# Patient Record
Sex: FEMALE | Race: WHITE | Employment: OTHER | URBAN - METROPOLITAN AREA
[De-identification: names, ages, dates, MRNs, and addresses within clinical notes are randomized per-mention and may not be internally consistent; named-entity substitution may affect disease eponyms.]

---

## 2017-01-03 RX ORDER — PREDNISONE 5 MG/1
TABLET ORAL
Qty: 70 TAB | Refills: 1 | Status: SHIPPED | OUTPATIENT
Start: 2017-01-03 | End: 2017-03-13 | Stop reason: SDUPTHER

## 2017-02-12 RX ORDER — GUANFACINE HYDROCHLORIDE 1 MG/1
TABLET ORAL
Qty: 45 TAB | Refills: 5 | Status: SHIPPED | OUTPATIENT
Start: 2017-02-12 | End: 2017-03-13 | Stop reason: SDUPTHER

## 2017-02-13 ENCOUNTER — HOSPITAL ENCOUNTER (OUTPATIENT)
Dept: LAB | Age: 82
Discharge: HOME | End: 2017-02-13
Payer: MEDICARE

## 2017-02-13 ENCOUNTER — OFFICE VISIT (OUTPATIENT)
Dept: INTERNAL MEDICINE CLINIC | Age: 82
End: 2017-02-13

## 2017-02-13 DIAGNOSIS — M15.9 PRIMARY OSTEOARTHRITIS INVOLVING MULTIPLE JOINTS: ICD-10-CM

## 2017-02-13 DIAGNOSIS — I10 ESSENTIAL HYPERTENSION: Primary | ICD-10-CM

## 2017-02-13 DIAGNOSIS — E78.00 PURE HYPERCHOLESTEROLEMIA: ICD-10-CM

## 2017-02-13 DIAGNOSIS — I10 ESSENTIAL HYPERTENSION: ICD-10-CM

## 2017-02-13 LAB
ANION GAP BLD CALC-SCNC: 10 MMOL/L (ref 3–18)
BUN SERPL-MCNC: 56 MG/DL (ref 7–18)
BUN/CREAT SERPL: 36 (ref 12–20)
CALCIUM SERPL-MCNC: 9 MG/DL (ref 8.5–10.1)
CHLORIDE SERPL-SCNC: 101 MMOL/L (ref 100–108)
CO2 SERPL-SCNC: 26 MMOL/L (ref 21–32)
CREAT SERPL-MCNC: 1.57 MG/DL (ref 0.6–1.3)
ERYTHROCYTE [SEDIMENTATION RATE] IN BLOOD: 33 MM/HR (ref 0–30)
GLUCOSE SERPL-MCNC: 108 MG/DL (ref 74–99)
POTASSIUM SERPL-SCNC: 4.5 MMOL/L (ref 3.5–5.5)
SODIUM SERPL-SCNC: 137 MMOL/L (ref 136–145)

## 2017-02-13 PROCEDURE — 86430 RHEUMATOID FACTOR TEST QUAL: CPT | Performed by: INTERNAL MEDICINE

## 2017-02-13 PROCEDURE — 80048 BASIC METABOLIC PNL TOTAL CA: CPT | Performed by: INTERNAL MEDICINE

## 2017-02-13 PROCEDURE — 36415 COLL VENOUS BLD VENIPUNCTURE: CPT | Performed by: INTERNAL MEDICINE

## 2017-02-13 PROCEDURE — 85652 RBC SED RATE AUTOMATED: CPT | Performed by: INTERNAL MEDICINE

## 2017-02-13 NOTE — PROGRESS NOTES
1. Have you been to the ER, urgent care clinic since your last visit? Hospitalized since your last visit? No    2. Have you seen or consulted any other health care providers outside of the [address] [place] [address] since your last visit? Include any pap smears or colon screening.  No

## 2017-02-13 NOTE — PATIENT INSTRUCTIONS
Health Maintenance Due   Topic Date Due    Glaucoma Screening   08/23/1997    Annual Well Visit  08/23/1997

## 2017-02-14 VITALS
OXYGEN SATURATION: 98 % | BODY MASS INDEX: 27.19 KG/M2 | DIASTOLIC BLOOD PRESSURE: 80 MMHG | HEART RATE: 56 BPM | HEIGHT: 61 IN | SYSTOLIC BLOOD PRESSURE: 138 MMHG | WEIGHT: 144 LBS

## 2017-02-14 LAB — RHEUMATOID FACT SER QL LA: NEGATIVE

## 2017-02-15 NOTE — PROGRESS NOTES
The patient presents to the office today with the chief complaint of arthralgias    HPI    The patient remains on Prednisone at 10 mg daily. She has pain and stiffness in multiple joints - this is much better on Prednisone. She feels that she is doing OK. The patient remains on medications for hypertension and hyperlipidemia. She is tolerating those medications well      Review of Systems   Respiratory: Negative for shortness of breath. Cardiovascular: Negative for chest pain and leg swelling. Genitourinary: Urgency: the pain is doing much better. Musculoskeletal: Positive for joint pain. Allergies   Allergen Reactions    Codeine Hives    Demerol [Meperidine] Nausea Only    Levaquin [Levofloxacin] Other (comments)     Swelling in feet and legs       Current Outpatient Prescriptions   Medication Sig Dispense Refill    peg 400-propylene glycol (SYSTANE, PROPYLENE GLYCOL,) 0.4-0.3 % drop Administer  to left eye as needed.  guanFACINE (TENEX) 1 mg tablet TAKE 1/2 TABLET BY MOUTH EVERY DAY. 45 Tab 5    predniSONE (DELTASONE) 5 mg tablet Take 3 tablets by mouth daily for 1 week. Then 2.5 tablets by mouth daily for 1 week. Then 2 tablets by mouth until further notice. 70 Tab 1    allopurinol (ZYLOPRIM) 100 mg tablet TAKE 1/2 TABLET BY MOUTH DAILY 30 Tab 0    GLUCOSAMINE HCL/CHONDR LEGER A NA (OSTEO BI-FLEX PO) Take  by mouth.  doxepin (SINEQUAN) 25 mg capsule Take 1 Cap by mouth nightly. 90 Cap 3    simvastatin (ZOCOR) 20 mg tablet Take 1 Tab by mouth nightly. 90 Tab 3    ranitidine (ZANTAC) 300 mg tablet Take 1 Tab by mouth daily. 90 Tab 3    levothyroxine (SYNTHROID) 100 mcg tablet Take 1 Tab by mouth nightly. 90 Tab 3    furosemide (LASIX) 20 mg tablet 1 tablet each AM 90 Tab 3    losartan (COZAAR) 100 mg tablet Take 1 Tab by mouth daily. 90 Tab 3    metoprolol tartrate (LOPRESSOR) 50 mg tablet Take 1 Tab by mouth two (2) times a day.  180 Tab 3    cyanocobalamin 1,000 mcg tablet Take 1,000 mcg by mouth daily.  colchicine 0.6 mg tablet Take 1 Tab by mouth daily. Indications: GOUT PREVENTION 30 Tab 2    aspirin delayed-release 81 mg tablet Take  by mouth daily.  folic acid 568 mcg tablet Take 800 mcg by mouth daily.  CALCIUM PHOSPHATE TRIB/VIT D3 (CITRACAL + D PO) Take  by mouth daily.  multivitamins-minerals-lutein (CENTRUM SILVER) tab tablet Take 1 Tab by mouth daily.  GLUC/LAVERNE-MSM#1/VIT C/COLLIN/BOR (GLUCOSAMINE-CHOND-MSM COMPLEX PO) Take 2 Tabs by mouth daily.  loratadine (CLARITIN) 10 mg tablet Take 10 mg by mouth daily.  tamoxifen (NOLVADEX) 20 mg tablet Take 20 mg by mouth daily.  clobetasol (OLUX) 0.05 % topical foam Apply  to affected area two (2) times daily as needed for Skin Irritation.  use thin film on affected area         Past Medical History   Diagnosis Date    Autoimmune disease ([address]     Breast cancer ([address] 2001, 1/ 2014     right, left    Cancer ([address]     Colon polyps     Elevated cholesterol     Enlarged lymph nodes      pericardium    GERD (gastroesophageal reflux disease)     Hypertension     Ill-defined condition 2007 to 2010     lung-ground glass followed by Dr. Felipe Rodriguez of breast, right 1976     benign    Mass of breast, right 1994     benign    Pure hyperglyceridemia     Thyroid disease        Past Surgical History   Procedure Laterality Date    Hx mastectomy Right 2001     with reconstruction    Hx mastectomy Left 1/14    Hx hemorrhoidectomy      Hx polypectomy       [id number] muliple removals    Hx cholecystectomy      Hx hysterectomy  1991     cystocele    Hx lymphadenectomy Right 7/2001     right axilla    Hx rectocele repair  2003    Hx hernia repair  2004     small abdominal repair and fistula    Hx cataract removal Bilateral 2010    Hx appendectomy         Social History     Social History    Marital status:      Spouse name: N/A    Number of children: N/A    Years of education: N/A     Occupational History    Not on file. Social History Main Topics    Smoking status: Former Smoker     Packs/day: 2.00     Years: 9.00     Quit date: 1/1/1963    Smokeless tobacco: Never Used    Alcohol use 0.5 oz/week     1 Standard drinks or equivalent per week      Comment: occasionally    Drug use: No    Sexual activity: Not Currently     Other Topics Concern    Not on file     Social History Narrative       Patient does not have an advanced directive on file    Visit Vitals    /80 (BP 1 Location: Left leg, BP Patient Position: Sitting)    Pulse (!) 56    Ht 5' 1\" (1.549 m)    Wt 144 lb (65.3 kg)    SpO2 98%    BMI 27.21 kg/m2       Physical Exam   Neck: Carotid bruit is not present. Cardiovascular: Normal rate and regular rhythm. Exam reveals no gallop. No murmur heard. Pulmonary/Chest: She has no wheezes. She has no rales. Abdominal: Soft. She exhibits no distension. There is no tenderness. Musculoskeletal: She exhibits no edema.    Osteoarthritis in both hands and knees       BMI:  [facility] [place] [facility] [place] on 02/13/2017   Component Date Value Ref Range Status    Sed rate, automated 02/13/2017 33* 0 - 30 mm/hr Final    Sodium 02/13/2017 137  136 - 145 mmol/L Final    Potassium 02/13/2017 4.5  3.5 - 5.5 mmol/L Final    Chloride 02/13/2017 101  100 - 108 mmol/L Final    CO2 02/13/2017 26  21 - 32 mmol/L Final    Anion gap 02/13/2017 10  3.0 - 18 mmol/L Final    Glucose 02/13/2017 108* 74 - 99 mg/dL Final    BUN 02/13/2017 56* 7.0 - 18 MG/DL Final    Creatinine 02/13/2017 1.57* 0.6 - 1.3 MG/DL Final    BUN/Creatinine ratio 02/13/2017 36* 12 - 20   Final    GFR est AA 02/13/2017 38* >60 ml/min/1.73m2 Final    GFR est non-AA 02/13/2017 31* >60 ml/min/1.73m2 Final    Comment: (NOTE)  Estimated GFR is calculated using the Modification of Diet in Renal   Disease (MDRD) Study equation, reported for both  Americans [facility]) and non- Americans (GFRNA), and normalized to 1.73m2   body surface area. The physician must decide which value applies to   the patient. The MDRD study equation should only be used in   individuals age 25 or older. It has not been validated for the   following: pregnant women, patients with serious comorbid conditions,   or on certain medications, or persons with extremes of body size,   muscle mass, or nutritional status.  Calcium 02/13/2017 9.0  8.5 - 10.1 MG/DL Final   Hospital Outpatient Visit on 12/16/2016   Component Date Value Ref Range Status    WBC 12/16/2016 7.7  4.6 - 13.2 K/uL Final    RBC 12/16/2016 3.51* 4.20 - 5.30 M/uL Final    HGB 12/16/2016 11.0* 12.0 - 16.0 g/dL Final    HCT 12/16/2016 33.7* 35.0 - 45.0 % Final    MCV 12/16/2016 96.0  74.0 - 97.0 FL Final    MCH 12/16/2016 31.3  24.0 - 34.0 PG Final    MCHC 12/16/2016 32.6  31.0 - 37.0 g/dL Final    RDW 12/16/2016 13.8  11.6 - 14.5 % Final    PLATELET 93/78/5569 245  135 - 420 K/uL Final    MPV 12/16/2016 9.2  9.2 - 11.8 FL Final    NEUTROPHILS 12/16/2016 63  40 - 73 % Final    LYMPHOCYTES 12/16/2016 21  21 - 52 % Final    MONOCYTES 12/16/2016 9  3 - 10 % Final    EOSINOPHILS 12/16/2016 6* 0 - 5 % Final    BASOPHILS 12/16/2016 1  0 - 2 % Final    ABS. NEUTROPHILS 12/16/2016 4.9  1.8 - 8.0 K/UL Final    ABS. LYMPHOCYTES 12/16/2016 1.6  0.9 - 3.6 K/UL Final    ABS. MONOCYTES 12/16/2016 0.7  0.05 - 1.2 K/UL Final    ABS. EOSINOPHILS 12/16/2016 0.4  0.0 - 0.4 K/UL Final    ABS.  BASOPHILS 12/16/2016 0.1* 0.0 - 0.06 K/UL Final    DF 12/16/2016 AUTOMATED    Final    Sodium 12/16/2016 135* 136 - 145 mmol/L Final    Potassium 12/16/2016 4.1  3.5 - 5.5 mmol/L Final    Chloride 12/16/2016 100  100 - 108 mmol/L Final    CO2 12/16/2016 27  21 - 32 mmol/L Final    Anion gap 12/16/2016 8  3.0 - 18 mmol/L Final    Glucose 12/16/2016 104* 74 - 99 mg/dL Final    BUN 12/16/2016 34* 7.0 - 18 MG/DL Final    Creatinine 12/16/2016 1.34* 0.6 - 1.3 MG/DL Final    BUN/Creatinine ratio 12/16/2016 25* 12 - 20   Final    GFR est AA 12/16/2016 46* >60 ml/min/1.73m2 Final    GFR est non-AA 12/16/2016 38* >60 ml/min/1.73m2 Final    Comment: (NOTE)  Estimated GFR is calculated using the Modification of Diet in Renal   Disease (MDRD) Study equation, reported for both  Americans   (GFRAA) and non- Americans (GFRNA), and normalized to 1.73m2   body surface area. The physician must decide which value applies to   the patient. The MDRD study equation should only be used in   individuals age 25 or older. It has not been validated for the   following: pregnant women, patients with serious comorbid conditions,   or on certain medications, or persons with extremes of body size,   muscle mass, or nutritional status.  Calcium 12/16/2016 9.2  8.5 - 10.1 MG/DL Final    Bilirubin, total 12/16/2016 0.4  0.2 - 1.0 MG/DL Final    ALT (SGPT) 12/16/2016 29  13 - 56 U/L Final    AST (SGOT) 12/16/2016 28  15 - 37 U/L Final    Alk.  phosphatase 12/16/2016 52  45 - 117 U/L Final    Protein, total 12/16/2016 7.2  6.4 - 8.2 g/dL Final    Albumin 12/16/2016 3.7  3.4 - 5.0 g/dL Final    Globulin 12/16/2016 3.5  2.0 - 4.0 g/dL Final    A-G Ratio 12/16/2016 1.1  0.8 - 1.7   Final    C-Reactive protein 12/16/2016 0.3  0 - 0.3 mg/dL Final    Sed rate, automated 12/16/2016 66* 0 - 30 mm/hr Final       .  Results for orders placed or performed during the hospital encounter of 02/13/17   SED RATE (ESR)   Result Value Ref Range    Sed rate, automated 33 (H) 0 - 30 mm/hr   METABOLIC PANEL, BASIC   Result Value Ref Range    Sodium 137 136 - 145 mmol/L    Potassium 4.5 3.5 - 5.5 mmol/L    Chloride 101 100 - 108 mmol/L    CO2 26 21 - 32 mmol/L    Anion gap 10 3.0 - 18 mmol/L    Glucose 108 (H) 74 - 99 mg/dL    BUN 56 (H) 7.0 - 18 MG/DL    Creatinine 1.57 (H) 0.6 - 1.3 MG/DL    BUN/Creatinine ratio 36 (H) 12 - 20      GFR est AA 38 (L) >60 ml/min/1.73m2    GFR est non-AA 31 (L) >60 ml/min/1.73m2    Calcium 9.0 8.5 - 10.1 MG/DL       Assessment / Plan      ICD-10-CM ICD-9-CM    1. Essential hypertension I10 401.9 peg 400-propylene glycol (SYSTANE, PROPYLENE GLYCOL,) 0.4-0.3 % drop      SED RATE (ESR)      METABOLIC PANEL, BASIC   2. Primary osteoarthritis involving multiple joints M15.0 715.09 peg 400-propylene glycol (SYSTANE, PROPYLENE GLYCOL,) 0.4-0.3 % drop      SED RATE (ESR)      METABOLIC PANEL, BASIC      RHEUMATOID FACTOR, QL   3. Pure hypercholesterolemia E78.00 272.0      Change Prednisone to 10 mg alternate with 5 mg  she was advised to continue her maintenance medications  Labs ordered    Follow-up Disposition:  Return in about 4 months (around 6/13/2017). I asked Rachael Diggs if she has any questions and I answered the questions. Chadwick Diggs states that she understands the treatment plan and agrees with the treatment plan

## 2017-02-17 ENCOUNTER — TELEPHONE (OUTPATIENT)
Dept: INTERNAL MEDICINE CLINIC | Age: 82
End: 2017-02-17

## 2017-03-13 ENCOUNTER — TELEPHONE (OUTPATIENT)
Dept: INTERNAL MEDICINE CLINIC | Age: 82
End: 2017-03-13

## 2017-03-13 RX ORDER — GUANFACINE HYDROCHLORIDE 1 MG/1
TABLET ORAL
Qty: 45 TAB | Refills: 5 | Status: SHIPPED | OUTPATIENT
Start: 2017-03-13 | End: 2017-04-11 | Stop reason: SDUPTHER

## 2017-03-13 RX ORDER — PREDNISONE 5 MG/1
TABLET ORAL
Qty: 70 TAB | Refills: 1 | Status: SHIPPED | OUTPATIENT
Start: 2017-03-13 | End: 2017-05-30 | Stop reason: SDUPTHER

## 2017-03-13 NOTE — TELEPHONE ENCOUNTER
Requested Prescriptions     Pending Prescriptions Disp Refills    predniSONE (DELTASONE) 5 mg tablet 70 Tab 1     Sig: Take 3 tablets by mouth daily for 1 week. Then 2.5 tablets by mouth daily for 1 week. Then 2 tablets by mouth until further notice.     guanFACINE (TENEX) 1 mg tablet 45 Tab 5

## 2017-03-13 NOTE — TELEPHONE ENCOUNTER
Patient has called for lab results in February and never received a call back.   Please call her with her lab results at [phone]

## 2017-03-15 ENCOUNTER — OFFICE VISIT (OUTPATIENT)
Dept: INTERNAL MEDICINE CLINIC | Age: 82
End: 2017-03-15

## 2017-03-15 VITALS
BODY MASS INDEX: 27.19 KG/M2 | HEART RATE: 57 BPM | RESPIRATION RATE: 16 BRPM | HEIGHT: 61 IN | TEMPERATURE: 96.5 F | OXYGEN SATURATION: 97 % | SYSTOLIC BLOOD PRESSURE: 140 MMHG | WEIGHT: 144 LBS | DIASTOLIC BLOOD PRESSURE: 72 MMHG

## 2017-03-15 DIAGNOSIS — E78.00 PURE HYPERCHOLESTEROLEMIA: ICD-10-CM

## 2017-03-15 DIAGNOSIS — N18.2 CHRONIC RENAL INSUFFICIENCY, STAGE 2 (MILD): ICD-10-CM

## 2017-03-15 DIAGNOSIS — R30.0 DYSURIA: Primary | ICD-10-CM

## 2017-03-15 DIAGNOSIS — I10 ESSENTIAL HYPERTENSION: ICD-10-CM

## 2017-03-15 LAB
BILIRUB UR QL STRIP: NEGATIVE
GLUCOSE UR-MCNC: NEGATIVE MG/DL
KETONES P FAST UR STRIP-MCNC: NEGATIVE MG/DL
PH UR STRIP: 5 [PH] (ref 4.6–8)
PROT UR QL STRIP: NEGATIVE MG/DL
SP GR UR STRIP: 1.01 (ref 1–1.03)
UA UROBILINOGEN AMB POC: NORMAL (ref 0.2–1)
URINALYSIS CLARITY POC: CLEAR
URINALYSIS COLOR POC: YELLOW
URINE BLOOD POC: NEGATIVE
URINE LEUKOCYTES POC: NEGATIVE
URINE NITRITES POC: NEGATIVE

## 2017-03-15 RX ORDER — VERAPAMIL HYDROCHLORIDE 180 MG/1
TABLET, EXTENDED RELEASE ORAL
Qty: 30 TAB | Refills: 2 | Status: SHIPPED | OUTPATIENT
Start: 2017-03-15 | End: 2017-03-15 | Stop reason: SDUPTHER

## 2017-03-15 NOTE — PROGRESS NOTES
1. Have you been to the ER, urgent care clinic since your last visit? Hospitalized since your last visit? No    2. Have you seen or consulted any other health care providers outside of the [address] [place] [address] since your last visit? Include any pap smears or colon screening.  No

## 2017-03-16 RX ORDER — VERAPAMIL HYDROCHLORIDE 180 MG/1
TABLET, EXTENDED RELEASE ORAL
Qty: 90 TAB | Refills: 2 | Status: SHIPPED | OUTPATIENT
Start: 2017-03-16 | End: 2017-06-12

## 2017-03-17 NOTE — PROGRESS NOTES
The patient presents to the office today with the chief complaint of hypertension    HPI    The patient remains on medications for hypertension. she is tolerating the medications well. The patient has renal insufficiency which is stage 2 but slowly worsening   The patient remains on medications for hyperlipidemia. The patient is tolerating the medications well. The patient has complaints of mild dysuria. Review of Systems   Respiratory: Negative for shortness of breath. Cardiovascular: Negative for chest pain and leg swelling. Genitourinary: Positive for dysuria (As per HPI). Allergies   Allergen Reactions    Codeine Hives    Demerol [Meperidine] Nausea Only    Levaquin [Levofloxacin] Other (comments)     Swelling in feet and legs       Current Outpatient Prescriptions   Medication Sig Dispense Refill    verapamil ER (CALAN-SR) 180 mg CR tablet TAKE 1 TABLET BY MOUTH DAILY. STOP LOSARTAN 90 Tab 2    predniSONE (DELTASONE) 5 mg tablet Take 3 tablets by mouth daily for 1 week. Then 2.5 tablets by mouth daily for 1 week. Then 2 tablets by mouth until further notice. 70 Tab 1    guanFACINE (TENEX) 1 mg tablet TAKE 1/2 TABLET BY MOUTH EVERY DAY. 45 Tab 5    peg 400-propylene glycol (SYSTANE, PROPYLENE GLYCOL,) 0.4-0.3 % drop Administer  to left eye as needed.  allopurinol (ZYLOPRIM) 100 mg tablet TAKE 1/2 TABLET BY MOUTH DAILY 30 Tab 0    GLUCOSAMINE HCL/CHONDR LEGER A NA (OSTEO BI-FLEX PO) Take  by mouth.  doxepin (SINEQUAN) 25 mg capsule Take 1 Cap by mouth nightly. 90 Cap 3    simvastatin (ZOCOR) 20 mg tablet Take 1 Tab by mouth nightly. 90 Tab 3    ranitidine (ZANTAC) 300 mg tablet Take 1 Tab by mouth daily. 90 Tab 3    levothyroxine (SYNTHROID) 100 mcg tablet Take 1 Tab by mouth nightly. 90 Tab 3    furosemide (LASIX) 20 mg tablet 1 tablet each AM 90 Tab 3    losartan (COZAAR) 100 mg tablet Take 1 Tab by mouth daily.  90 Tab 3    metoprolol tartrate (LOPRESSOR) 50 mg tablet Take 1 Tab by mouth two (2) times a day. 180 Tab 3    cyanocobalamin 1,000 mcg tablet Take 1,000 mcg by mouth daily.  colchicine 0.6 mg tablet Take 1 Tab by mouth daily. Indications: GOUT PREVENTION 30 Tab 2    aspirin delayed-release 81 mg tablet Take  by mouth daily.  folic acid 564 mcg tablet Take 800 mcg by mouth daily.  CALCIUM PHOSPHATE TRIB/VIT D3 (CITRACAL + D PO) Take  by mouth daily.  multivitamins-minerals-lutein (CENTRUM SILVER) tab tablet Take 1 Tab by mouth daily.  GLUC/LAVERNE-MSM#1/VIT C/COLLIN/BOR (GLUCOSAMINE-CHOND-MSM COMPLEX PO) Take 2 Tabs by mouth daily.  loratadine (CLARITIN) 10 mg tablet Take 10 mg by mouth daily.  tamoxifen (NOLVADEX) 20 mg tablet Take 20 mg by mouth daily.  clobetasol (OLUX) 0.05 % topical foam Apply  to affected area two (2) times daily as needed for Skin Irritation.  use thin film on affected area         Past Medical History:   Diagnosis Date    Autoimmune disease ([address]     Breast cancer ([address] 2001, 1/ 2014    right, left    Cancer ([address]     Colon polyps     Elevated cholesterol     Enlarged lymph nodes     pericardium    GERD (gastroesophageal reflux disease)     Hypertension     Ill-defined condition 2007 to 2010    lung-ground glass followed by Dr. Jonathan Franco of breast, right 1976    benign    Mass of breast, right 1994    benign    Pure hyperglyceridemia     Thyroid disease        Past Surgical History:   Procedure Laterality Date    HX APPENDECTOMY      HX CATARACT REMOVAL Bilateral 2010    HX CHOLECYSTECTOMY      HX HEMORRHOIDECTOMY      HX HERNIA REPAIR  2004    small abdominal repair and fistula    HX HYSTERECTOMY  1991    cystocele    HX LYMPHADENECTOMY Right 7/2001    right axilla    HX MASTECTOMY Right 2001    with reconstruction    HX MASTECTOMY Left 1/14    HX POLYPECTOMY      [id number] muliple removals    HX RECTOCELE REPAIR  2003       Social History Social History    Marital status:      Spouse name: N/A    Number of children: N/A    Years of education: N/A     Occupational History    Not on file.      Social History Main Topics    Smoking status: Former Smoker     Packs/day: 2.00     Years: 9.00     Quit date: 1/1/1963    Smokeless tobacco: Never Used    Alcohol use 0.5 oz/week     1 Standard drinks or equivalent per week      Comment: occasionally    Drug use: No    Sexual activity: Not Currently     Other Topics Concern    Not on file     Social History Narrative       Patient does have an advanced directive on file    Visit Vitals    /72 (BP 1 Location: Left arm, BP Patient Position: Sitting)    Pulse (!) 57    Temp 96.5 °F (35.8 °C) (Tympanic)    Resp 16    Ht 5' 1\" (1.549 m)    Wt 144 lb (65.3 kg)    SpO2 97%    BMI 27.21 kg/m2       Physical Exam    BMI:  ok    Office Visit on 03/15/2017   Component Date Value Ref Range Status    Color (UA POC) 03/15/2017 Yellow   Final    Clarity (UA POC) 03/15/2017 Clear   Final    Glucose (UA POC) 03/15/2017 Negative  Negative Final    Bilirubin (UA POC) 03/15/2017 Negative  Negative Final    Ketones (UA POC) 03/15/2017 Negative  Negative Final    Specific gravity (UA POC) 03/15/2017 1.010  1.001 - 1.035 Final    Blood (UA POC) 03/15/2017 Negative  Negative Final    pH (UA POC) 03/15/2017 5.0  4.6 - 8.0 Final    Protein (UA POC) 03/15/2017 Negative  Negative mg/dL Final    Urobilinogen (UA POC) 03/15/2017 0.2 mg/dL  0.2 - 1 Final    Nitrites (UA POC) 03/15/2017 Negative  Negative Final    Leukocyte esterase (UA POC) 03/15/2017 Negative  Negative Final   Hospital Outpatient Visit on 02/13/2017   Component Date Value Ref Range Status    Sed rate, automated 02/13/2017 33* 0 - 30 mm/hr Final    Sodium 02/13/2017 137  136 - 145 mmol/L Final    Potassium 02/13/2017 4.5  3.5 - 5.5 mmol/L Final    Chloride 02/13/2017 101  100 - 108 mmol/L Final    CO2 02/13/2017 26  21 - 32 mmol/L Final    Anion gap 02/13/2017 10  3.0 - 18 mmol/L Final    Glucose 02/13/2017 108* 74 - 99 mg/dL Final    BUN 02/13/2017 56* 7.0 - 18 MG/DL Final    Creatinine 02/13/2017 1.57* 0.6 - 1.3 MG/DL Final    BUN/Creatinine ratio 02/13/2017 36* 12 - 20   Final    GFR est AA 02/13/2017 38* >60 ml/min/1.73m2 Final    GFR est non-AA 02/13/2017 31* >60 ml/min/1.73m2 Final    Comment: (NOTE)  Estimated GFR is calculated using the Modification of Diet in Renal   Disease (MDRD) Study equation, reported for both  Americans   (GFRAA) and non- Americans (GFRNA), and normalized to 1.73m2   body surface area. The physician must decide which value applies to   the patient. The MDRD study equation should only be used in   individuals age 25 or older. It has not been validated for the   following: pregnant women, patients with serious comorbid conditions,   or on certain medications, or persons with extremes of body size,   muscle mass, or nutritional status.  Calcium 02/13/2017 9.0  8.5 - 10.1 MG/DL Final    RA Latex, Ql. 02/13/2017 NEGATIVE   NEG   Final   Hospital Outpatient Visit on 12/16/2016   Component Date Value Ref Range Status    WBC 12/16/2016 7.7  4.6 - 13.2 K/uL Final    RBC 12/16/2016 3.51* 4.20 - 5.30 M/uL Final    HGB 12/16/2016 11.0* 12.0 - 16.0 g/dL Final    HCT 12/16/2016 33.7* 35.0 - 45.0 % Final    MCV 12/16/2016 96.0  74.0 - 97.0 FL Final    MCH 12/16/2016 31.3  24.0 - 34.0 PG Final    MCHC 12/16/2016 32.6  31.0 - 37.0 g/dL Final    RDW 12/16/2016 13.8  11.6 - 14.5 % Final    PLATELET 62/84/4959 869  135 - 420 K/uL Final    MPV 12/16/2016 9.2  9.2 - 11.8 FL Final    NEUTROPHILS 12/16/2016 63  40 - 73 % Final    LYMPHOCYTES 12/16/2016 21  21 - 52 % Final    MONOCYTES 12/16/2016 9  3 - 10 % Final    EOSINOPHILS 12/16/2016 6* 0 - 5 % Final    BASOPHILS 12/16/2016 1  0 - 2 % Final    ABS. NEUTROPHILS 12/16/2016 4.9  1.8 - 8.0 K/UL Final    ABS.  LYMPHOCYTES 12/16/2016 1.6  0.9 - 3.6 K/UL Final    ABS. MONOCYTES 12/16/2016 0.7  0.05 - 1.2 K/UL Final    ABS. EOSINOPHILS 12/16/2016 0.4  0.0 - 0.4 K/UL Final    ABS. BASOPHILS 12/16/2016 0.1* 0.0 - 0.06 K/UL Final    DF 12/16/2016 AUTOMATED    Final    Sodium 12/16/2016 135* 136 - 145 mmol/L Final    Potassium 12/16/2016 4.1  3.5 - 5.5 mmol/L Final    Chloride 12/16/2016 100  100 - 108 mmol/L Final    CO2 12/16/2016 27  21 - 32 mmol/L Final    Anion gap 12/16/2016 8  3.0 - 18 mmol/L Final    Glucose 12/16/2016 104* 74 - 99 mg/dL Final    BUN 12/16/2016 34* 7.0 - 18 MG/DL Final    Creatinine 12/16/2016 1.34* 0.6 - 1.3 MG/DL Final    BUN/Creatinine ratio 12/16/2016 25* 12 - 20   Final    GFR est AA 12/16/2016 46* >60 ml/min/1.73m2 Final    GFR est non-AA 12/16/2016 38* >60 ml/min/1.73m2 Final    Comment: (NOTE)  Estimated GFR is calculated using the Modification of Diet in Renal   Disease (MDRD) Study equation, reported for both  Americans   (GFRAA) and non- Americans (GFRNA), and normalized to 1.73m2   body surface area. The physician must decide which value applies to   the patient. The MDRD study equation should only be used in   individuals age 25 or older. It has not been validated for the   following: pregnant women, patients with serious comorbid conditions,   or on certain medications, or persons with extremes of body size,   muscle mass, or nutritional status.  Calcium 12/16/2016 9.2  8.5 - 10.1 MG/DL Final    Bilirubin, total 12/16/2016 0.4  0.2 - 1.0 MG/DL Final    ALT (SGPT) 12/16/2016 29  13 - 56 U/L Final    AST (SGOT) 12/16/2016 28  15 - 37 U/L Final    Alk.  phosphatase 12/16/2016 52  45 - 117 U/L Final    Protein, total 12/16/2016 7.2  6.4 - 8.2 g/dL Final    Albumin 12/16/2016 3.7  3.4 - 5.0 g/dL Final    Globulin 12/16/2016 3.5  2.0 - 4.0 g/dL Final    A-G Ratio 12/16/2016 1.1  0.8 - 1.7   Final    C-Reactive protein 12/16/2016 0.3  0 - 0.3 mg/dL Final    Sed rate, automated 12/16/2016 66* 0 - 30 mm/hr Final       .  Results for orders placed or performed in visit on 03/15/17   AMB POC URINALYSIS DIP STICK AUTO W/O MICRO   Result Value Ref Range    Color (UA POC) Yellow     Clarity (UA POC) Clear     Glucose (UA POC) Negative Negative    Bilirubin (UA POC) Negative Negative    Ketones (UA POC) Negative Negative    Specific gravity (UA POC) 1.010 1.001 - 1.035    Blood (UA POC) Negative Negative    pH (UA POC) 5.0 4.6 - 8.0    Protein (UA POC) Negative Negative mg/dL    Urobilinogen (UA POC) 0.2 mg/dL 0.2 - 1    Nitrites (UA POC) Negative Negative    Leukocyte esterase (UA POC) Negative Negative       Assessment / Plan      ICD-10-CM ICD-9-CM    1. Dysuria R30.0 788.1 AMB POC URINALYSIS DIP STICK AUTO W/O MICRO   2. Essential hypertension I10 401.9    3. Pure hypercholesterolemia E78.00 272.0    4. Chronic renal insufficiency, stage 2 (mild) N18.2 585.2      POC urine  Change Losartan to Verapamil  she was advised to continue her other maintenance medications  Recheck labs in 6 weeks    Follow-up Disposition:  Return in about 4 months (around 7/15/2017). I asked Adore Espinoza. Kate Sunshine if she has any questions and I answered the questions. Rhonda Sunshine states that she understands the treatment plan and agrees with the treatment plan

## 2017-04-12 ENCOUNTER — LAB ONLY (OUTPATIENT)
Dept: INTERNAL MEDICINE CLINIC | Age: 82
End: 2017-04-12

## 2017-04-12 ENCOUNTER — HOSPITAL ENCOUNTER (OUTPATIENT)
Dept: LAB | Age: 82
Discharge: HOME | End: 2017-04-12
Payer: MEDICARE

## 2017-04-12 DIAGNOSIS — I10 ESSENTIAL HYPERTENSION: ICD-10-CM

## 2017-04-12 DIAGNOSIS — E78.00 PURE HYPERCHOLESTEROLEMIA: ICD-10-CM

## 2017-04-12 DIAGNOSIS — I10 ESSENTIAL HYPERTENSION: Primary | ICD-10-CM

## 2017-04-12 LAB
ALBUMIN SERPL BCP-MCNC: 3.6 G/DL (ref 3.4–5)
ALBUMIN/GLOB SERPL: 1.1 {RATIO} (ref 0.8–1.7)
ALP SERPL-CCNC: 46 U/L (ref 45–117)
ALT SERPL-CCNC: 34 U/L (ref 13–56)
ANION GAP BLD CALC-SCNC: 10 MMOL/L (ref 3–18)
AST SERPL W P-5'-P-CCNC: 21 U/L (ref 15–37)
BASOPHILS # BLD AUTO: 0.1 K/UL (ref 0–0.06)
BASOPHILS # BLD: 1 % (ref 0–2)
BILIRUB SERPL-MCNC: 0.4 MG/DL (ref 0.2–1)
BUN SERPL-MCNC: 39 MG/DL (ref 7–18)
BUN/CREAT SERPL: 25 (ref 12–20)
CALCIUM SERPL-MCNC: 9.4 MG/DL (ref 8.5–10.1)
CHLORIDE SERPL-SCNC: 101 MMOL/L (ref 100–108)
CHOLEST SERPL-MCNC: 159 MG/DL
CO2 SERPL-SCNC: 29 MMOL/L (ref 21–32)
CREAT SERPL-MCNC: 1.54 MG/DL (ref 0.6–1.3)
DIFFERENTIAL METHOD BLD: ABNORMAL
EOSINOPHIL # BLD: 0.2 K/UL (ref 0–0.4)
EOSINOPHIL NFR BLD: 3 % (ref 0–5)
ERYTHROCYTE [DISTWIDTH] IN BLOOD BY AUTOMATED COUNT: 14.7 % (ref 11.6–14.5)
ERYTHROCYTE [SEDIMENTATION RATE] IN BLOOD: 33 MM/HR (ref 0–30)
GLOBULIN SER CALC-MCNC: 3.2 G/DL (ref 2–4)
GLUCOSE SERPL-MCNC: 83 MG/DL (ref 74–99)
HCT VFR BLD AUTO: 36.9 % (ref 35–45)
HDLC SERPL-MCNC: 83 MG/DL (ref 40–60)
HDLC SERPL: 1.9 {RATIO} (ref 0–5)
HGB BLD-MCNC: 11.3 G/DL (ref 12–16)
LDLC SERPL CALC-MCNC: 52.2 MG/DL (ref 0–100)
LIPID PROFILE,FLP: ABNORMAL
LYMPHOCYTES # BLD AUTO: 27 % (ref 21–52)
LYMPHOCYTES # BLD: 2.1 K/UL (ref 0.9–3.6)
MCH RBC QN AUTO: 31.3 PG (ref 24–34)
MCHC RBC AUTO-ENTMCNC: 30.6 G/DL (ref 31–37)
MCV RBC AUTO: 102.2 FL (ref 74–97)
MONOCYTES # BLD: 0.8 K/UL (ref 0.05–1.2)
MONOCYTES NFR BLD AUTO: 10 % (ref 3–10)
NEUTS SEG # BLD: 4.5 K/UL (ref 1.8–8)
NEUTS SEG NFR BLD AUTO: 59 % (ref 40–73)
PLATELET # BLD AUTO: 283 K/UL (ref 135–420)
PMV BLD AUTO: 9.2 FL (ref 9.2–11.8)
POTASSIUM SERPL-SCNC: 4.2 MMOL/L (ref 3.5–5.5)
PROT SERPL-MCNC: 6.8 G/DL (ref 6.4–8.2)
RBC # BLD AUTO: 3.61 M/UL (ref 4.2–5.3)
SODIUM SERPL-SCNC: 140 MMOL/L (ref 136–145)
TRIGL SERPL-MCNC: 119 MG/DL (ref ?–150)
VLDLC SERPL CALC-MCNC: 23.8 MG/DL
WBC # BLD AUTO: 7.7 K/UL (ref 4.6–13.2)

## 2017-04-12 PROCEDURE — 80053 COMPREHEN METABOLIC PANEL: CPT | Performed by: INTERNAL MEDICINE

## 2017-04-12 PROCEDURE — 85025 COMPLETE CBC W/AUTO DIFF WBC: CPT | Performed by: INTERNAL MEDICINE

## 2017-04-12 PROCEDURE — 85652 RBC SED RATE AUTOMATED: CPT | Performed by: INTERNAL MEDICINE

## 2017-04-12 PROCEDURE — 80061 LIPID PANEL: CPT | Performed by: INTERNAL MEDICINE

## 2017-04-12 RX ORDER — GUANFACINE HYDROCHLORIDE 1 MG/1
TABLET ORAL
Qty: 30 TAB | Refills: 0 | Status: SHIPPED | OUTPATIENT
Start: 2017-04-12 | End: 2017-06-16 | Stop reason: SDUPTHER

## 2017-04-20 ENCOUNTER — OFFICE VISIT (OUTPATIENT)
Dept: INTERNAL MEDICINE CLINIC | Age: 82
End: 2017-04-20

## 2017-04-20 VITALS
TEMPERATURE: 98.1 F | DIASTOLIC BLOOD PRESSURE: 80 MMHG | RESPIRATION RATE: 16 BRPM | SYSTOLIC BLOOD PRESSURE: 158 MMHG | OXYGEN SATURATION: 98 % | WEIGHT: 144 LBS | HEIGHT: 61 IN | HEART RATE: 62 BPM | BODY MASS INDEX: 27.19 KG/M2

## 2017-04-20 DIAGNOSIS — I10 ESSENTIAL HYPERTENSION: Primary | ICD-10-CM

## 2017-04-20 DIAGNOSIS — E78.00 PURE HYPERCHOLESTEROLEMIA: ICD-10-CM

## 2017-04-20 RX ORDER — HYDRALAZINE HYDROCHLORIDE 25 MG/1
TABLET, FILM COATED ORAL
Qty: 180 TAB | Refills: 2 | Status: SHIPPED | OUTPATIENT
Start: 2017-04-20 | End: 2017-07-06 | Stop reason: SDUPTHER

## 2017-04-20 RX ORDER — HYDRALAZINE HYDROCHLORIDE 25 MG/1
TABLET, FILM COATED ORAL
Qty: 60 TAB | Refills: 2 | Status: SHIPPED | OUTPATIENT
Start: 2017-04-20 | End: 2017-04-20 | Stop reason: SDUPTHER

## 2017-04-20 NOTE — PROGRESS NOTES
1. Have you been to the ER, urgent care clinic since your last visit? Hospitalized since your last visit? No    2. Have you seen or consulted any other health care providers outside of the [address] [place] [address] since your last visit? Include any pap smears or colon screening.  No

## 2017-04-20 NOTE — MR AVS SNAPSHOT
Visit Information Date & Time Provider Department Dept. Phone Encounter #  
 4/20/2017 10:30 AM Finesse Holden MD [facility] [place] [phone] [id number] Your Appointments 9/6/2017 12:30 PM  
Follow Up with Finesse Holden MD  
[address]) Appt Note: 5 mo f/u  
 [address]  
  
   
 [address], [address] [place] [address] Upcoming Health Maintenance Date Due  
 MEDICARE YEARLY EXAM 8/23/1997 GLAUCOMA SCREENING Q2Y 3/1/2018 DTaP/Tdap/Td series (2 - Td) 10/20/2026 Allergies as of 4/20/2017  Review Complete On: 4/20/2017 By: Prasanna Loya LPN Severity Noted Reaction Type Reactions Codeine  08/04/2014    Hives Demerol [Meperidine]  08/04/2014    Nausea Only Levaquin [Levofloxacin]  08/04/2014    Other (comments) Swelling in feet and legs Current Immunizations  Never Reviewed Name Date Influenza High Dose Vaccine PF 10/13/2016  8:53 AM  
 Influenza Vaccine 10/6/2015 Pneumococcal Conjugate (PCV-13) 10/6/2015 Pneumococcal Polysaccharide (PPSV-23) 4/11/2003, 9/9/1993 Td 7/30/2008, 9/9/1993 Tdap 10/20/2016  8:40 AM  
 Zoster Vaccine, Live 1/14/2009 Not reviewed this visit Vitals BP Pulse Temp Resp Height(growth percentile) 158/80 (BP 1 Location: Left arm, BP Patient Position: Sitting) 62 98.1 °F (36.7 °C) (Tympanic) 16 5' 1\" (1.549 m) Weight(growth percentile) SpO2 BMI OB Status Smoking Status 144 lb (65.3 kg) 98% 27.21 kg/m2 Hysterectomy Former Smoker BMI and BSA Data Body Mass Index Body Surface Area  
 27.21 kg/m 2 1.68 m 2 Preferred Pharmacy Pharmacy Name Phone [facility] [address] [phone] Your Updated Medication List  
  
   
 This list is accurate as of: 4/20/17  1:11 PM.  Always use your most recent med list.  
  
  
  
  
 allopurinol 100 mg tablet Commonly known as:  ZYLOPRIM  
TAKE 1/2 TABLET BY MOUTH DAILY  
  
 aspirin delayed-release 81 mg tablet Take  by mouth daily. CENTRUM SILVER Tab tablet Generic drug:  multivitamins-minerals-lutein Take 1 Tab by mouth daily. CITRACAL + D PO Take  by mouth daily. clobetasol 0.05 % topical foam  
Commonly known as:  OLUX Apply  to affected area two (2) times daily as needed for Skin Irritation. use thin film on affected area  
  
 colchicine 0.6 mg tablet Take 1 Tab by mouth daily. Indications: GOUT PREVENTION  
  
 cyanocobalamin 1,000 mcg tablet Take 1,000 mcg by mouth daily. doxepin 25 mg capsule Commonly known as:  SINEquan Take 1 Cap by mouth nightly. folic acid 702 mcg tablet Take 800 mcg by mouth daily. furosemide 20 mg tablet Commonly known as:  LASIX  
1 tablet each AM  
  
 guanFACINE IR 1 mg IR tablet Commonly known as:  TENEX  
TAKE 1/2 TABLET BY MOUTH EVERY DAY. hydrALAZINE 25 mg tablet Commonly known as:  APRESOLINE  
1 tablet twice per day (stop Verapamil) levothyroxine 100 mcg tablet Commonly known as:  SYNTHROID Take 1 Tab by mouth nightly. loratadine 10 mg tablet Commonly known as:  Mac Nelly Take 10 mg by mouth daily. losartan 100 mg tablet Commonly known as:  COZAAR Take 1 Tab by mouth daily. metoprolol tartrate 50 mg tablet Commonly known as:  LOPRESSOR Take 1 Tab by mouth two (2) times a day. OSTEO BI-FLEX PO Take  by mouth.  
  
 predniSONE 5 mg tablet Commonly known as:  Job Bryon Take 3 tablets by mouth daily for 1 week. Then 2.5 tablets by mouth daily for 1 week. Then 2 tablets by mouth until further notice. raNITIdine 300 mg tablet Commonly known as:  ZANTAC Take 1 Tab by mouth daily. simvastatin 20 mg tablet Commonly known as:  ZOCOR Take 1 Tab by mouth nightly. SYSTANE (PROPYLENE GLYCOL) 0.4-0.3 % Drop Generic drug:  peg 400-propylene glycol Administer  to left eye as needed. tamoxifen 20 mg tablet Commonly known as:  NOLVADEX Take 20 mg by mouth daily. verapamil  mg CR tablet Commonly known as:  CALAN-SR  
TAKE 1 TABLET BY MOUTH DAILY. STOP LOSARTAN Prescriptions Sent to Pharmacy Refills  
 hydrALAZINE (APRESOLINE) 25 mg tablet 2 Si tablet twice per day (stop Verapamil) Class: Normal  
 Pharmacy: [facility] [address] [place], [place] [address] [address] [place] [address] #: [phone] Patient Instructions Health Maintenance Due Topic  MEDICARE YEARLY EXAM   
 
 
 
  
Introducing [employer]! [facility] [employer] [facility] introduces BrightFunnel patient portal. Now you can access parts of your medical record, email your doctor's office, and request medication refills online. 1. In your internet browser, go to [email or web address] 2. Click on the First Time User? Click Here link in the Sign In box. You will see the New Member Sign Up page. 3. Enter your BrightFunnel Access Code exactly as it appears below. You will not need to use this code after youve completed the sign-up process. If you do not sign up before the expiration date, you must request a new code. · BrightFunnel Access Code: [email or web address] Expires: 2017 10:42 AM 
 
4. Enter the last four digits of your Social Security Number (xxxx) and Date of Birth (mm/dd/yyyy) as indicated and click Submit. You will be taken to the next sign-up page. 5. Create a [employer] ID. This will be your BrightFunnel login ID and cannot be changed, so think of one that is secure and easy to remember. 6. Create a BrightFunnel password. You can change your password at any time. 7. Enter your Password Reset Question and Answer.  This can be used at a later time if you forget your password. 8. Enter your e-mail address. You will receive e-mail notification when new information is available in [address] 9. Click Sign Up. You can now view and download portions of your medical record. 10. Click the Download Summary menu link to download a portable copy of your medical information. If you have questions, please visit the Frequently Asked Questions section of the [employer] website. Remember, [employer] is NOT to be used for urgent needs. For medical emergencies, dial 911. Now available from your iPhone and Android! Please provide this summary of care documentation to your next provider. Your primary care clinician is listed as Devora Pearce. If you have any questions after today's visit, please call [phone].

## 2017-04-21 NOTE — PROGRESS NOTES
The patient presents to the office today with the chief complaint of hypertension    HPI    The patient remains on medications for hypertension. she is tolerating the medications well. The patient also remains on medications for hyperlipidemia. She is tolerating the medications well. The patient has no complaints. Review of Systems   Respiratory: Negative for shortness of breath. Cardiovascular: Negative for chest pain and leg swelling. Allergies   Allergen Reactions    Codeine Hives    Demerol [Meperidine] Nausea Only    Levaquin [Levofloxacin] Other (comments)     Swelling in feet and legs       Current Outpatient Prescriptions   Medication Sig Dispense Refill    hydrALAZINE (APRESOLINE) 25 mg tablet TAKE 1 TABLET BY MOUTH TWICE DAILY. STOP VERAPAMIL 180 Tab 2    guanFACINE (TENEX) 1 mg tablet TAKE 1/2 TABLET BY MOUTH EVERY DAY. 30 Tab 0    verapamil ER (CALAN-SR) 180 mg CR tablet TAKE 1 TABLET BY MOUTH DAILY. STOP LOSARTAN 90 Tab 2    predniSONE (DELTASONE) 5 mg tablet Take 3 tablets by mouth daily for 1 week. Then 2.5 tablets by mouth daily for 1 week. Then 2 tablets by mouth until further notice. 70 Tab 1    peg 400-propylene glycol (SYSTANE, PROPYLENE GLYCOL,) 0.4-0.3 % drop Administer  to left eye as needed.  allopurinol (ZYLOPRIM) 100 mg tablet TAKE 1/2 TABLET BY MOUTH DAILY 30 Tab 0    GLUCOSAMINE HCL/CHONDR LEGER A NA (OSTEO BI-FLEX PO) Take  by mouth.  doxepin (SINEQUAN) 25 mg capsule Take 1 Cap by mouth nightly. 90 Cap 3    simvastatin (ZOCOR) 20 mg tablet Take 1 Tab by mouth nightly. 90 Tab 3    ranitidine (ZANTAC) 300 mg tablet Take 1 Tab by mouth daily. 90 Tab 3    levothyroxine (SYNTHROID) 100 mcg tablet Take 1 Tab by mouth nightly. 90 Tab 3    furosemide (LASIX) 20 mg tablet 1 tablet each AM 90 Tab 3    metoprolol tartrate (LOPRESSOR) 50 mg tablet Take 1 Tab by mouth two (2) times a day.  180 Tab 3    cyanocobalamin 1,000 mcg tablet Take 1,000 mcg by mouth daily.      aspirin delayed-release 81 mg tablet Take  by mouth daily.  folic acid 074 mcg tablet Take 800 mcg by mouth daily.  CALCIUM PHOSPHATE TRIB/VIT D3 (CITRACAL + D PO) Take  by mouth daily.  multivitamins-minerals-lutein (CENTRUM SILVER) tab tablet Take 1 Tab by mouth daily.  loratadine (CLARITIN) 10 mg tablet Take 10 mg by mouth daily.  tamoxifen (NOLVADEX) 20 mg tablet Take 20 mg by mouth daily.  clobetasol (OLUX) 0.05 % topical foam Apply  to affected area two (2) times daily as needed for Skin Irritation. use thin film on affected area      losartan (COZAAR) 100 mg tablet Take 1 Tab by mouth daily. 80 Tab 3       Past Medical History:   Diagnosis Date    Autoimmune disease ([address]     Breast cancer ([address] 2001, 1/ 2014    right, left    Cancer ([address]     Colon polyps     Elevated cholesterol     Enlarged lymph nodes     pericardium    GERD (gastroesophageal reflux disease)     Hypertension     Ill-defined condition 2007 to 2010    lung-ground glass followed by Dr. Lindwood [place] of breast, right 1976    benign    Mass of breast, right 1994    benign    Pure hyperglyceridemia     Thyroid disease        Past Surgical History:   Procedure Laterality Date    HX APPENDECTOMY      HX CATARACT REMOVAL Bilateral 2010    HX CHOLECYSTECTOMY      HX HEMORRHOIDECTOMY      HX HERNIA REPAIR  2004    small abdominal repair and fistula    HX HYSTERECTOMY  1991    cystocele    HX LYMPHADENECTOMY Right 7/2001    right axilla    HX MASTECTOMY Right 2001    with reconstruction    HX MASTECTOMY Left 1/14    HX POLYPECTOMY      [id number] muliple removals    HX RECTOCELE REPAIR  2003       Social History     Social History    Marital status:      Spouse name: N/A    Number of children: N/A    Years of education: N/A     Occupational History    Not on file.      Social History Main Topics    Smoking status: Former Smoker Packs/day: 2.00     Years: 9.00     Quit date: 1/1/1963    Smokeless tobacco: Never Used    Alcohol use 0.5 oz/week     1 Standard drinks or equivalent per week      Comment: occasionally    Drug use: No    Sexual activity: Not Currently     Other Topics Concern    Not on file     Social History Narrative       Patient does not have an advanced directive on file    Visit Vitals    /80 (BP 1 Location: Left arm, BP Patient Position: Sitting)    Pulse 62    Temp 98.1 °F (36.7 °C) (Tympanic)    Resp 16    Ht 5' 1\" (1.549 m)    Wt 144 lb (65.3 kg)    SpO2 98%    BMI 27.21 kg/m2       Physical Exam   No Cervical Lymphadenopathy  No Supraclavicular Lymphadenopathy  Thyroid is Normal  Lungs are clear to ausculation and percussion  Heart:  S1 S2 are normal, No gallops, No mummers  No Carotid Bruits  Abdomen:  Normal Bowel Sounds. No tenderness. No masses. No Hepatomegaly or Splenomegly  LE:  Strong Pedal Pulses. No Edema    BMI:  [facility] Outpatient Visit on 04/12/2017   Component Date Value Ref Range Status    Sodium 04/12/2017 140  136 - 145 mmol/L Final    Potassium 04/12/2017 4.2  3.5 - 5.5 mmol/L Final    Chloride 04/12/2017 101  100 - 108 mmol/L Final    CO2 04/12/2017 29  21 - 32 mmol/L Final    Anion gap 04/12/2017 10  3.0 - 18 mmol/L Final    Glucose 04/12/2017 83  74 - 99 mg/dL Final    BUN 04/12/2017 39* 7.0 - 18 MG/DL Final    Creatinine 04/12/2017 1.54* 0.6 - 1.3 MG/DL Final    BUN/Creatinine ratio 04/12/2017 25* 12 - 20   Final    GFR est AA 04/12/2017 39* >60 ml/min/1.73m2 Final    GFR est non-AA 04/12/2017 32* >60 ml/min/1.73m2 Final    Comment: (NOTE)  Estimated GFR is calculated using the Modification of Diet in Renal   Disease (MDRD) Study equation, reported for both  Americans   (GFRAA) and non- Americans (GFRNA), and normalized to 1.73m2   body surface area. The physician must decide which value applies to   the patient.  The MDRD study equation should only be used in   individuals age 25 or older. It has not been validated for the   following: pregnant women, patients with serious comorbid conditions,   or on certain medications, or persons with extremes of body size,   muscle mass, or nutritional status.  Calcium 04/12/2017 9.4  8.5 - 10.1 MG/DL Final    Bilirubin, total 04/12/2017 0.4  0.2 - 1.0 MG/DL Final    ALT (SGPT) 04/12/2017 34  13 - 56 U/L Final    AST (SGOT) 04/12/2017 21  15 - 37 U/L Final    Alk. phosphatase 04/12/2017 46  45 - 117 U/L Final    Protein, total 04/12/2017 6.8  6.4 - 8.2 g/dL Final    Albumin 04/12/2017 3.6  3.4 - 5.0 g/dL Final    Globulin 04/12/2017 3.2  2.0 - 4.0 g/dL Final    A-G Ratio 04/12/2017 1.1  0.8 - 1.7   Final    WBC 04/12/2017 7.7  4.6 - 13.2 K/uL Final    RBC 04/12/2017 3.61* 4.20 - 5.30 M/uL Final    HGB 04/12/2017 11.3* 12.0 - 16.0 g/dL Final    HCT 04/12/2017 36.9  35.0 - 45.0 % Final    MCV 04/12/2017 102.2* 74.0 - 97.0 FL Final    MCH 04/12/2017 31.3  24.0 - 34.0 PG Final    MCHC 04/12/2017 30.6* 31.0 - 37.0 g/dL Final    RDW 04/12/2017 14.7* 11.6 - 14.5 % Final    PLATELET 61/79/3787 813  135 - 420 K/uL Final    MPV 04/12/2017 9.2  9.2 - 11.8 FL Final    NEUTROPHILS 04/12/2017 59  40 - 73 % Final    LYMPHOCYTES 04/12/2017 27  21 - 52 % Final    MONOCYTES 04/12/2017 10  3 - 10 % Final    EOSINOPHILS 04/12/2017 3  0 - 5 % Final    BASOPHILS 04/12/2017 1  0 - 2 % Final    ABS. NEUTROPHILS 04/12/2017 4.5  1.8 - 8.0 K/UL Final    ABS. LYMPHOCYTES 04/12/2017 2.1  0.9 - 3.6 K/UL Final    ABS. MONOCYTES 04/12/2017 0.8  0.05 - 1.2 K/UL Final    ABS. EOSINOPHILS 04/12/2017 0.2  0.0 - 0.4 K/UL Final    ABS.  BASOPHILS 04/12/2017 0.1* 0.0 - 0.06 K/UL Final    DF 04/12/2017 AUTOMATED    Final    Sed rate, automated 04/12/2017 33* 0 - 30 mm/hr Final    LIPID PROFILE 04/12/2017        Final    Cholesterol, total 04/12/2017 159  <200 MG/DL Final    Triglyceride 04/12/2017 119  <150 MG/DL Final Comment: The drugs N-acetylcysteine (NAC) and  Metamiszole have been found to cause falsely  low results in this chemical assay. Please  be sure to submit blood samples obtained  BEFORE administration of either of these  drugs to assure correct results.       HDL Cholesterol 04/12/2017 83* 40 - 60 MG/DL Final    LDL, calculated 04/12/2017 52.2  0 - 100 MG/DL Final    VLDL, calculated 04/12/2017 23.8  MG/DL Final    CHOL/HDL Ratio 04/12/2017 1.9  0 - 5.0   Final   Office Visit on 03/15/2017   Component Date Value Ref Range Status    Color (UA POC) 03/15/2017 Yellow   Final    Clarity (UA POC) 03/15/2017 Clear   Final    Glucose (UA POC) 03/15/2017 Negative  Negative Final    Bilirubin (UA POC) 03/15/2017 Negative  Negative Final    Ketones (UA POC) 03/15/2017 Negative  Negative Final    Specific gravity (UA POC) 03/15/2017 1.010  1.001 - 1.035 Final    Blood (UA POC) 03/15/2017 Negative  Negative Final    pH (UA POC) 03/15/2017 5.0  4.6 - 8.0 Final    Protein (UA POC) 03/15/2017 Negative  Negative mg/dL Final    Urobilinogen (UA POC) 03/15/2017 0.2 mg/dL  0.2 - 1 Final    Nitrites (UA POC) 03/15/2017 Negative  Negative Final    Leukocyte esterase (UA POC) 03/15/2017 Negative  Negative Final   Hospital Outpatient Visit on 02/13/2017   Component Date Value Ref Range Status    Sed rate, automated 02/13/2017 33* 0 - 30 mm/hr Final    Sodium 02/13/2017 137  136 - 145 mmol/L Final    Potassium 02/13/2017 4.5  3.5 - 5.5 mmol/L Final    Chloride 02/13/2017 101  100 - 108 mmol/L Final    CO2 02/13/2017 26  21 - 32 mmol/L Final    Anion gap 02/13/2017 10  3.0 - 18 mmol/L Final    Glucose 02/13/2017 108* 74 - 99 mg/dL Final    BUN 02/13/2017 56* 7.0 - 18 MG/DL Final    Creatinine 02/13/2017 1.57* 0.6 - 1.3 MG/DL Final    BUN/Creatinine ratio 02/13/2017 36* 12 - 20   Final    GFR est AA 02/13/2017 38* >60 ml/min/1.73m2 Final    GFR est non-AA 02/13/2017 31* >60 ml/min/1.73m2 Final    Comment: (NOTE)  Estimated GFR is calculated using the Modification of Diet in Renal   Disease (MDRD) Study equation, reported for both  Americans   (GFRAA) and non- Americans (GFRNA), and normalized to 1.73m2   body surface area. The physician must decide which value applies to   the patient. The MDRD study equation should only be used in   individuals age 25 or older. It has not been validated for the   following: pregnant women, patients with serious comorbid conditions,   or on certain medications, or persons with extremes of body size,   muscle mass, or nutritional status.  Calcium 02/13/2017 9.0  8.5 - 10.1 MG/DL Final    RA Latex, Ql. 02/13/2017 NEGATIVE   NEG   Final       .No results found for any visits on 04/20/17. Assessment / Plan      ICD-10-CM ICD-9-CM    1. Essential hypertension I10 401.9    2. Pure hypercholesterolemia E78.00 272.0      she was advised to continue her maintenance medications      Follow-up Disposition:  Return in about 6 months (around 10/20/2017). I asked Reyes Howell. Jame Adames if she has any questions and I answered the questions. Alex Adames states that she understands the treatment plan and agrees with the treatment plan

## 2017-04-25 ENCOUNTER — HOSPITAL ENCOUNTER (OUTPATIENT)
Dept: LAB | Age: 82
Discharge: HOME | End: 2017-04-25
Payer: MEDICARE

## 2017-04-25 ENCOUNTER — OFFICE VISIT (OUTPATIENT)
Dept: INTERNAL MEDICINE CLINIC | Age: 82
End: 2017-04-25

## 2017-04-25 VITALS
OXYGEN SATURATION: 99 % | RESPIRATION RATE: 16 BRPM | DIASTOLIC BLOOD PRESSURE: 80 MMHG | HEART RATE: 65 BPM | SYSTOLIC BLOOD PRESSURE: 142 MMHG | BODY MASS INDEX: 27.19 KG/M2 | WEIGHT: 144 LBS | TEMPERATURE: 98.7 F | HEIGHT: 61 IN

## 2017-04-25 DIAGNOSIS — N39.0 URINARY TRACT INFECTION WITHOUT HEMATURIA, SITE UNSPECIFIED: Primary | ICD-10-CM

## 2017-04-25 DIAGNOSIS — N39.0 URINARY TRACT INFECTION WITHOUT HEMATURIA, SITE UNSPECIFIED: ICD-10-CM

## 2017-04-25 LAB
BILIRUB UR QL STRIP: NEGATIVE
GLUCOSE UR-MCNC: NEGATIVE MG/DL
KETONES P FAST UR STRIP-MCNC: NEGATIVE MG/DL
PH UR STRIP: 5.5 [PH] (ref 4.6–8)
PROT UR QL STRIP: NEGATIVE MG/DL
SP GR UR STRIP: 1.01 (ref 1–1.03)
UA UROBILINOGEN AMB POC: NORMAL (ref 0.2–1)
URINALYSIS CLARITY POC: CLEAR
URINALYSIS COLOR POC: YELLOW
URINE BLOOD POC: NEGATIVE
URINE LEUKOCYTES POC: NORMAL
URINE NITRITES POC: NEGATIVE

## 2017-04-25 PROCEDURE — 87086 URINE CULTURE/COLONY COUNT: CPT | Performed by: NURSE PRACTITIONER

## 2017-04-25 RX ORDER — PHENAZOPYRIDINE HYDROCHLORIDE 100 MG/1
100 TABLET, FILM COATED ORAL
Qty: 9 TAB | Refills: 0 | Status: SHIPPED | OUTPATIENT
Start: 2017-04-25 | End: 2017-04-28

## 2017-04-25 RX ORDER — NITROFURANTOIN 25; 75 MG/1; MG/1
100 CAPSULE ORAL 2 TIMES DAILY
Qty: 14 CAP | Refills: 0 | Status: SHIPPED | OUTPATIENT
Start: 2017-04-25 | End: 2017-06-06 | Stop reason: ALTCHOICE

## 2017-04-25 NOTE — PROGRESS NOTES
Patient presents for   Chief Complaint   Patient presents with    Urinary Pain     x3 days     Fall risk assessment was not indicated. Depression screening was not indicated Follow up questions were not indicated. 1. Have you been to the ER, urgent care clinic since your last visit? Hospitalized since your last visit? No    2. Have you seen or consulted any other health care providers outside of the [address] [place] [address] since your last visit? Include any pap smears or colon screening.  No    POC urin performed per provider order, provider made aware of results

## 2017-04-25 NOTE — PATIENT INSTRUCTIONS

## 2017-04-25 NOTE — PROGRESS NOTES
Kinsey Sharma is a 80 y.o. female presenting today for Urinary Pain (x3 days)  . HPI:  Kinsey Sharma presents to the office today for abdominal pain, dysuria, frequency and urgency x 3 days. She had a fever x 1 day. Review of Systems   Respiratory: Negative for cough and shortness of breath. Cardiovascular: Negative for chest pain and palpitations. Gastrointestinal: Positive for abdominal pain. Negative for nausea and vomiting. Genitourinary: Positive for dysuria, frequency and urgency. Allergies   Allergen Reactions    Codeine Hives    Demerol [Meperidine] Nausea Only    Levaquin [Levofloxacin] Other (comments)     Swelling in feet and legs       Current Outpatient Prescriptions   Medication Sig Dispense Refill    nitrofurantoin, macrocrystal-monohydrate, (MACROBID) 100 mg capsule Take 1 Cap by mouth two (2) times a day. 14 Cap 0    phenazopyridine (PYRIDIUM) 100 mg tablet Take 1 Tab by mouth three (3) times daily (after meals) for 3 days. 9 Tab 0    hydrALAZINE (APRESOLINE) 25 mg tablet TAKE 1 TABLET BY MOUTH TWICE DAILY. STOP VERAPAMIL 180 Tab 2    guanFACINE (TENEX) 1 mg tablet TAKE 1/2 TABLET BY MOUTH EVERY DAY. 30 Tab 0    predniSONE (DELTASONE) 5 mg tablet Take 3 tablets by mouth daily for 1 week. Then 2.5 tablets by mouth daily for 1 week. Then 2 tablets by mouth until further notice. 70 Tab 1    peg 400-propylene glycol (SYSTANE, PROPYLENE GLYCOL,) 0.4-0.3 % drop Administer  to left eye as needed.  allopurinol (ZYLOPRIM) 100 mg tablet TAKE 1/2 TABLET BY MOUTH DAILY 30 Tab 0    GLUCOSAMINE HCL/CHONDR LEGER A NA (OSTEO BI-FLEX PO) Take  by mouth.  doxepin (SINEQUAN) 25 mg capsule Take 1 Cap by mouth nightly. 90 Cap 3    simvastatin (ZOCOR) 20 mg tablet Take 1 Tab by mouth nightly. 90 Tab 3    ranitidine (ZANTAC) 300 mg tablet Take 1 Tab by mouth daily. 90 Tab 3    levothyroxine (SYNTHROID) 100 mcg tablet Take 1 Tab by mouth nightly.  90 Tab 3    furosemide (LASIX) 20 mg tablet 1 tablet each AM 90 Tab 3    losartan (COZAAR) 100 mg tablet Take 1 Tab by mouth daily. 90 Tab 3    metoprolol tartrate (LOPRESSOR) 50 mg tablet Take 1 Tab by mouth two (2) times a day. 180 Tab 3    cyanocobalamin 1,000 mcg tablet Take 1,000 mcg by mouth daily.  aspirin delayed-release 81 mg tablet Take  by mouth daily.  folic acid 324 mcg tablet Take 800 mcg by mouth daily.  CALCIUM PHOSPHATE TRIB/VIT D3 (CITRACAL + D PO) Take  by mouth daily.  multivitamins-minerals-lutein (CENTRUM SILVER) tab tablet Take 1 Tab by mouth daily.  loratadine (CLARITIN) 10 mg tablet Take 10 mg by mouth daily.  tamoxifen (NOLVADEX) 20 mg tablet Take 20 mg by mouth daily.  clobetasol (OLUX) 0.05 % topical foam Apply  to affected area two (2) times daily as needed for Skin Irritation. use thin film on affected area      verapamil ER (CALAN-SR) 180 mg CR tablet TAKE 1 TABLET BY MOUTH DAILY.  STOP LOSARTAN 90 Tab 2       Past Medical History:   Diagnosis Date    Autoimmune disease ([address]     Breast cancer ([address] 2001, 1/ 2014    right, left    Cancer ([address]     Colon polyps     Elevated cholesterol     Enlarged lymph nodes     pericardium    GERD (gastroesophageal reflux disease)     Hypertension     Ill-defined condition 2007 to 2010    lung-ground glass followed by Dr. Sánchez Joyner of breast, right 1976    benign    Mass of breast, right 1994    benign    Pure hyperglyceridemia     Thyroid disease        Past Surgical History:   Procedure Laterality Date    HX APPENDECTOMY      HX CATARACT REMOVAL Bilateral 2010    HX CHOLECYSTECTOMY      HX HEMORRHOIDECTOMY      HX HERNIA REPAIR  2004    small abdominal repair and fistula    HX HYSTERECTOMY  1991    cystocele    HX LYMPHADENECTOMY Right 7/2001    right axilla    HX MASTECTOMY Right 2001    with reconstruction    HX MASTECTOMY Left 1/14    HX POLYPECTOMY [id number] muliple removals    HX RECTOCELE REPAIR  2003       Social History     Social History    Marital status:      Spouse name: N/A    Number of children: N/A    Years of education: N/A     Occupational History    Not on file. Social History Main Topics    Smoking status: Former Smoker     Packs/day: 2.00     Years: 9.00     Quit date: 1/1/1963    Smokeless tobacco: Never Used    Alcohol use 0.5 oz/week     1 Standard drinks or equivalent per week      Comment: occasionally    Drug use: No    Sexual activity: Not Currently     Other Topics Concern    Not on file     Social History Narrative       Patient does not have an advanced directive on file    Vitals:    04/25/17 0938   BP: 142/80   Pulse: 65   Resp: 16   Temp: 98.7 °F (37.1 °C)   TempSrc: Tympanic   SpO2: 99%   Weight: 144 lb (65.3 kg)   Height: 5' 1\" (1.549 m)   PainSc:   0 - No pain       Physical Exam   Constitutional: No distress. Cardiovascular: Normal rate and normal heart sounds. Pulmonary/Chest: Effort normal and breath sounds normal.   Abdominal: Soft. Nursing note and vitals reviewed.       Hospital Outpatient Visit on 04/12/2017   Component Date Value Ref Range Status    Sodium 04/12/2017 140  136 - 145 mmol/L Final    Potassium 04/12/2017 4.2  3.5 - 5.5 mmol/L Final    Chloride 04/12/2017 101  100 - 108 mmol/L Final    CO2 04/12/2017 29  21 - 32 mmol/L Final    Anion gap 04/12/2017 10  3.0 - 18 mmol/L Final    Glucose 04/12/2017 83  74 - 99 mg/dL Final    BUN 04/12/2017 39* 7.0 - 18 MG/DL Final    Creatinine 04/12/2017 1.54* 0.6 - 1.3 MG/DL Final    BUN/Creatinine ratio 04/12/2017 25* 12 - 20   Final    GFR est AA 04/12/2017 39* >60 ml/min/1.73m2 Final    GFR est non-AA 04/12/2017 32* >60 ml/min/1.73m2 Final    Comment: (NOTE)  Estimated GFR is calculated using the Modification of Diet in Renal   Disease (MDRD) Study equation, reported for both  Americans   (GFRAA) and non- Americans [facility]), and normalized to 1.73m2   body surface area. The physician must decide which value applies to   the patient. The MDRD study equation should only be used in   individuals age 25 or older. It has not been validated for the   following: pregnant women, patients with serious comorbid conditions,   or on certain medications, or persons with extremes of body size,   muscle mass, or nutritional status.  Calcium 04/12/2017 9.4  8.5 - 10.1 MG/DL Final    Bilirubin, total 04/12/2017 0.4  0.2 - 1.0 MG/DL Final    ALT (SGPT) 04/12/2017 34  13 - 56 U/L Final    AST (SGOT) 04/12/2017 21  15 - 37 U/L Final    Alk. phosphatase 04/12/2017 46  45 - 117 U/L Final    Protein, total 04/12/2017 6.8  6.4 - 8.2 g/dL Final    Albumin 04/12/2017 3.6  3.4 - 5.0 g/dL Final    Globulin 04/12/2017 3.2  2.0 - 4.0 g/dL Final    A-G Ratio 04/12/2017 1.1  0.8 - 1.7   Final    WBC 04/12/2017 7.7  4.6 - 13.2 K/uL Final    RBC 04/12/2017 3.61* 4.20 - 5.30 M/uL Final    HGB 04/12/2017 11.3* 12.0 - 16.0 g/dL Final    HCT 04/12/2017 36.9  35.0 - 45.0 % Final    MCV 04/12/2017 102.2* 74.0 - 97.0 FL Final    MCH 04/12/2017 31.3  24.0 - 34.0 PG Final    MCHC 04/12/2017 30.6* 31.0 - 37.0 g/dL Final    RDW 04/12/2017 14.7* 11.6 - 14.5 % Final    PLATELET 70/96/7691 488  135 - 420 K/uL Final    MPV 04/12/2017 9.2  9.2 - 11.8 FL Final    NEUTROPHILS 04/12/2017 59  40 - 73 % Final    LYMPHOCYTES 04/12/2017 27  21 - 52 % Final    MONOCYTES 04/12/2017 10  3 - 10 % Final    EOSINOPHILS 04/12/2017 3  0 - 5 % Final    BASOPHILS 04/12/2017 1  0 - 2 % Final    ABS. NEUTROPHILS 04/12/2017 4.5  1.8 - 8.0 K/UL Final    ABS. LYMPHOCYTES 04/12/2017 2.1  0.9 - 3.6 K/UL Final    ABS. MONOCYTES 04/12/2017 0.8  0.05 - 1.2 K/UL Final    ABS. EOSINOPHILS 04/12/2017 0.2  0.0 - 0.4 K/UL Final    ABS.  BASOPHILS 04/12/2017 0.1* 0.0 - 0.06 K/UL Final    DF 04/12/2017 AUTOMATED    Final    Sed rate, automated 04/12/2017 33* 0 - 30 mm/hr Final  LIPID PROFILE 04/12/2017        Final    Cholesterol, total 04/12/2017 159  <200 MG/DL Final    Triglyceride 04/12/2017 119  <150 MG/DL Final    Comment: The drugs N-acetylcysteine (NAC) and  Metamiszole have been found to cause falsely  low results in this chemical assay. Please  be sure to submit blood samples obtained  BEFORE administration of either of these  drugs to assure correct results.       HDL Cholesterol 04/12/2017 83* 40 - 60 MG/DL Final    LDL, calculated 04/12/2017 52.2  0 - 100 MG/DL Final    VLDL, calculated 04/12/2017 23.8  MG/DL Final    CHOL/HDL Ratio 04/12/2017 1.9  0 - 5.0   Final   Office Visit on 03/15/2017   Component Date Value Ref Range Status    Color (UA POC) 03/15/2017 Yellow   Final    Clarity (UA POC) 03/15/2017 Clear   Final    Glucose (UA POC) 03/15/2017 Negative  Negative Final    Bilirubin (UA POC) 03/15/2017 Negative  Negative Final    Ketones (UA POC) 03/15/2017 Negative  Negative Final    Specific gravity (UA POC) 03/15/2017 1.010  1.001 - 1.035 Final    Blood (UA POC) 03/15/2017 Negative  Negative Final    pH (UA POC) 03/15/2017 5.0  4.6 - 8.0 Final    Protein (UA POC) 03/15/2017 Negative  Negative mg/dL Final    Urobilinogen (UA POC) 03/15/2017 0.2 mg/dL  0.2 - 1 Final    Nitrites (UA POC) 03/15/2017 Negative  Negative Final    Leukocyte esterase (UA POC) 03/15/2017 Negative  Negative Final   Hospital Outpatient Visit on 02/13/2017   Component Date Value Ref Range Status    Sed rate, automated 02/13/2017 33* 0 - 30 mm/hr Final    Sodium 02/13/2017 137  136 - 145 mmol/L Final    Potassium 02/13/2017 4.5  3.5 - 5.5 mmol/L Final    Chloride 02/13/2017 101  100 - 108 mmol/L Final    CO2 02/13/2017 26  21 - 32 mmol/L Final    Anion gap 02/13/2017 10  3.0 - 18 mmol/L Final    Glucose 02/13/2017 108* 74 - 99 mg/dL Final    BUN 02/13/2017 56* 7.0 - 18 MG/DL Final    Creatinine 02/13/2017 1.57* 0.6 - 1.3 MG/DL Final    BUN/Creatinine ratio 02/13/2017 39* 12 - 20   Final    GFR est AA 02/13/2017 38* >60 ml/min/1.73m2 Final    GFR est non-AA 02/13/2017 31* >60 ml/min/1.73m2 Final    Comment: (NOTE)  Estimated GFR is calculated using the Modification of Diet in Renal   Disease (MDRD) Study equation, reported for both  Americans   (GFRAA) and non- Americans (GFRNA), and normalized to 1.73m2   body surface area. The physician must decide which value applies to   the patient. The MDRD study equation should only be used in   individuals age 25 or older. It has not been validated for the   following: pregnant women, patients with serious comorbid conditions,   or on certain medications, or persons with extremes of body size,   muscle mass, or nutritional status.  Calcium 02/13/2017 9.0  8.5 - 10.1 MG/DL Final    RA Latex, Ql. 02/13/2017 NEGATIVE   NEG   Final       .No results found for any visits on 04/25/17. Assessment / Plan:      ICD-10-CM ICD-9-CM    1. Urinary tract infection without hematuria, site unspecified N39.0 599.0 nitrofurantoin, macrocrystal-monohydrate, (MACROBID) 100 mg capsule      phenazopyridine (PYRIDIUM) 100 mg tablet      AMB POC URINALYSIS DIP STICK AUTO W/O MICRO      CULTURE, URINE     Macrobid rx  Pyridium rx  POC- UA- + Leuk's  F/u prn or no improvement    Follow-up Disposition:  Return if symptoms worsen or fail to improve. I asked the patient if she  had any questions and answered her  questions.   The patient stated that she understands the treatment plan and agrees with the treatment plan

## 2017-04-25 NOTE — MR AVS SNAPSHOT
Visit Information Date & Time Provider Department Dept. Phone Encounter #  
 4/25/2017  9:30 AM Kali Keita NP [facility] INTER[facility]ICINE OF Janene Evans [phone] [id number] Your Appointments 9/6/2017 12:30 PM  
Follow Up with Alcides Goel MD  
[address] [facility] Appt Note: 5 mo f/u  
 [address], [address] [place] [address]  
  
   
 [address], [address] [place] [address] Upcoming Health Maintenance Date Due  
 MEDICARE YEARLY EXAM 8/23/1997 GLAUCOMA SCREENING Q2Y 3/1/2018 DTaP/Tdap/Td series (2 - Td) 10/20/2026 Allergies as of 4/25/2017  Review Complete On: 4/25/2017 By: Anshul Boudreaux LPN Severity Noted Reaction Type Reactions Codeine  08/04/2014    Hives Demerol [Meperidine]  08/04/2014    Nausea Only Levaquin [Levofloxacin]  08/04/2014    Other (comments) Swelling in feet and legs Current Immunizations  Never Reviewed Name Date Influenza High Dose Vaccine PF 10/13/2016  8:53 AM  
 Influenza Vaccine 10/6/2015 Pneumococcal Conjugate (PCV-13) 10/6/2015 Pneumococcal Polysaccharide (PPSV-23) 4/11/2003, 9/9/1993 Td 7/30/2008, 9/9/1993 Tdap 10/20/2016  8:40 AM  
 Zoster Vaccine, Live 1/14/2009 Not reviewed this visit You Were Diagnosed With   
  
 Codes Comments Urinary tract infection without hematuria, site unspecified    -  Primary ICD-10-CM: N39.0 ICD-9-CM: 599.0 Vitals BP Pulse Temp Resp Height(growth percentile) 142/80 (BP 1 Location: Left arm, BP Patient Position: Sitting) 65 98.7 °F (37.1 °C) (Tympanic) 16 5' 1\" (1.549 m) Weight(growth percentile) SpO2 BMI OB Status Smoking Status 144 lb (65.3 kg) 99% 27.21 kg/m2 Hysterectomy Former Smoker BMI and BSA Data Body Mass Index Body Surface Area  
 27.21 kg/m 2 1.68 m 2 Preferred Pharmacy Pharmacy Name Phone [facility] [address] [place] [address] [phone] Your Updated Medication List  
  
   
This list is accurate as of: 4/25/17  9:56 AM.  Always use your most recent med list.  
  
  
  
  
 allopurinol 100 mg tablet Commonly known as:  ZYLOPRIM  
TAKE 1/2 TABLET BY MOUTH DAILY  
  
 aspirin delayed-release 81 mg tablet Take  by mouth daily. CENTRUM SILVER Tab tablet Generic drug:  multivitamins-minerals-lutein Take 1 Tab by mouth daily. CITRACAL + D PO Take  by mouth daily. clobetasol 0.05 % topical foam  
Commonly known as:  OLUX Apply  to affected area two (2) times daily as needed for Skin Irritation. use thin film on affected area  
  
 cyanocobalamin 1,000 mcg tablet Take 1,000 mcg by mouth daily. doxepin 25 mg capsule Commonly known as:  SINEquan Take 1 Cap by mouth nightly. folic acid 719 mcg tablet Take 800 mcg by mouth daily. furosemide 20 mg tablet Commonly known as:  LASIX  
1 tablet each AM  
  
 guanFACINE IR 1 mg IR tablet Commonly known as:  TENEX  
TAKE 1/2 TABLET BY MOUTH EVERY DAY. hydrALAZINE 25 mg tablet Commonly known as:  APRESOLINE  
TAKE 1 TABLET BY MOUTH TWICE DAILY. STOP VERAPAMIL  
  
 levothyroxine 100 mcg tablet Commonly known as:  SYNTHROID Take 1 Tab by mouth nightly. loratadine 10 mg tablet Commonly known as:  Carmita Lashell Take 10 mg by mouth daily. losartan 100 mg tablet Commonly known as:  COZAAR Take 1 Tab by mouth daily. metoprolol tartrate 50 mg tablet Commonly known as:  LOPRESSOR Take 1 Tab by mouth two (2) times a day. nitrofurantoin (macrocrystal-monohydrate) 100 mg capsule Commonly known as:  MACROBID Take 1 Cap by mouth two (2) times a day. OSTEO BI-FLEX PO Take  by mouth.  
  
 phenazopyridine 100 mg tablet Commonly known as:  PYRIDIUM  
 Take 1 Tab by mouth three (3) times daily (after meals) for 3 days. predniSONE 5 mg tablet Commonly known as:  Edmond Cardenas Take 3 tablets by mouth daily for 1 week. Then 2.5 tablets by mouth daily for 1 week. Then 2 tablets by mouth until further notice. raNITIdine 300 mg tablet Commonly known as:  ZANTAC Take 1 Tab by mouth daily. simvastatin 20 mg tablet Commonly known as:  ZOCOR Take 1 Tab by mouth nightly. SYSTANE (PROPYLENE GLYCOL) 0.4-0.3 % Drop Generic drug:  peg 400-propylene glycol Administer  to left eye as needed. tamoxifen 20 mg tablet Commonly known as:  NOLVADEX Take 20 mg by mouth daily. verapamil  mg CR tablet Commonly known as:  CALAN-SR  
TAKE 1 TABLET BY MOUTH DAILY. STOP LOSARTAN Prescriptions Sent to Pharmacy Refills  
 nitrofurantoin, macrocrystal-monohydrate, (MACROBID) 100 mg capsule 0 Sig: Take 1 Cap by mouth two (2) times a day. Class: Normal  
 Pharmacy: [address] Ph #: [phone] Route: Oral  
 phenazopyridine (PYRIDIUM) 100 mg tablet 0 Sig: Take 1 Tab by mouth three (3) times daily (after meals) for 3 days. Class: Normal  
 Pharmacy: [address] Ph #: [phone] Route: Oral  
  
Patient Instructions Gout: Care Instructions Your Care Instructions Gout is a form of arthritis caused by a buildup of uric acid crystals in a joint. It causes sudden attacks of pain, swelling, redness, and stiffness, usually in one joint, especially the big toe. Gout usually comes on without a cause. But it can be brought on by drinking alcohol (especially beer) or eating seafood and red meat. Taking certain medicines, such as diuretics or aspirin, also can bring on an attack of gout. Taking your medicines as prescribed and following up with your doctor regularly can help you avoid gout attacks in the future. Follow-up care is a key part of your treatment and safety. Be sure to make and go to all appointments, and call your doctor if you are having problems. Its also a good idea to know your test results and keep a list of the medicines you take. How can you care for yourself at home? · If the joint is swollen, put ice or a cold pack on the area for 10 to 20 minutes at a time. Put a thin cloth between the ice and your skin. · Prop up the sore limb on a pillow when you ice it or anytime you sit or lie down during the next 3 days. Try to keep it above the level of your heart. This will help reduce swelling. · Rest sore joints. Avoid activities that put weight or strain on the joints for a few days. Take short rest breaks from your regular activities during the day. · Take your medicines exactly as prescribed. Call your doctor if you think you are having a problem with your medicine. · Take pain medicines exactly as directed. ¨ If the doctor gave you a prescription medicine for pain, take it as prescribed. ¨ If you are not taking a prescription pain medicine, ask your doctor if you can take an over-the-counter medicine. · Eat less seafood and red meat. · Check with your doctor before drinking alcohol. · Losing weight, if you are overweight, may help reduce attacks of gout. But do not go on a [employer]. \" Losing a lot of weight in a short amount of time can cause a gout attack. When should you call for help? Call your doctor now or seek immediate medical care if: 
· You have a fever. · The joint is so painful you cannot use it. · You have sudden, unexplained swelling, redness, warmth, or severe pain in one or more joints. Watch closely for changes in your health, and be sure to contact your doctor if: 
· You have joint pain. · Your symptoms get worse or are not improving after 2 or 3 days. Where can you learn more? Go to http://crystal-gemma.info/. Enter A622 in the search box to learn more about \"Gout: Care Instructions. \" Current as of: October 31, 2016 Content Version: 11.2 © 5213-7826 [employer]. Care instructions adapted under license by [employer] (which disclaims liability or warranty for this information). If you have questions about a medical condition or this instruction, always ask your healthcare professional. [address] any warranty or liability for your use of this information. Introducing [facility]! Bean Tolliver introduces [employer] patient portal. Now you can access parts of your medical record, email your doctor's office, and request medication refills online. 1. In your internet browser, go to [email or web address] [email or web address] 263 2. Click on the First Time User? Click Here link in the Sign In box. You will see the New Member Sign Up page. 3. Enter your [employer] Access Code exactly as it appears below. You will not need to use this code after youve completed the sign-up process. If you do not sign up before the expiration date, you must request a new code. · [employer] Access Code: [email or web address] Expires: 7/19/2017 10:42 AM 
 
4. Enter the last four digits of your Social Security Number (xxxx) and Date of Birth (mm/dd/yyyy) as indicated and click Submit. You will be taken to the next sign-up page. 5. Create a shopatplacest ID. This will be your [employer]Hub login ID and cannot be changed, so think of one that is secure and easy to remember. 6. Create a [employer] password. You can change your password at any time. 7. Enter your Password Reset Question and Answer. This can be used at a later time if you forget your password. 8. Enter your e-mail address.  You will receive e-mail notification when new information is available in [employer]. 9. Click Sign Up. You can now view and download portions of your medical record. 10. Click the Download Summary menu link to download a portable copy of your medical information. If you have questions, please visit the Frequently Asked Questions section of the [employer] website. Remember, [employer] is NOT to be used for urgent needs. For medical emergencies, dial 911. Now available from your iPhone and Android! Please provide this summary of care documentation to your next provider. Your primary care clinician is listed as Donte Herring. If you have any questions after today's visit, please call [phone].

## 2017-04-27 LAB
BACTERIA SPEC CULT: NORMAL
SERVICE CMNT-IMP: NORMAL

## 2017-05-04 ENCOUNTER — TELEPHONE (OUTPATIENT)
Dept: INTERNAL MEDICINE CLINIC | Age: 82
End: 2017-05-04

## 2017-05-04 NOTE — TELEPHONE ENCOUNTER
Dr Rhoda Tellez changed her BP meds couple weeks ago and wanted her to call to let him know how swelling was she stated it was better also she seen [facility] for UTI finished her antibiotic want to know if she needs to leave another Urine sample please call #[phone]

## 2017-05-04 NOTE — TELEPHONE ENCOUNTER
Per Dr. Rossy Guerra, I called patient to let her know to come in and leave a urine sample on Monday. She verbalized understanding. She stated her BP is 133/73, pulse is 71 and having a little swelling in her ankles; R is greater than left but she said it isn't bad.

## 2017-05-08 ENCOUNTER — APPOINTMENT (OUTPATIENT)
Dept: INTERNAL MEDICINE CLINIC | Age: 82
End: 2017-05-08

## 2017-05-30 RX ORDER — PREDNISONE 5 MG/1
TABLET ORAL
Qty: 60 TAB | Refills: 1 | Status: SHIPPED | OUTPATIENT
Start: 2017-05-30 | End: 2017-09-25 | Stop reason: DRUGHIGH

## 2017-05-30 NOTE — TELEPHONE ENCOUNTER
Requested Prescriptions     Pending Prescriptions Disp Refills    predniSONE (DELTASONE) 5 mg tablet 70 Tab 1     Sig: Take 3 tablets by mouth daily for 1 week. Then 2.5 tablets by mouth daily for 1 week. Then 2 tablets by mouth until further notice.

## 2017-06-06 ENCOUNTER — OFFICE VISIT (OUTPATIENT)
Dept: INTERNAL MEDICINE CLINIC | Age: 82
End: 2017-06-06

## 2017-06-06 ENCOUNTER — HOSPITAL ENCOUNTER (OUTPATIENT)
Dept: LAB | Age: 82
Discharge: HOME | End: 2017-06-06
Payer: MEDICARE

## 2017-06-06 VITALS
WEIGHT: 143 LBS | DIASTOLIC BLOOD PRESSURE: 72 MMHG | TEMPERATURE: 98.9 F | RESPIRATION RATE: 16 BRPM | OXYGEN SATURATION: 96 % | SYSTOLIC BLOOD PRESSURE: 150 MMHG | HEART RATE: 66 BPM | HEIGHT: 61 IN | BODY MASS INDEX: 27 KG/M2

## 2017-06-06 DIAGNOSIS — I10 ESSENTIAL HYPERTENSION: ICD-10-CM

## 2017-06-06 DIAGNOSIS — I10 ESSENTIAL HYPERTENSION: Primary | ICD-10-CM

## 2017-06-06 DIAGNOSIS — E78.00 PURE HYPERCHOLESTEROLEMIA: ICD-10-CM

## 2017-06-06 DIAGNOSIS — M35.3 PMR (POLYMYALGIA RHEUMATICA) (HCC): ICD-10-CM

## 2017-06-06 LAB
ALBUMIN SERPL BCP-MCNC: 3.4 G/DL (ref 3.4–5)
ALBUMIN/GLOB SERPL: 0.9 {RATIO} (ref 0.8–1.7)
ALP SERPL-CCNC: 49 U/L (ref 45–117)
ALT SERPL-CCNC: 93 U/L (ref 13–56)
ANION GAP BLD CALC-SCNC: 10 MMOL/L (ref 3–18)
AST SERPL W P-5'-P-CCNC: 30 U/L (ref 15–37)
BASOPHILS # BLD AUTO: 0.1 K/UL (ref 0–0.06)
BASOPHILS # BLD: 1 % (ref 0–2)
BILIRUB SERPL-MCNC: 0.4 MG/DL (ref 0.2–1)
BUN SERPL-MCNC: 33 MG/DL (ref 7–18)
BUN/CREAT SERPL: 24 (ref 12–20)
CALCIUM SERPL-MCNC: 9.6 MG/DL (ref 8.5–10.1)
CHLORIDE SERPL-SCNC: 96 MMOL/L (ref 100–108)
CO2 SERPL-SCNC: 27 MMOL/L (ref 21–32)
CREAT SERPL-MCNC: 1.37 MG/DL (ref 0.6–1.3)
CRP SERPL-MCNC: 0.5 MG/DL (ref 0–0.3)
DIFFERENTIAL METHOD BLD: ABNORMAL
EOSINOPHIL # BLD: 0.1 K/UL (ref 0–0.4)
EOSINOPHIL NFR BLD: 1 % (ref 0–5)
ERYTHROCYTE [DISTWIDTH] IN BLOOD BY AUTOMATED COUNT: 13.8 % (ref 11.6–14.5)
ERYTHROCYTE [SEDIMENTATION RATE] IN BLOOD: 57 MM/HR (ref 0–30)
GLOBULIN SER CALC-MCNC: 3.9 G/DL (ref 2–4)
GLUCOSE SERPL-MCNC: 120 MG/DL (ref 74–99)
HCT VFR BLD AUTO: 34.4 % (ref 35–45)
HGB BLD-MCNC: 11.1 G/DL (ref 12–16)
LYMPHOCYTES # BLD AUTO: 8 % (ref 21–52)
LYMPHOCYTES # BLD: 0.7 K/UL (ref 0.9–3.6)
MCH RBC QN AUTO: 30.5 PG (ref 24–34)
MCHC RBC AUTO-ENTMCNC: 32.3 G/DL (ref 31–37)
MCV RBC AUTO: 94.5 FL (ref 74–97)
MONOCYTES # BLD: 0.3 K/UL (ref 0.05–1.2)
MONOCYTES NFR BLD AUTO: 3 % (ref 3–10)
NEUTS SEG # BLD: 8.1 K/UL (ref 1.8–8)
NEUTS SEG NFR BLD AUTO: 87 % (ref 40–73)
PLATELET # BLD AUTO: 405 K/UL (ref 135–420)
PMV BLD AUTO: 9.2 FL (ref 9.2–11.8)
POTASSIUM SERPL-SCNC: 4.3 MMOL/L (ref 3.5–5.5)
PROT SERPL-MCNC: 7.3 G/DL (ref 6.4–8.2)
RBC # BLD AUTO: 3.64 M/UL (ref 4.2–5.3)
SODIUM SERPL-SCNC: 133 MMOL/L (ref 136–145)
WBC # BLD AUTO: 9.3 K/UL (ref 4.6–13.2)

## 2017-06-06 PROCEDURE — 80053 COMPREHEN METABOLIC PANEL: CPT | Performed by: INTERNAL MEDICINE

## 2017-06-06 PROCEDURE — 86140 C-REACTIVE PROTEIN: CPT | Performed by: INTERNAL MEDICINE

## 2017-06-06 PROCEDURE — 85652 RBC SED RATE AUTOMATED: CPT | Performed by: INTERNAL MEDICINE

## 2017-06-06 PROCEDURE — 85025 COMPLETE CBC W/AUTO DIFF WBC: CPT | Performed by: INTERNAL MEDICINE

## 2017-06-06 NOTE — PROGRESS NOTES
1. Have you been to the ER, urgent care clinic since your last visit? Hospitalized since your last visit? No    2. Have you seen or consulted any other health care providers outside of the [address] [place] [address] since your last visit? Include any pap smears or colon screening.  Dr. Negar Alcantar for previous massectomy

## 2017-06-06 NOTE — MR AVS SNAPSHOT
Visit Information Date & Time Provider Department Dept. Phone Encounter #  
 6/6/2017 12:30 PM Jonah Amezcua MD [facility] [place] [phone] [id number] Follow-up Instructions Return in about 3 weeks (around 6/27/2017). Your Appointments 6/27/2017  9:45 AM  
Follow Up with Jonah Amezcua MD  
[address]) Appt Note: 3 wk f/u  
 [address], [address] [place] [address]  
  
   
 [address], [address] [place] [address]  
  
    
 9/6/2017 12:30 PM  
Follow Up with Jonah Amezcua MD  
[address]) Appt Note: 5 mo f/u  
 [address], [address] [place] [address]  
[phone] Upcoming Health Maintenance Date Due  
 MEDICARE YEARLY EXAM 8/23/1997 INFLUENZA AGE 9 TO ADULT 8/1/2017 GLAUCOMA SCREENING Q2Y 3/1/2018 DTaP/Tdap/Td series (2 - Td) 10/20/2026 Allergies as of 6/6/2017  Review Complete On: 6/6/2017 By: Marc Rao LPN Severity Noted Reaction Type Reactions Codeine  08/04/2014    Hives Demerol [Meperidine]  08/04/2014    Nausea Only Levaquin [Levofloxacin]  08/04/2014    Other (comments) Swelling in feet and legs Current Immunizations  Never Reviewed Name Date Influenza High Dose Vaccine PF 10/13/2016  8:53 AM  
 Influenza Vaccine 10/6/2015 Pneumococcal Conjugate (PCV-13) 10/6/2015 Pneumococcal Polysaccharide (PPSV-23) 4/11/2003, 9/9/1993 Td 7/30/2008, 9/9/1993 Tdap 10/20/2016  8:40 AM  
 Zoster Vaccine, Live 1/14/2009 Not reviewed this visit You Were Diagnosed With   
  
 Codes Comments Essential hypertension    -  Primary ICD-10-CM: I10 
ICD-9-CM: 401.9 PMR (polymyalgia rheumatica) (HCC)     ICD-10-CM: M35.3 ICD-9-CM: 022 Vitals BP Pulse Temp Resp Height(growth percentile) 150/72 (BP 1 Location: Left arm, BP Patient Position: Sitting) 66 98.9 °F (37.2 °C) (Tympanic) 16 5' 1\" (1.549 m) Weight(growth percentile) SpO2 BMI OB Status Smoking Status 143 lb (64.9 kg) 96% 27.02 kg/m2 Hysterectomy Former Smoker Vitals History BMI and BSA Data Body Mass Index Body Surface Area  
 27.02 kg/m 2 1.67 m 2 Preferred Pharmacy Pharm[facility] Name Phone [facility] [address] [place] [address] [phone] Your Updated Medication List  
  
   
This list is accurate as of: 6/6/17  2:45 PM.  Always use your most recent med list.  
  
  
  
  
 allopurinol 100 mg tablet Commonly known as:  ZYLOPRIM  
TAKE 1/2 TABLET BY MOUTH DAILY  
  
 aspirin delayed-release 81 mg tablet Take  by mouth daily. CENTRUM SILVER Tab tablet Generic drug:  multivitamins-minerals-lutein Take 1 Tab by mouth daily. CITRACAL + D PO Take  by mouth daily. clobetasol 0.05 % topical foam  
Commonly known as:  OLUX Apply  to affected area two (2) times daily as needed for Skin Irritation. use thin film on affected area  
  
 cyanocobalamin 1,000 mcg tablet Take 1,000 mcg by mouth daily. doxepin 25 mg capsule Commonly known as:  SINEquan Take 1 Cap by mouth nightly. folic acid 212 mcg tablet Take 800 mcg by mouth daily. furosemide 20 mg tablet Commonly known as:  LASIX  
1 tablet each AM  
  
 guanFACINE IR 1 mg IR tablet Commonly known as:  TENEX  
TAKE 1/2 TABLET BY MOUTH EVERY DAY. hydrALAZINE 25 mg tablet Commonly known as:  APRESOLINE  
TAKE 1 TABLET BY MOUTH TWICE DAILY. STOP VERAPAMIL  
  
 levothyroxine 100 mcg tablet Commonly known as:  SYNTHROID Take 1 Tab by mouth nightly. loratadine 10 mg tablet Commonly known as:  Jeanine Garth Take 10 mg by mouth daily. losartan 100 mg tablet Commonly known as:  COZAAR Take 1 Tab by mouth daily. metoprolol tartrate 50 mg tablet Commonly known as:  LOPRESSOR Take 1 Tab by mouth two (2) times a day. OSTEO BI-FLEX PO Take  by mouth.  
  
 predniSONE 5 mg tablet Commonly known as:  DELTASONE  
2 tablets by mouth daily  
  
 raNITIdine 300 mg tablet Commonly known as:  ZANTAC Take 1 Tab by mouth daily. simvastatin 20 mg tablet Commonly known as:  ZOCOR Take 1 Tab by mouth nightly. SYSTANE (PROPYLENE GLYCOL) 0.4-0.3 % Drop Generic drug:  peg 400-propylene glycol Administer  to left eye as needed. tamoxifen 20 mg tablet Commonly known as:  NOLVADEX Take 20 mg by mouth daily. verapamil  mg CR tablet Commonly known as:  CALAN-SR  
TAKE 1 TABLET BY MOUTH DAILY. STOP LOSARTAN Follow-up Instructions Return in about 3 weeks (around 6/27/2017). Patient Instructions Health Maintenance Due Topic  MEDICARE YEARLY EXAM   
 
 
  
Introducing [employer]! Bethanie Zuñiga introduces [employer] patient portal. Now you can access parts of your medical record, email your doctor's office, and request medication refills online. 1. In your internet browser, go to [email or web address] 2. Click on the First Time User? Click Here link in the Sign In box. You will see the New Member Sign Up page. 3. Enter your [employer] Access Code exactly as it appears below. You will not need to use this code after youve completed the sign-up process. If you do not sign up before the expiration date, you must request a new code. · [employer] Access Code: [email or web address] Expires: 7/19/2017 10:42 AM 
 
4. Enter the last four digits of your Social Security Number (xxxx) and Date of Birth (mm/dd/yyyy) as indicated and click Submit. You will be taken to the next sign-up page. 5. Create a [employer] ID. This will be your [employer] login ID and cannot be changed, so think of one that is secure and easy to remember. 6. Create a [employer]scapes password. You can change your password at any time. 7. Enter your Password Reset Question and Answer. This can be used at a later time if you forget your password. 8. Enter your e-mail address. You will receive e-mail notification when new information is available in [address] 9. Click Sign Up. You can now view and download portions of your medical record. 10. Click the Download Summary menu link to download a portable copy of your medical information. If you have questions, please visit the Frequently Asked Questions section of the [employer] website. Remember, [employer]apes is NOT to be used for urgent needs. For medical emergencies, dial 911. Now available from your iPhone and Android! Please provide this summary of care documentation to your next provider. Your primary care clinician is listed as Ana Cristina Pride. If you have any questions after today's visit, please call [phone].

## 2017-06-08 ENCOUNTER — TELEPHONE (OUTPATIENT)
Dept: INTERNAL MEDICINE CLINIC | Age: 82
End: 2017-06-08

## 2017-06-09 NOTE — TELEPHONE ENCOUNTER
Per Dr Freddi Galeazzi CRP and Sed rate are elevated along with Creatinine--nothing urgent but he would like for patient to come into the office early next week to see Delia Day for follow up

## 2017-06-09 NOTE — TELEPHONE ENCOUNTER
Called and informed patient of message--understanding was voiced and appointment has been scheduled for Monday 6/12 at 946

## 2017-06-12 ENCOUNTER — OFFICE VISIT (OUTPATIENT)
Dept: INTERNAL MEDICINE CLINIC | Age: 82
End: 2017-06-12

## 2017-06-12 VITALS
RESPIRATION RATE: 16 BRPM | BODY MASS INDEX: 26.62 KG/M2 | OXYGEN SATURATION: 97 % | SYSTOLIC BLOOD PRESSURE: 150 MMHG | TEMPERATURE: 98.3 F | HEIGHT: 61 IN | DIASTOLIC BLOOD PRESSURE: 76 MMHG | HEART RATE: 75 BPM | WEIGHT: 141 LBS

## 2017-06-12 DIAGNOSIS — R53.83 FATIGUE, UNSPECIFIED TYPE: Primary | ICD-10-CM

## 2017-06-12 DIAGNOSIS — R89.9 ABNORMAL LABORATORY TEST: ICD-10-CM

## 2017-06-12 DIAGNOSIS — R35.0 FREQUENCY OF URINATION: ICD-10-CM

## 2017-06-12 NOTE — PROGRESS NOTES
Chente Rush is a 80 y.o. female presenting today for Abnormal Lab Results  . HPI:  Chente Rush presents to the office today for abnormal labs results. Patient had recent labs and labs were reviewed with patient and she was given results. Patient noted she continues to feel generalized fatigue and has lack of energy. Patient reported she has not been able to clean her house for greater than 1 month. She noted her symptoms have improved since initially getting ills but the symptoms remain. Review of Systems   Constitutional: Positive for malaise/fatigue. Respiratory: Negative for cough. Cardiovascular: Negative for chest pain and palpitations. Musculoskeletal: Positive for joint pain (intermittent joint pain and swelling). Allergies   Allergen Reactions    Codeine Hives    Demerol [Meperidine] Nausea Only    Levaquin [Levofloxacin] Other (comments)     Swelling in feet and legs       Current Outpatient Prescriptions   Medication Sig Dispense Refill    predniSONE (DELTASONE) 5 mg tablet 2 tablets by mouth daily (Patient taking differently: Take 3 tablets daily for 10 days then 2 tablets by mouth daily) 60 Tab 1    hydrALAZINE (APRESOLINE) 25 mg tablet TAKE 1 TABLET BY MOUTH TWICE DAILY. STOP VERAPAMIL 180 Tab 2    guanFACINE (TENEX) 1 mg tablet TAKE 1/2 TABLET BY MOUTH EVERY DAY. 30 Tab 0    peg 400-propylene glycol (SYSTANE, PROPYLENE GLYCOL,) 0.4-0.3 % drop Administer  to left eye as needed.  allopurinol (ZYLOPRIM) 100 mg tablet TAKE 1/2 TABLET BY MOUTH DAILY 30 Tab 0    GLUCOSAMINE HCL/CHONDR LEGER A NA (OSTEO BI-FLEX PO) Take  by mouth.  doxepin (SINEQUAN) 25 mg capsule Take 1 Cap by mouth nightly. 90 Cap 3    simvastatin (ZOCOR) 20 mg tablet Take 1 Tab by mouth nightly. 90 Tab 3    ranitidine (ZANTAC) 300 mg tablet Take 1 Tab by mouth daily. 90 Tab 3    levothyroxine (SYNTHROID) 100 mcg tablet Take 1 Tab by mouth nightly.  90 Tab 3    furosemide (LASIX) 20 mg tablet 1 tablet each AM 90 Tab 3    metoprolol tartrate (LOPRESSOR) 50 mg tablet Take 1 Tab by mouth two (2) times a day. 180 Tab 3    cyanocobalamin 1,000 mcg tablet Take 1,000 mcg by mouth daily.  aspirin delayed-release 81 mg tablet Take  by mouth daily.  folic acid 397 mcg tablet Take 800 mcg by mouth daily.  CALCIUM PHOSPHATE TRIB/VIT D3 (CITRACAL + D PO) Take  by mouth daily.  multivitamins-minerals-lutein (CENTRUM SILVER) tab tablet Take 1 Tab by mouth daily.  loratadine (CLARITIN) 10 mg tablet Take 10 mg by mouth daily.  tamoxifen (NOLVADEX) 20 mg tablet Take 20 mg by mouth daily.  clobetasol (OLUX) 0.05 % topical foam Apply  to affected area two (2) times daily as needed for Skin Irritation.  use thin film on affected area         Past Medical History:   Diagnosis Date    Autoimmune disease ([address]     Breast cancer ([facility] [address] 2001, 1/ 2014    right, left    Cancer ([facility] [address]     Colon polyps     Elevated cholesterol     Enlarged lymph nodes     pericardium    GERD (gastroesophageal reflux disease)     Hypertension     Ill-defined condition 2007 to 2010    lung-ground glass followed by Dr. Jossy Rondon of breast, right 1976    benign    Mass of breast, right 1994    benign    Pure hyperglyceridemia     Thyroid disease        Past Surgical History:   Procedure Laterality Date    HX APPENDECTOMY      HX CATARACT REMOVAL Bilateral 2010    HX CHOLECYSTECTOMY      HX HEMORRHOIDECTOMY      HX HERNIA REPAIR  2004    small abdominal repair and fistula    HX HYSTERECTOMY  1991    cystocele    HX LYMPHADENECTOMY Right 7/2001    right axilla    HX MASTECTOMY Right 2001    with reconstruction    HX MASTECTOMY Left 1/14    HX POLYPECTOMY      [id number] muliple removals    HX RECTOCELE REPAIR  2003       Social History     Social History    Marital status:      Spouse name: N/A    Number of children: N/A    Years of education: N/A     Occupational History    Not on file. Social History Main Topics    Smoking status: Former Smoker     Packs/day: 2.00     Years: 9.00     Quit date: 1/1/1963    Smokeless tobacco: Never Used    Alcohol use 0.5 oz/week     1 Standard drinks or equivalent per week      Comment: occasionally    Drug use: No    Sexual activity: Not Currently     Other Topics Concern    Not on file     Social History Narrative       Patient does have an advanced directive on file    Vitals:    06/12/17 1027   BP: 150/76   Pulse: 75   Resp: 16   Temp: 98.3 °F (36.8 °C)   TempSrc: Tympanic   SpO2: 97%   Weight: 141 lb (64 kg)   Height: 5' 1\" (1.549 m)   PainSc:   0 - No pain       Physical Exam   Constitutional: No distress. Cardiovascular: Normal rate, regular rhythm and normal heart sounds. Pulmonary/Chest: Effort normal and breath sounds normal.   Lymphadenopathy:     She has no cervical adenopathy. Neurological: She is alert. Skin: Skin is warm. Hospital Outpatient Visit on 06/06/2017   Component Date Value Ref Range Status    WBC 06/06/2017 9.3  4.6 - 13.2 K/uL Final    RBC 06/06/2017 3.64* 4.20 - 5.30 M/uL Final    HGB 06/06/2017 11.1* 12.0 - 16.0 g/dL Final    HCT 06/06/2017 34.4* 35.0 - 45.0 % Final    MCV 06/06/2017 94.5  74.0 - 97.0 FL Final    MCH 06/06/2017 30.5  24.0 - 34.0 PG Final    MCHC 06/06/2017 32.3  31.0 - 37.0 g/dL Final    RDW 06/06/2017 13.8  11.6 - 14.5 % Final    PLATELET 66/09/4345 923  135 - 420 K/uL Final    MPV 06/06/2017 9.2  9.2 - 11.8 FL Final    NEUTROPHILS 06/06/2017 87* 40 - 73 % Final    LYMPHOCYTES 06/06/2017 8* 21 - 52 % Final    MONOCYTES 06/06/2017 3  3 - 10 % Final    EOSINOPHILS 06/06/2017 1  0 - 5 % Final    BASOPHILS 06/06/2017 1  0 - 2 % Final    ABS. NEUTROPHILS 06/06/2017 8.1* 1.8 - 8.0 K/UL Final    ABS. LYMPHOCYTES 06/06/2017 0.7* 0.9 - 3.6 K/UL Final    ABS. MONOCYTES 06/06/2017 0.3  0.05 - 1.2 K/UL Final    ABS.  EOSINOPHILS 06/06/2017 0.1  0.0 - 0.4 K/UL Final    ABS. BASOPHILS 06/06/2017 0.1* 0.0 - 0.06 K/UL Final    DF 06/06/2017 AUTOMATED    Final    Sodium 06/06/2017 133* 136 - 145 mmol/L Final    Potassium 06/06/2017 4.3  3.5 - 5.5 mmol/L Final    Chloride 06/06/2017 96* 100 - 108 mmol/L Final    CO2 06/06/2017 27  21 - 32 mmol/L Final    Anion gap 06/06/2017 10  3.0 - 18 mmol/L Final    Glucose 06/06/2017 120* 74 - 99 mg/dL Final    BUN 06/06/2017 33* 7.0 - 18 MG/DL Final    Creatinine 06/06/2017 1.37* 0.6 - 1.3 MG/DL Final    BUN/Creatinine ratio 06/06/2017 24* 12 - 20   Final    GFR est AA 06/06/2017 45* >60 ml/min/1.73m2 Final    GFR est non-AA 06/06/2017 37* >60 ml/min/1.73m2 Final    Comment: (NOTE)  Estimated GFR is calculated using the Modification of Diet in Renal   Disease (MDRD) Study equation, reported for both  Americans   (GFRAA) and non- Americans (GFRNA), and normalized to 1.73m2   body surface area. The physician must decide which value applies to   the patient. The MDRD study equation should only be used in   individuals age 25 or older. It has not been validated for the   following: pregnant women, patients with serious comorbid conditions,   or on certain medications, or persons with extremes of body size,   muscle mass, or nutritional status.  Calcium 06/06/2017 9.6  8.5 - 10.1 MG/DL Final    Bilirubin, total 06/06/2017 0.4  0.2 - 1.0 MG/DL Final    ALT (SGPT) 06/06/2017 93* 13 - 56 U/L Final    AST (SGOT) 06/06/2017 30  15 - 37 U/L Final    Alk.  phosphatase 06/06/2017 49  45 - 117 U/L Final    Protein, total 06/06/2017 7.3  6.4 - 8.2 g/dL Final    Albumin 06/06/2017 3.4  3.4 - 5.0 g/dL Final    Globulin 06/06/2017 3.9  2.0 - 4.0 g/dL Final    A-G Ratio 06/06/2017 0.9  0.8 - 1.7   Final    C-Reactive protein 06/06/2017 0.5* 0 - 0.3 mg/dL Final    Sed rate, automated 06/06/2017 57* 0 - 30 mm/hr Final   Hospital Outpatient Visit on 04/25/2017   Component Date Value Ref Range Status    Special Requests: 04/25/2017 NO SPECIAL REQUESTS    Final    Culture result: 04/25/2017 NO GROWTH 2 DAYS    Final   Office Visit on 04/25/2017   Component Date Value Ref Range Status    Color (UA POC) 04/25/2017 Yellow   Final    Clarity (UA POC) 04/25/2017 Clear   Final    Glucose (UA POC) 04/25/2017 Negative  Negative Final    Bilirubin (UA POC) 04/25/2017 Negative  Negative Final    Ketones (UA POC) 04/25/2017 Negative  Negative Final    Specific gravity (UA POC) 04/25/2017 1.010  1.001 - 1.035 Final    Blood (UA POC) 04/25/2017 Negative  Negative Final    pH (UA POC) 04/25/2017 5.5  4.6 - 8.0 Final    Protein (UA POC) 04/25/2017 Negative  Negative mg/dL Final    Urobilinogen (UA POC) 04/25/2017 0.2 mg/dL  0.2 - 1 Final    Nitrites (UA POC) 04/25/2017 Negative  Negative Final    Leukocyte esterase (UA POC) 04/25/2017 1+  Negative Final   Hospital Outpatient Visit on 04/12/2017   Component Date Value Ref Range Status    Sodium 04/12/2017 140  136 - 145 mmol/L Final    Potassium 04/12/2017 4.2  3.5 - 5.5 mmol/L Final    Chloride 04/12/2017 101  100 - 108 mmol/L Final    CO2 04/12/2017 29  21 - 32 mmol/L Final    Anion gap 04/12/2017 10  3.0 - 18 mmol/L Final    Glucose 04/12/2017 83  74 - 99 mg/dL Final    BUN 04/12/2017 39* 7.0 - 18 MG/DL Final    Creatinine 04/12/2017 1.54* 0.6 - 1.3 MG/DL Final    BUN/Creatinine ratio 04/12/2017 25* 12 - 20   Final    GFR est AA 04/12/2017 39* >60 ml/min/1.73m2 Final    GFR est non-AA 04/12/2017 32* >60 ml/min/1.73m2 Final    Comment: (NOTE)  Estimated GFR is calculated using the Modification of Diet in Renal   Disease (MDRD) Study equation, reported for both  Americans   (GFRAA) and non- Americans (GFRNA), and normalized to 1.73m2   body surface area. The physician must decide which value applies to   the patient. The MDRD study equation should only be used in   individuals age 25 or older.  It has not been validated for the following: pregnant women, patients with serious comorbid conditions,   or on certain medications, or persons with extremes of body size,   muscle mass, or nutritional status.  Calcium 04/12/2017 9.4  8.5 - 10.1 MG/DL Final    Bilirubin, total 04/12/2017 0.4  0.2 - 1.0 MG/DL Final    ALT (SGPT) 04/12/2017 34  13 - 56 U/L Final    AST (SGOT) 04/12/2017 21  15 - 37 U/L Final    Alk. phosphatase 04/12/2017 46  45 - 117 U/L Final    Protein, total 04/12/2017 6.8  6.4 - 8.2 g/dL Final    Albumin 04/12/2017 3.6  3.4 - 5.0 g/dL Final    Globulin 04/12/2017 3.2  2.0 - 4.0 g/dL Final    A-G Ratio 04/12/2017 1.1  0.8 - 1.7   Final    WBC 04/12/2017 7.7  4.6 - 13.2 K/uL Final    RBC 04/12/2017 3.61* 4.20 - 5.30 M/uL Final    HGB 04/12/2017 11.3* 12.0 - 16.0 g/dL Final    HCT 04/12/2017 36.9  35.0 - 45.0 % Final    MCV 04/12/2017 102.2* 74.0 - 97.0 FL Final    MCH 04/12/2017 31.3  24.0 - 34.0 PG Final    MCHC 04/12/2017 30.6* 31.0 - 37.0 g/dL Final    RDW 04/12/2017 14.7* 11.6 - 14.5 % Final    PLATELET 57/31/7021 737  135 - 420 K/uL Final    MPV 04/12/2017 9.2  9.2 - 11.8 FL Final    NEUTROPHILS 04/12/2017 59  40 - 73 % Final    LYMPHOCYTES 04/12/2017 27  21 - 52 % Final    MONOCYTES 04/12/2017 10  3 - 10 % Final    EOSINOPHILS 04/12/2017 3  0 - 5 % Final    BASOPHILS 04/12/2017 1  0 - 2 % Final    ABS. NEUTROPHILS 04/12/2017 4.5  1.8 - 8.0 K/UL Final    ABS. LYMPHOCYTES 04/12/2017 2.1  0.9 - 3.6 K/UL Final    ABS. MONOCYTES 04/12/2017 0.8  0.05 - 1.2 K/UL Final    ABS. EOSINOPHILS 04/12/2017 0.2  0.0 - 0.4 K/UL Final    ABS.  BASOPHILS 04/12/2017 0.1* 0.0 - 0.06 K/UL Final    DF 04/12/2017 AUTOMATED    Final    Sed rate, automated 04/12/2017 33* 0 - 30 mm/hr Final    LIPID PROFILE 04/12/2017        Final    Cholesterol, total 04/12/2017 159  <200 MG/DL Final    Triglyceride 04/12/2017 119  <150 MG/DL Final    Comment: The drugs N-acetylcysteine (NAC) and  Metamiszole have been found to cause falsely  low results in this chemical assay. Please  be sure to submit blood samples obtained  BEFORE administration of either of these  drugs to assure correct results.  HDL Cholesterol 04/12/2017 83* 40 - 60 MG/DL Final    LDL, calculated 04/12/2017 52.2  0 - 100 MG/DL Final    VLDL, calculated 04/12/2017 23.8  MG/DL Final    CHOL/HDL Ratio 04/12/2017 1.9  0 - 5.0   Final   Office Visit on 03/15/2017   Component Date Value Ref Range Status    Color (UA POC) 03/15/2017 Yellow   Final    Clarity (UA POC) 03/15/2017 Clear   Final    Glucose (UA POC) 03/15/2017 Negative  Negative Final    Bilirubin (UA POC) 03/15/2017 Negative  Negative Final    Ketones (UA POC) 03/15/2017 Negative  Negative Final    Specific gravity (UA POC) 03/15/2017 1.010  1.001 - 1.035 Final    Blood (UA POC) 03/15/2017 Negative  Negative Final    pH (UA POC) 03/15/2017 5.0  4.6 - 8.0 Final    Protein (UA POC) 03/15/2017 Negative  Negative mg/dL Final    Urobilinogen (UA POC) 03/15/2017 0.2 mg/dL  0.2 - 1 Final    Nitrites (UA POC) 03/15/2017 Negative  Negative Final    Leukocyte esterase (UA POC) 03/15/2017 Negative  Negative Final       .No results found for any visits on 06/12/17. Assessment / Plan:      ICD-10-CM ICD-9-CM    1. Fatigue, unspecified type R53.83 780.79    2. Frequency of urination R35.0 788.41    3. Abnormal laboratory test R89.9 796.4      Patient unable to provide urine specimen- will return if frequency continues over the next week  Reviewed labs with patient  Fatigue/joint swelling- continue Prednisone as previously ordered      Follow-up Disposition:  Return in about 1 month (around 7/12/2017), or if symptoms worsen or fail to improve. I asked the patient if she  had any questions and answered her  questions.   The patient stated that she understands the treatment plan and agrees with the treatment plan

## 2017-06-12 NOTE — PROGRESS NOTES
Patient presents for   Chief Complaint   Patient presents with    Abnormal Lab Results     Fall risk assessment was not indicated. Depression screening was not indicated Follow up questions were not indicated. 1. Have you been to the ER, urgent care clinic since your last visit? Hospitalized since your last visit? No    2. Have you seen or consulted any other health care providers outside of the [address] [place] [address] since your last visit? Include any pap smears or colon screening.  No    Poc urine per perfomed per provider order, provider made aware of results

## 2017-06-13 ENCOUNTER — HOSPITAL ENCOUNTER (OUTPATIENT)
Dept: LAB | Age: 82
Discharge: HOME | End: 2017-06-13
Payer: MEDICARE

## 2017-06-13 ENCOUNTER — LAB ONLY (OUTPATIENT)
Dept: INTERNAL MEDICINE CLINIC | Age: 82
End: 2017-06-13

## 2017-06-13 DIAGNOSIS — R35.0 FREQUENCY OF URINATION: ICD-10-CM

## 2017-06-13 DIAGNOSIS — R35.0 FREQUENCY OF URINATION: Primary | ICD-10-CM

## 2017-06-13 LAB
APPEARANCE UR: CLEAR
BACTERIA URNS QL MICRO: NEGATIVE /HPF
BILIRUB UR QL STRIP: NEGATIVE
BILIRUB UR QL: NEGATIVE
COLOR UR: YELLOW
EPITH CASTS URNS QL MICRO: ABNORMAL /LPF (ref 0–5)
GLUCOSE UR STRIP.AUTO-MCNC: NEGATIVE MG/DL
GLUCOSE UR-MCNC: NEGATIVE MG/DL
HGB UR QL STRIP: ABNORMAL
KETONES P FAST UR STRIP-MCNC: NEGATIVE MG/DL
KETONES UR QL STRIP.AUTO: NEGATIVE MG/DL
LEUKOCYTE ESTERASE UR QL STRIP.AUTO: ABNORMAL
NITRITE UR QL STRIP.AUTO: NEGATIVE
PH UR STRIP: 5.5 [PH] (ref 4.6–8)
PH UR STRIP: 5.5 [PH] (ref 5–8)
PROT UR QL STRIP: NEGATIVE MG/DL
PROT UR STRIP-MCNC: NEGATIVE MG/DL
RBC #/AREA URNS HPF: ABNORMAL /HPF (ref 0–5)
SP GR UR REFRACTOMETRY: 1.01 (ref 1–1.03)
SP GR UR STRIP: 1.01 (ref 1–1.03)
UA UROBILINOGEN AMB POC: NORMAL (ref 0.2–1)
URINALYSIS CLARITY POC: NORMAL
URINALYSIS COLOR POC: YELLOW
URINE BLOOD POC: NORMAL
URINE LEUKOCYTES POC: NEGATIVE
URINE NITRITES POC: NEGATIVE
UROBILINOGEN UR QL STRIP.AUTO: 0.2 EU/DL (ref 0.2–1)
WBC URNS QL MICRO: ABNORMAL /HPF (ref 0–4)

## 2017-06-13 PROCEDURE — 81001 URINALYSIS AUTO W/SCOPE: CPT | Performed by: NURSE PRACTITIONER

## 2017-06-13 PROCEDURE — 87077 CULTURE AEROBIC IDENTIFY: CPT | Performed by: NURSE PRACTITIONER

## 2017-06-13 PROCEDURE — 87086 URINE CULTURE/COLONY COUNT: CPT | Performed by: NURSE PRACTITIONER

## 2017-06-13 RX ORDER — VERAPAMIL HYDROCHLORIDE 180 MG/1
TABLET, EXTENDED RELEASE ORAL
Refills: 2 | COMMUNITY
Start: 2017-03-16 | End: 2017-06-27 | Stop reason: ALTCHOICE

## 2017-06-13 NOTE — PROGRESS NOTES
The patient presents to the office today with the chief complaint of polymyalgia rheumatica    HPI    The patient remains on medications for polymyalgia rheumatica. She has mild aching but overall she is doing ok. The patient also remains on medications for hypertension and hyperlipidemia. The patient is tolerating the medications well. Review of Systems   Respiratory: Negative for shortness of breath. Cardiovascular: Negative for chest pain and leg swelling. Musculoskeletal: Positive for myalgias. Allergies   Allergen Reactions    Codeine Hives    Demerol [Meperidine] Nausea Only    Levaquin [Levofloxacin] Other (comments)     Swelling in feet and legs       Current Outpatient Prescriptions   Medication Sig Dispense Refill    verapamil ER (CALAN-SR) 180 mg CR tablet TK 1 T PO DAILY. STOP LOSARTAN  2    predniSONE (DELTASONE) 5 mg tablet 2 tablets by mouth daily (Patient taking differently: Take 3 tablets daily for 10 days then 2 tablets by mouth daily) 60 Tab 1    hydrALAZINE (APRESOLINE) 25 mg tablet TAKE 1 TABLET BY MOUTH TWICE DAILY. STOP VERAPAMIL 180 Tab 2    guanFACINE (TENEX) 1 mg tablet TAKE 1/2 TABLET BY MOUTH EVERY DAY. 30 Tab 0    peg 400-propylene glycol (SYSTANE, PROPYLENE GLYCOL,) 0.4-0.3 % drop Administer  to left eye as needed.  allopurinol (ZYLOPRIM) 100 mg tablet TAKE 1/2 TABLET BY MOUTH DAILY 30 Tab 0    GLUCOSAMINE HCL/CHONDR LEGER A NA (OSTEO BI-FLEX PO) Take  by mouth.  doxepin (SINEQUAN) 25 mg capsule Take 1 Cap by mouth nightly. 90 Cap 3    simvastatin (ZOCOR) 20 mg tablet Take 1 Tab by mouth nightly. 90 Tab 3    ranitidine (ZANTAC) 300 mg tablet Take 1 Tab by mouth daily. 90 Tab 3    levothyroxine (SYNTHROID) 100 mcg tablet Take 1 Tab by mouth nightly. 90 Tab 3    furosemide (LASIX) 20 mg tablet 1 tablet each AM 90 Tab 3    metoprolol tartrate (LOPRESSOR) 50 mg tablet Take 1 Tab by mouth two (2) times a day.  180 Tab 3    cyanocobalamin 1,000 mcg tablet Take 1,000 mcg by mouth daily.  aspirin delayed-release 81 mg tablet Take  by mouth daily.  folic acid 383 mcg tablet Take 800 mcg by mouth daily.  CALCIUM PHOSPHATE TRIB/VIT D3 (CITRACAL + D PO) Take  by mouth daily.  multivitamins-minerals-lutein (CENTRUM SILVER) tab tablet Take 1 Tab by mouth daily.  loratadine (CLARITIN) 10 mg tablet Take 10 mg by mouth daily.  tamoxifen (NOLVADEX) 20 mg tablet Take 20 mg by mouth daily.  clobetasol (OLUX) 0.05 % topical foam Apply  to affected area two (2) times daily as needed for Skin Irritation. use thin film on affected area         Past Medical History:   Diagnosis Date    Autoimmune disease ([address]     Breast cancer ([address] 2001, 1/ 2014    right, left    Cancer [facility]     Colon polyps     Elevated cholesterol     Enlarged lymph nodes     pericardium    GERD (gastroesophageal reflux disease)     Hypertension     Ill-defined condition 2007 to 2010    lung-ground glass followed by Dr. Juan Pablo Villar of breast, right 1976    benign    Mass of breast, right 1994    benign    Pure hyperglyceridemia     Thyroid disease        Past Surgical History:   Procedure Laterality Date    HX APPENDECTOMY      HX CATARACT REMOVAL Bilateral 2010    HX CHOLECYSTECTOMY      HX HEMORRHOIDECTOMY      HX HERNIA REPAIR  2004    small abdominal repair and fistula    HX HYSTERECTOMY  1991    cystocele    HX LYMPHADENECTOMY Right 7/2001    right axilla    HX MASTECTOMY Right 2001    with reconstruction    HX MASTECTOMY Left 1/14    HX POLYPECTOMY      [id number] muliple removals    HX RECTOCELE REPAIR  2003       Social History     Social History    Marital status:      Spouse name: N/A    Number of children: N/A    Years of education: N/A     Occupational History    Not on file.      Social History Main Topics    Smoking status: Former Smoker     Packs/day: 2.00     Years: 9.00     Quit date: 1/1/1963    Smokeless tobacco: Never Used    Alcohol use 0.5 oz/week     1 Standard drinks or equivalent per week      Comment: occasionally    Drug use: No    Sexual activity: Not Currently     Other Topics Concern    Not on file     Social History Narrative       Patient does have an advanced directive on file    Visit Vitals    /72 (BP 1 Location: Left arm, BP Patient Position: Sitting)    Pulse 66    Temp 98.9 °F (37.2 °C) (Tympanic)    Resp 16    Ht 5' 1\" (1.549 m)    Wt 143 lb (64.9 kg)    SpO2 96%    BMI 27.02 kg/m2       Physical Exam   No Cervical Lymphadenopathy  No Supraclavicular Lymphadenopathy  Thyroid is Normal  Lungs are clear to ausculation and percussion  Heart:  S1 S2 are normal, No gallops, No mummers  No Carotid Bruits  Abdomen:  Normal Bowel Sounds. No tenderness. No masses. No Hepatomegaly or Splenomegly  LE:  Strong Pedal Pulses. No Edema      BMI:  [facility] Outpatient Visit on 06/06/2017   Component Date Value Ref Range Status    WBC 06/06/2017 9.3  4.6 - 13.2 K/uL Final    RBC 06/06/2017 3.64* 4.20 - 5.30 M/uL Final    HGB 06/06/2017 11.1* 12.0 - 16.0 g/dL Final    HCT 06/06/2017 34.4* 35.0 - 45.0 % Final    MCV 06/06/2017 94.5  74.0 - 97.0 FL Final    MCH 06/06/2017 30.5  24.0 - 34.0 PG Final    MCHC 06/06/2017 32.3  31.0 - 37.0 g/dL Final    RDW 06/06/2017 13.8  11.6 - 14.5 % Final    PLATELET 72/18/8065 400  135 - 420 K/uL Final    MPV 06/06/2017 9.2  9.2 - 11.8 FL Final    NEUTROPHILS 06/06/2017 87* 40 - 73 % Final    LYMPHOCYTES 06/06/2017 8* 21 - 52 % Final    MONOCYTES 06/06/2017 3  3 - 10 % Final    EOSINOPHILS 06/06/2017 1  0 - 5 % Final    BASOPHILS 06/06/2017 1  0 - 2 % Final    ABS. NEUTROPHILS 06/06/2017 8.1* 1.8 - 8.0 K/UL Final    ABS. LYMPHOCYTES 06/06/2017 0.7* 0.9 - 3.6 K/UL Final    ABS. MONOCYTES 06/06/2017 0.3  0.05 - 1.2 K/UL Final    ABS. EOSINOPHILS 06/06/2017 0.1  0.0 - 0.4 K/UL Final    ABS.  BASOPHILS 06/06/2017 0.1* 0.0 - 0.06 K/UL Final    DF 06/06/2017 AUTOMATED    Final    Sodium 06/06/2017 133* 136 - 145 mmol/L Final    Potassium 06/06/2017 4.3  3.5 - 5.5 mmol/L Final    Chloride 06/06/2017 96* 100 - 108 mmol/L Final    CO2 06/06/2017 27  21 - 32 mmol/L Final    Anion gap 06/06/2017 10  3.0 - 18 mmol/L Final    Glucose 06/06/2017 120* 74 - 99 mg/dL Final    BUN 06/06/2017 33* 7.0 - 18 MG/DL Final    Creatinine 06/06/2017 1.37* 0.6 - 1.3 MG/DL Final    BUN/Creatinine ratio 06/06/2017 24* 12 - 20   Final    GFR est AA 06/06/2017 45* >60 ml/min/1.73m2 Final    GFR est non-AA 06/06/2017 37* >60 ml/min/1.73m2 Final    Comment: (NOTE)  Estimated GFR is calculated using the Modification of Diet in Renal   Disease (MDRD) Study equation, reported for both  Americans   (GFRAA) and non- Americans (GFRNA), and normalized to 1.73m2   body surface area. The physician must decide which value applies to   the patient. The MDRD study equation should only be used in   individuals age 25 or older. It has not been validated for the   following: pregnant women, patients with serious comorbid conditions,   or on certain medications, or persons with extremes of body size,   muscle mass, or nutritional status.  Calcium 06/06/2017 9.6  8.5 - 10.1 MG/DL Final    Bilirubin, total 06/06/2017 0.4  0.2 - 1.0 MG/DL Final    ALT (SGPT) 06/06/2017 93* 13 - 56 U/L Final    AST (SGOT) 06/06/2017 30  15 - 37 U/L Final    Alk.  phosphatase 06/06/2017 49  45 - 117 U/L Final    Protein, total 06/06/2017 7.3  6.4 - 8.2 g/dL Final    Albumin 06/06/2017 3.4  3.4 - 5.0 g/dL Final    Globulin 06/06/2017 3.9  2.0 - 4.0 g/dL Final    A-G Ratio 06/06/2017 0.9  0.8 - 1.7   Final    C-Reactive protein 06/06/2017 0.5* 0 - 0.3 mg/dL Final    Sed rate, automated 06/06/2017 57* 0 - 30 mm/hr Final   Hospital Outpatient Visit on 04/25/2017   Component Date Value Ref Range Status    Special Requests: 04/25/2017 NO SPECIAL REQUESTS Final    Culture result: 04/25/2017 NO GROWTH 2 DAYS    Final   Office Visit on 04/25/2017   Component Date Value Ref Range Status    Color (UA POC) 04/25/2017 Yellow   Final    Clarity (UA POC) 04/25/2017 Clear   Final    Glucose (UA POC) 04/25/2017 Negative  Negative Final    Bilirubin (UA POC) 04/25/2017 Negative  Negative Final    Ketones (UA POC) 04/25/2017 Negative  Negative Final    Specific gravity (UA POC) 04/25/2017 1.010  1.001 - 1.035 Final    Blood (UA POC) 04/25/2017 Negative  Negative Final    pH (UA POC) 04/25/2017 5.5  4.6 - 8.0 Final    Protein (UA POC) 04/25/2017 Negative  Negative mg/dL Final    Urobilinogen (UA POC) 04/25/2017 0.2 mg/dL  0.2 - 1 Final    Nitrites (UA POC) 04/25/2017 Negative  Negative Final    Leukocyte esterase (UA POC) 04/25/2017 1+  Negative Final   Hospital Outpatient Visit on 04/12/2017   Component Date Value Ref Range Status    Sodium 04/12/2017 140  136 - 145 mmol/L Final    Potassium 04/12/2017 4.2  3.5 - 5.5 mmol/L Final    Chloride 04/12/2017 101  100 - 108 mmol/L Final    CO2 04/12/2017 29  21 - 32 mmol/L Final    Anion gap 04/12/2017 10  3.0 - 18 mmol/L Final    Glucose 04/12/2017 83  74 - 99 mg/dL Final    BUN 04/12/2017 39* 7.0 - 18 MG/DL Final    Creatinine 04/12/2017 1.54* 0.6 - 1.3 MG/DL Final    BUN/Creatinine ratio 04/12/2017 25* 12 - 20   Final    GFR est AA 04/12/2017 39* >60 ml/min/1.73m2 Final    GFR est non-AA 04/12/2017 32* >60 ml/min/1.73m2 Final    Comment: (NOTE)  Estimated GFR is calculated using the Modification of Diet in Renal   Disease (MDRD) Study equation, reported for both  Americans   (GFRAA) and non- Americans (GFRNA), and normalized to 1.73m2   body surface area. The physician must decide which value applies to   the patient. The MDRD study equation should only be used in   individuals age 25 or older.  It has not been validated for the   following: pregnant women, patients with serious comorbid conditions,   or on certain medications, or persons with extremes of body size,   muscle mass, or nutritional status.  Calcium 04/12/2017 9.4  8.5 - 10.1 MG/DL Final    Bilirubin, total 04/12/2017 0.4  0.2 - 1.0 MG/DL Final    ALT (SGPT) 04/12/2017 34  13 - 56 U/L Final    AST (SGOT) 04/12/2017 21  15 - 37 U/L Final    Alk. phosphatase 04/12/2017 46  45 - 117 U/L Final    Protein, total 04/12/2017 6.8  6.4 - 8.2 g/dL Final    Albumin 04/12/2017 3.6  3.4 - 5.0 g/dL Final    Globulin 04/12/2017 3.2  2.0 - 4.0 g/dL Final    A-G Ratio 04/12/2017 1.1  0.8 - 1.7   Final    WBC 04/12/2017 7.7  4.6 - 13.2 K/uL Final    RBC 04/12/2017 3.61* 4.20 - 5.30 M/uL Final    HGB 04/12/2017 11.3* 12.0 - 16.0 g/dL Final    HCT 04/12/2017 36.9  35.0 - 45.0 % Final    MCV 04/12/2017 102.2* 74.0 - 97.0 FL Final    MCH 04/12/2017 31.3  24.0 - 34.0 PG Final    MCHC 04/12/2017 30.6* 31.0 - 37.0 g/dL Final    RDW 04/12/2017 14.7* 11.6 - 14.5 % Final    PLATELET 69/04/3063 369  135 - 420 K/uL Final    MPV 04/12/2017 9.2  9.2 - 11.8 FL Final    NEUTROPHILS 04/12/2017 59  40 - 73 % Final    LYMPHOCYTES 04/12/2017 27  21 - 52 % Final    MONOCYTES 04/12/2017 10  3 - 10 % Final    EOSINOPHILS 04/12/2017 3  0 - 5 % Final    BASOPHILS 04/12/2017 1  0 - 2 % Final    ABS. NEUTROPHILS 04/12/2017 4.5  1.8 - 8.0 K/UL Final    ABS. LYMPHOCYTES 04/12/2017 2.1  0.9 - 3.6 K/UL Final    ABS. MONOCYTES 04/12/2017 0.8  0.05 - 1.2 K/UL Final    ABS. EOSINOPHILS 04/12/2017 0.2  0.0 - 0.4 K/UL Final    ABS. BASOPHILS 04/12/2017 0.1* 0.0 - 0.06 K/UL Final    DF 04/12/2017 AUTOMATED    Final    Sed rate, automated 04/12/2017 33* 0 - 30 mm/hr Final    LIPID PROFILE 04/12/2017        Final    Cholesterol, total 04/12/2017 159  <200 MG/DL Final    Triglyceride 04/12/2017 119  <150 MG/DL Final    Comment: The drugs N-acetylcysteine (NAC) and  Metamiszole have been found to cause falsely  low results in this chemical assay.  Please  be sure to submit blood samples obtained  BEFORE administration of either of these  drugs to assure correct results.  HDL Cholesterol 04/12/2017 83* 40 - 60 MG/DL Final    LDL, calculated 04/12/2017 52.2  0 - 100 MG/DL Final    VLDL, calculated 04/12/2017 23.8  MG/DL Final    CHOL/HDL Ratio 04/12/2017 1.9  0 - 5.0   Final   Office Visit on 03/15/2017   Component Date Value Ref Range Status    Color (UA POC) 03/15/2017 Yellow   Final    Clarity (UA POC) 03/15/2017 Clear   Final    Glucose (UA POC) 03/15/2017 Negative  Negative Final    Bilirubin (UA POC) 03/15/2017 Negative  Negative Final    Ketones (UA POC) 03/15/2017 Negative  Negative Final    Specific gravity (UA POC) 03/15/2017 1.010  1.001 - 1.035 Final    Blood (UA POC) 03/15/2017 Negative  Negative Final    pH (UA POC) 03/15/2017 5.0  4.6 - 8.0 Final    Protein (UA POC) 03/15/2017 Negative  Negative mg/dL Final    Urobilinogen (UA POC) 03/15/2017 0.2 mg/dL  0.2 - 1 Final    Nitrites (UA POC) 03/15/2017 Negative  Negative Final    Leukocyte esterase (UA POC) 03/15/2017 Negative  Negative Final       .  Results for orders placed or performed during the hospital encounter of 06/06/17   CBC WITH AUTOMATED DIFF   Result Value Ref Range    WBC 9.3 4.6 - 13.2 K/uL    RBC 3.64 (L) 4.20 - 5.30 M/uL    HGB 11.1 (L) 12.0 - 16.0 g/dL    HCT 34.4 (L) 35.0 - 45.0 %    MCV 94.5 74.0 - 97.0 FL    MCH 30.5 24.0 - 34.0 PG    MCHC 32.3 31.0 - 37.0 g/dL    RDW 13.8 11.6 - 14.5 %    PLATELET 474 074 - 755 K/uL    MPV 9.2 9.2 - 11.8 FL    NEUTROPHILS 87 (H) 40 - 73 %    LYMPHOCYTES 8 (L) 21 - 52 %    MONOCYTES 3 3 - 10 %    EOSINOPHILS 1 0 - 5 %    BASOPHILS 1 0 - 2 %    ABS. NEUTROPHILS 8.1 (H) 1.8 - 8.0 K/UL    ABS. LYMPHOCYTES 0.7 (L) 0.9 - 3.6 K/UL    ABS. MONOCYTES 0.3 0.05 - 1.2 K/UL    ABS. EOSINOPHILS 0.1 0.0 - 0.4 K/UL    ABS.  BASOPHILS 0.1 (H) 0.0 - 0.06 K/UL    DF AUTOMATED     METABOLIC PANEL, COMPREHENSIVE   Result Value Ref Range    Sodium 133 (L) 136 - 145 mmol/L    Potassium 4.3 3.5 - 5.5 mmol/L    Chloride 96 (L) 100 - 108 mmol/L    CO2 27 21 - 32 mmol/L    Anion gap 10 3.0 - 18 mmol/L    Glucose 120 (H) 74 - 99 mg/dL    BUN 33 (H) 7.0 - 18 MG/DL    Creatinine 1.37 (H) 0.6 - 1.3 MG/DL    BUN/Creatinine ratio 24 (H) 12 - 20      GFR est AA 45 (L) >60 ml/min/1.73m2    GFR est non-AA 37 (L) >60 ml/min/1.73m2    Calcium 9.6 8.5 - 10.1 MG/DL    Bilirubin, total 0.4 0.2 - 1.0 MG/DL    ALT (SGPT) 93 (H) 13 - 56 U/L    AST (SGOT) 30 15 - 37 U/L    Alk. phosphatase 49 45 - 117 U/L    Protein, total 7.3 6.4 - 8.2 g/dL    Albumin 3.4 3.4 - 5.0 g/dL    Globulin 3.9 2.0 - 4.0 g/dL    A-G Ratio 0.9 0.8 - 1.7     C REACTIVE PROTEIN, QT   Result Value Ref Range    C-Reactive protein 0.5 (H) 0 - 0.3 mg/dL   SED RATE (ESR)   Result Value Ref Range    Sed rate, automated 57 (H) 0 - 30 mm/hr       Assessment / Plan      ICD-10-CM ICD-9-CM    1. Essential hypertension I10 401.9 CBC WITH AUTOMATED DIFF      METABOLIC PANEL, COMPREHENSIVE      C REACTIVE PROTEIN, QT      SED RATE (ESR)   2. PMR (polymyalgia rheumatica) ([facility]) M35.3 725 CBC WITH AUTOMATED DIFF      METABOLIC PANEL, COMPREHENSIVE      C REACTIVE PROTEIN, QT      SED RATE (ESR)   3. Pure hypercholesterolemia E78.00 272.0      Labs  she was advised to continue her maintenance medications  Further plans will be based on the lab results. Follow-up Disposition:  Return in about 3 weeks (around 6/27/2017). I asked Allison Graff if she has any questions and I answered the questions. Ermias Graff states that she understands the treatment plan and agrees with the treatment plan

## 2017-06-14 LAB
BACTERIA SPEC CULT: ABNORMAL
SERVICE CMNT-IMP: ABNORMAL

## 2017-06-16 DIAGNOSIS — R70.0 ELEVATED SED RATE: ICD-10-CM

## 2017-06-16 DIAGNOSIS — R31.29 MICROSCOPIC HEMATURIA: Primary | ICD-10-CM

## 2017-06-16 RX ORDER — GUANFACINE HYDROCHLORIDE 1 MG/1
TABLET ORAL
Qty: 30 TAB | Refills: 0 | Status: SHIPPED | OUTPATIENT
Start: 2017-06-16 | End: 2017-06-21 | Stop reason: SDUPTHER

## 2017-06-21 RX ORDER — GUANFACINE HYDROCHLORIDE 1 MG/1
TABLET ORAL
Qty: 45 TAB | Refills: 3 | Status: SHIPPED | OUTPATIENT
Start: 2017-06-21 | End: 2018-01-23 | Stop reason: SDUPTHER

## 2017-06-21 NOTE — TELEPHONE ENCOUNTER
Requested Prescriptions     Pending Prescriptions Disp Refills    guanFACINE IR (TENEX) 1 mg IR tablet 45 Tab 3     Sig: TAKE 1/2 TABLET BY MOUTH EVERY DAY.

## 2017-06-27 ENCOUNTER — OFFICE VISIT (OUTPATIENT)
Dept: INTERNAL MEDICINE CLINIC | Age: 82
End: 2017-06-27

## 2017-06-27 VITALS
OXYGEN SATURATION: 96 % | HEIGHT: 61 IN | HEART RATE: 61 BPM | DIASTOLIC BLOOD PRESSURE: 76 MMHG | WEIGHT: 140 LBS | BODY MASS INDEX: 26.43 KG/M2 | SYSTOLIC BLOOD PRESSURE: 158 MMHG | RESPIRATION RATE: 16 BRPM | TEMPERATURE: 98.1 F

## 2017-06-27 DIAGNOSIS — M10.00 IDIOPATHIC GOUT, UNSPECIFIED CHRONICITY, UNSPECIFIED SITE: ICD-10-CM

## 2017-06-27 DIAGNOSIS — I10 ESSENTIAL HYPERTENSION: Primary | ICD-10-CM

## 2017-06-27 DIAGNOSIS — E78.00 PURE HYPERCHOLESTEROLEMIA: ICD-10-CM

## 2017-06-27 DIAGNOSIS — M35.3 PMR (POLYMYALGIA RHEUMATICA) (HCC): ICD-10-CM

## 2017-06-27 RX ORDER — COLCHICINE 0.6 MG/1
0.6 TABLET ORAL DAILY
COMMUNITY
End: 2017-08-08 | Stop reason: ALTCHOICE

## 2017-06-27 NOTE — PROGRESS NOTES
1. Have you been to the ER, urgent care clinic since your last visit? Hospitalized since your last visit? No    2. Have you seen or consulted any other health care providers outside of the [address] [place] [address] since your last visit? Include any pap smears or colon screening.  No

## 2017-06-27 NOTE — MR AVS SNAPSHOT
Visit Information Date & Time Provider Department Dept. Phone Encounter #  
 6/27/2017  9:45 AM Sean Craven MD [facility] INTERNAL MEDICINE OF Zach Figueroa [phone] [id number] Follow-up Instructions Return in about 6 weeks (around 8/8/2017). Your Appointments 8/7/2017 10:45 AM  
Follow Up with Sean Craven MD  
[address]) Appt Note: 6wk  
 [address] [facility], [address] [facility] [address]  
  
   
 [address] [facility], [address] [facility] [address]  
  
    
 9/6/2017 12:30 PM  
Follow Up with Sean Craven MD  
[address]) Appt Note: 5 mo f/u  
 [address] [facility], [address] [facility] [address]  
[phone] Upcoming Health Maintenance Date Due  
 MEDICARE YEARLY EXAM 8/23/1997 INFLUENZA AGE 9 TO ADULT 8/1/2017 GLAUCOMA SCREENING Q2Y 3/1/2018 DTaP/Tdap/Td series (2 - Td) 10/20/2026 Allergies as of 6/27/2017  Review Complete On: 6/27/2017 By: Sean Craven MD  
  
 Severity Noted Reaction Type Reactions Codeine  08/04/2014    Hives Demerol [Meperidine]  08/04/2014    Nausea Only Levaquin [Levofloxacin]  08/04/2014    Other (comments) Swelling in feet and legs Current Immunizations  Reviewed on 6/26/2017 Name Date Influenza High Dose Vaccine PF 10/13/2016  8:53 AM  
 Influenza Vaccine 10/6/2015 Pneumococcal Conjugate (PCV-13) 10/6/2015 Pneumococcal Polysaccharide (PPSV-23) 4/9/2010, 4/11/2003, 9/9/1993 Td 7/30/2008, 9/9/1993 Tdap 10/20/2016  8:40 AM  
 Zoster Vaccine, Live 1/14/2009 Not reviewed this visit You Were Diagnosed With   
  
 Codes Comments Essential hypertension    -  Primary ICD-10-CM: I10 
ICD-9-CM: 401.9 Idiopathic gout, unspecified chronicity, unspecified site     ICD-10-CM: M10.00 ICD-9-CM: 274.9 Vitals BP Pulse Temp Resp Height(growth percentile) 158/76 (BP 1 Location: Left arm, BP Patient Position: Sitting) 61 98.1 °F (36.7 °C) (Tympanic) 16 5' 1\" (1.549 m) Weight(growth percentile) SpO2 BMI OB Status Smoking Status 140 lb (63.5 kg) 96% 26.45 kg/m2 Hysterectomy Former Smoker BMI and BSA Data Body Mass Index Body Surface Area  
 26.45 kg/m 2 1.65 m 2 Preferred Pharmacy Pharmacy Name Phone [address] [facility], [address] [place] [address] [address] Your Updated Medication List  
  
   
This list is accurate as of: 6/27/17 11:47 AM.  Always use your most recent med list.  
  
  
  
  
 allopurinol 100 mg tablet Commonly known as:  ZYLOPRIM  
TAKE 1/2 TABLET BY MOUTH DAILY  
  
 aspirin delayed-release 81 mg tablet Take  by mouth daily. CENTRUM SILVER Tab tablet Generic drug:  multivitamins-minerals-lutein Take 1 Tab by mouth daily. CITRACAL + D PO Take  by mouth daily. clobetasol 0.05 % topical foam  
Commonly known as:  OLUX Apply  to affected area two (2) times daily as needed for Skin Irritation. use thin film on affected area  
  
 colchicine 0.6 mg tablet Take 0.6 mg by mouth daily. cyanocobalamin 1,000 mcg tablet Take 1,000 mcg by mouth daily. doxepin 25 mg capsule Commonly known as:  SINEquan Take 1 Cap by mouth nightly. folic acid 707 mcg tablet Take 800 mcg by mouth daily. furosemide 20 mg tablet Commonly known as:  LASIX  
1 tablet each AM  
  
 guanFACINE IR 1 mg IR tablet Commonly known as:  TENEX  
TAKE 1/2 TABLET BY MOUTH EVERY DAY. hydrALAZINE 25 mg tablet Commonly known as:  APRESOLINE  
TAKE 1 TABLET BY MOUTH TWICE DAILY. STOP VERAPAMIL  
  
 levothyroxine 100 mcg tablet Commonly known as:  SYNTHROID Take 1 Tab by mouth nightly. loratadine 10 mg tablet Commonly known as:  Carmita Lobo Take 10 mg by mouth daily. metoprolol tartrate 50 mg tablet Commonly known as:  LOPRESSOR  
 Take 1 Tab by mouth two (2) times a day. OSTEO BI-FLEX PO Take  by mouth.  
  
 predniSONE 5 mg tablet Commonly known as:  DELTASONE  
2 tablets by mouth daily  
  
 raNITIdine 300 mg tablet Commonly known as:  ZANTAC Take 1 Tab by mouth daily. simvastatin 20 mg tablet Commonly known as:  ZOCOR Take 1 Tab by mouth nightly. SYSTANE (PROPYLENE GLYCOL) 0.4-0.3 % Drop Generic drug:  peg 400-propylene glycol Administer  to left eye as needed. tamoxifen 20 mg tablet Commonly known as:  NOLVADEX Take 20 mg by mouth daily. Follow-up Instructions Return in about 6 weeks (around 8/8/2017). To-Do List   
 06/30/2017 10:45 AM  
  Appointment with [facility] 2 at [address] [place] [address] ([phone]) OUTSIDE FILMS  - Any outside films related to the study being scheduled should be brought with you on the day of the exam.  If this cannot be done there may be a delay in the reading of the study. MEDICATIONS  - Patient must bring a complete list of all medications currently taking to include prescriptions, over-the-counter meds, herbals, vitamins & any dietary supplements  GENERAL -Patient must drink 32 ounces of water 1 hour prior to the exam. -Patient must arrive with a full bladder. Patient Instructions Health Maintenance Due Topic  MEDICARE YEARLY EXAM   
 
 
 
  
Introducing [employer]! Lucina Villarreal introduces [employer] patient portal. Now you can access parts of your medical record, email your doctor's office, and request medication refills online. 1. In your internet browser, go to [email or web address] 2. Click on the First Time User? Click Here link in the Sign In box. You will see the New Member Sign Up page. 3. Enter your [employer] Access Code exactly as it appears below. You will not need to use this code after youve completed the sign-up process.  If you do not sign up before the expiration date, you must request a new code. · [employer] Access Code: [email or web address] Expires: 7/19/2017 10:42 AM 
 
4. Enter the last four digits of your Social Security Number (xxxx) and Date of Birth (mm/dd/yyyy) as indicated and click Submit. You will be taken to the next sign-up page. 5. Create a [employer] ID. This will be your [employer] login ID and cannot be changed, so think of one that is secure and easy to remember. 6. Create a [employer] password. You can change your password at any time. 7. Enter your Password Reset Question and Answer. This can be used at a later time if you forget your password. 8. Enter your e-mail address. You will receive e-mail notification when new information is available in [address] 9. Click Sign Up. You can now view and download portions of your medical record. 10. Click the Download Summary menu link to download a portable copy of your medical information. If you have questions, please visit the Frequently Asked Questions section of the [employer] website. Remember, [employer] is NOT to be used for urgent needs. For medical emergencies, dial 911. Now available from your iPhone and Android! Please provide this summary of care documentation to your next provider. Your primary care clinician is listed as Christophe Gambino. If you have any questions after today's visit, please call [phone].

## 2017-06-28 PROBLEM — M35.3 PMR (POLYMYALGIA RHEUMATICA) (HCC): Status: ACTIVE | Noted: 2017-06-28

## 2017-06-29 NOTE — PROGRESS NOTES
The patient presents to the office today with the chief complaint of aching    HPI    The patient remains on medications for PMR. she is doing some better but she persists with some myalgias. The patient remains on medications for hypertension. The patient is tolerating the medications well. The patient has has had attacks of gout. Currently the problem is quiet      Review of Systems   Respiratory: Negative for shortness of breath. Cardiovascular: Negative for chest pain and leg swelling. Musculoskeletal: Positive for myalgias. Allergies   Allergen Reactions    Codeine Hives    Demerol [Meperidine] Nausea Only    Levaquin [Levofloxacin] Other (comments)     Swelling in feet and legs       Current Outpatient Prescriptions   Medication Sig Dispense Refill    colchicine 0.6 mg tablet Take 0.6 mg by mouth daily.  guanFACINE IR (TENEX) 1 mg IR tablet TAKE 1/2 TABLET BY MOUTH EVERY DAY. 45 Tab 3    predniSONE (DELTASONE) 5 mg tablet 2 tablets by mouth daily (Patient taking differently: Take 3 tablets daily for 10 days then 2 tablets by mouth daily) 60 Tab 1    hydrALAZINE (APRESOLINE) 25 mg tablet TAKE 1 TABLET BY MOUTH TWICE DAILY. STOP VERAPAMIL 180 Tab 2    peg 400-propylene glycol (SYSTANE, PROPYLENE GLYCOL,) 0.4-0.3 % drop Administer  to left eye as needed.  allopurinol (ZYLOPRIM) 100 mg tablet TAKE 1/2 TABLET BY MOUTH DAILY 30 Tab 0    GLUCOSAMINE HCL/CHONDR LEGER A NA (OSTEO BI-FLEX PO) Take  by mouth.  doxepin (SINEQUAN) 25 mg capsule Take 1 Cap by mouth nightly. 90 Cap 3    simvastatin (ZOCOR) 20 mg tablet Take 1 Tab by mouth nightly. 90 Tab 3    ranitidine (ZANTAC) 300 mg tablet Take 1 Tab by mouth daily. 90 Tab 3    levothyroxine (SYNTHROID) 100 mcg tablet Take 1 Tab by mouth nightly. 90 Tab 3    furosemide (LASIX) 20 mg tablet 1 tablet each AM 90 Tab 3    metoprolol tartrate (LOPRESSOR) 50 mg tablet Take 1 Tab by mouth two (2) times a day.  180 Tab 3    cyanocobalamin 1,000 mcg tablet Take 1,000 mcg by mouth daily.  aspirin delayed-release 81 mg tablet Take  by mouth daily.  folic acid 544 mcg tablet Take 800 mcg by mouth daily.  CALCIUM PHOSPHATE TRIB/VIT D3 (CITRACAL + D PO) Take  by mouth daily.  multivitamins-minerals-lutein (CENTRUM SILVER) tab tablet Take 1 Tab by mouth daily.  loratadine (CLARITIN) 10 mg tablet Take 10 mg by mouth daily.  tamoxifen (NOLVADEX) 20 mg tablet Take 20 mg by mouth daily.  clobetasol (OLUX) 0.05 % topical foam Apply  to affected area two (2) times daily as needed for Skin Irritation. use thin film on affected area         Past Medical History:   Diagnosis Date    Autoimmune disease ([address]     Breast cancer ([address] 2001, 1/ 2014    right, left    [facility]     Colon polyps     Elevated cholesterol     Enlarged lymph nodes     pericardium    GERD (gastroesophageal reflux disease)     Hypertension     Ill-defined condition 2007 to 2010    lung-ground glass followed by Dr. Darius Brown of breast, right 1976    benign    Mass of breast, right 1994    benign    Pure hyperglyceridemia     Thyroid disease        Past Surgical History:   Procedure Laterality Date    HX APPENDECTOMY      HX CATARACT REMOVAL Bilateral 2010    HX CHOLECYSTECTOMY      HX HEMORRHOIDECTOMY      HX HERNIA REPAIR  2004    small abdominal repair and fistula    HX HYSTERECTOMY  1991    cystocele    HX LYMPHADENECTOMY Right 7/2001    right axilla    HX MASTECTOMY Right 2001    with reconstruction    HX MASTECTOMY Left 1/14    HX POLYPECTOMY      [id number] muliple removals    HX RECTOCELE REPAIR  2003       Social History     Social History    Marital status:      Spouse name: N/A    Number of children: N/A    Years of education: N/A     Occupational History    Not on file.      Social History Main Topics    Smoking status: Former Smoker     Packs/day: 2.00 Years: 9.00     Quit date: 1/1/1963    Smokeless tobacco: Never Used    Alcohol use 0.5 oz/week     1 Standard drinks or equivalent per week      Comment: occasionally    Drug use: No    Sexual activity: Not Currently     Other Topics Concern    Not on file     Social History Narrative       Patient does have an advanced directive on file    Visit Vitals    /76 (BP 1 Location: Left arm, BP Patient Position: Sitting)    Pulse 61    Temp 98.1 °F (36.7 °C) (Tympanic)    Resp 16    Ht 5' 1\" (1.549 m)    Wt 140 lb (63.5 kg)    SpO2 96%    BMI 26.45 kg/m2       Physical Exam   No Cervical Lymphadenopathy  No Supraclavicular Lymphadenopathy  Thyroid is Normal  Lungs are clear to ausculation and percussion  Heart:  S1 S2 are normal, No gallops, No mummers  No Carotid Bruits  Abdomen:  Normal Bowel Sounds. No tenderness. No masses. No Hepatomegaly or Splenomegly  LE:  Strong Pedal Pulses.   No Edema      BMI:  [facility] Outpatient Visit on 06/13/2017   Component Date Value Ref Range Status    Color 06/13/2017 YELLOW    Final    Appearance 06/13/2017 CLEAR    Final    Specific gravity 06/13/2017 1.015  1.005 - 1.030   Final    pH (UA) 06/13/2017 5.5  5.0 - 8.0   Final    Protein 06/13/2017 NEGATIVE   NEG mg/dL Final    Glucose 06/13/2017 NEGATIVE   NEG mg/dL Final    Ketone 06/13/2017 NEGATIVE   NEG mg/dL Final    Bilirubin 06/13/2017 NEGATIVE   NEG   Final    Blood 06/13/2017 TRACE* NEG   Final    Urobilinogen 06/13/2017 0.2  0.2 - 1.0 EU/dL Final    Nitrites 06/13/2017 NEGATIVE   NEG   Final    Leukocyte Esterase 06/13/2017 TRACE* NEG   Final    WBC 06/13/2017 2 to 3  0 - 4 /hpf Final    RBC 06/13/2017 8 to 10  0 - 5 /hpf Final    Epithelial cells 06/13/2017 FEW  0 - 5 /lpf Final    Bacteria 06/13/2017 NEGATIVE   NEG /hpf Final    Special Requests: 06/13/2017 NO SPECIAL REQUESTS    Final    Culture result: 06/13/2017 *   Final Value:[id number]  COLONIES/mL  STREPTOCOCCI, BETA HEMOLYTIC GROUP B     Lab Only on 06/13/2017   Component Date Value Ref Range Status    Color (UA POC) 06/13/2017 Yellow   Final    Clarity (UA POC) 06/13/2017 Slightly Cloudy   Final    Glucose (UA POC) 06/13/2017 Negative  Negative Final    Bilirubin (UA POC) 06/13/2017 Negative  Negative Final    Ketones (UA POC) 06/13/2017 Negative  Negative Final    Specific gravity (UA POC) 06/13/2017 1.010  1.001 - 1.035 Final    Blood (UA POC) 06/13/2017 2+  Negative Final    pH (UA POC) 06/13/2017 5.5  4.6 - 8.0 Final    Protein (UA POC) 06/13/2017 Negative  Negative mg/dL Final    Urobilinogen (UA POC) 06/13/2017 0.2 mg/dL  0.2 - 1 Final    Nitrites (UA POC) 06/13/2017 Negative  Negative Final    Leukocyte esterase (UA POC) 06/13/2017 Negative  Negative Final   Hospital Outpatient Visit on 06/06/2017   Component Date Value Ref Range Status    WBC 06/06/2017 9.3  4.6 - 13.2 K/uL Final    RBC 06/06/2017 3.64* 4.20 - 5.30 M/uL Final    HGB 06/06/2017 11.1* 12.0 - 16.0 g/dL Final    HCT 06/06/2017 34.4* 35.0 - 45.0 % Final    MCV 06/06/2017 94.5  74.0 - 97.0 FL Final    MCH 06/06/2017 30.5  24.0 - 34.0 PG Final    MCHC 06/06/2017 32.3  31.0 - 37.0 g/dL Final    RDW 06/06/2017 13.8  11.6 - 14.5 % Final    PLATELET 38/74/4911 528  135 - 420 K/uL Final    MPV 06/06/2017 9.2  9.2 - 11.8 FL Final    NEUTROPHILS 06/06/2017 87* 40 - 73 % Final    LYMPHOCYTES 06/06/2017 8* 21 - 52 % Final    MONOCYTES 06/06/2017 3  3 - 10 % Final    EOSINOPHILS 06/06/2017 1  0 - 5 % Final    BASOPHILS 06/06/2017 1  0 - 2 % Final    ABS. NEUTROPHILS 06/06/2017 8.1* 1.8 - 8.0 K/UL Final    ABS. LYMPHOCYTES 06/06/2017 0.7* 0.9 - 3.6 K/UL Final    ABS. MONOCYTES 06/06/2017 0.3  0.05 - 1.2 K/UL Final    ABS. EOSINOPHILS 06/06/2017 0.1  0.0 - 0.4 K/UL Final    ABS.  BASOPHILS 06/06/2017 0.1* 0.0 - 0.06 K/UL Final    DF 06/06/2017 AUTOMATED    Final    Sodium 06/06/2017 133* 136 - 145 mmol/L Final    Potassium 06/06/2017 4.3  3.5 - 5.5 mmol/L Final    Chloride 06/06/2017 96* 100 - 108 mmol/L Final    CO2 06/06/2017 27  21 - 32 mmol/L Final    Anion gap 06/06/2017 10  3.0 - 18 mmol/L Final    Glucose 06/06/2017 120* 74 - 99 mg/dL Final    BUN 06/06/2017 33* 7.0 - 18 MG/DL Final    Creatinine 06/06/2017 1.37* 0.6 - 1.3 MG/DL Final    BUN/Creatinine ratio 06/06/2017 24* 12 - 20   Final    GFR est AA 06/06/2017 45* >60 ml/min/1.73m2 Final    GFR est non-AA 06/06/2017 37* >60 ml/min/1.73m2 Final    Comment: (NOTE)  Estimated GFR is calculated using the Modification of Diet in Renal   Disease (MDRD) Study equation, reported for both  Americans   (GFRAA) and non- Americans (GFRNA), and normalized to 1.73m2   body surface area. The physician must decide which value applies to   the patient. The MDRD study equation should only be used in   individuals age 25 or older. It has not been validated for the   following: pregnant women, patients with serious comorbid conditions,   or on certain medications, or persons with extremes of body size,   muscle mass, or nutritional status.  Calcium 06/06/2017 9.6  8.5 - 10.1 MG/DL Final    Bilirubin, total 06/06/2017 0.4  0.2 - 1.0 MG/DL Final    ALT (SGPT) 06/06/2017 93* 13 - 56 U/L Final    AST (SGOT) 06/06/2017 30  15 - 37 U/L Final    Alk.  phosphatase 06/06/2017 49  45 - 117 U/L Final    Protein, total 06/06/2017 7.3  6.4 - 8.2 g/dL Final    Albumin 06/06/2017 3.4  3.4 - 5.0 g/dL Final    Globulin 06/06/2017 3.9  2.0 - 4.0 g/dL Final    A-G Ratio 06/06/2017 0.9  0.8 - 1.7   Final    C-Reactive protein 06/06/2017 0.5* 0 - 0.3 mg/dL Final    Sed rate, automated 06/06/2017 57* 0 - 30 mm/hr Final   Hospital Outpatient Visit on 04/25/2017   Component Date Value Ref Range Status    Special Requests: 04/25/2017 NO SPECIAL REQUESTS    Final    Culture result: 04/25/2017 NO GROWTH 2 DAYS    Final   Office Visit on 04/25/2017 Component Date Value Ref Range Status    Color (UA POC) 04/25/2017 Yellow   Final    Clarity (UA POC) 04/25/2017 Clear   Final    Glucose (UA POC) 04/25/2017 Negative  Negative Final    Bilirubin (UA POC) 04/25/2017 Negative  Negative Final    Ketones (UA POC) 04/25/2017 Negative  Negative Final    Specific gravity (UA POC) 04/25/2017 1.010  1.001 - 1.035 Final    Blood (UA POC) 04/25/2017 Negative  Negative Final    pH (UA POC) 04/25/2017 5.5  4.6 - 8.0 Final    Protein (UA POC) 04/25/2017 Negative  Negative mg/dL Final    Urobilinogen (UA POC) 04/25/2017 0.2 mg/dL  0.2 - 1 Final    Nitrites (UA POC) 04/25/2017 Negative  Negative Final    Leukocyte esterase (UA POC) 04/25/2017 1+  Negative Final   Hospital Outpatient Visit on 04/12/2017   Component Date Value Ref Range Status    Sodium 04/12/2017 140  136 - 145 mmol/L Final    Potassium 04/12/2017 4.2  3.5 - 5.5 mmol/L Final    Chloride 04/12/2017 101  100 - 108 mmol/L Final    CO2 04/12/2017 29  21 - 32 mmol/L Final    Anion gap 04/12/2017 10  3.0 - 18 mmol/L Final    Glucose 04/12/2017 83  74 - 99 mg/dL Final    BUN 04/12/2017 39* 7.0 - 18 MG/DL Final    Creatinine 04/12/2017 1.54* 0.6 - 1.3 MG/DL Final    BUN/Creatinine ratio 04/12/2017 25* 12 - 20   Final    GFR est AA 04/12/2017 39* >60 ml/min/1.73m2 Final    GFR est non-AA 04/12/2017 32* >60 ml/min/1.73m2 Final    Comment: (NOTE)  Estimated GFR is calculated using the Modification of Diet in Renal   Disease (MDRD) Study equation, reported for both  Americans   (GFRAA) and non- Americans (GFRNA), and normalized to 1.73m2   body surface area. The physician must decide which value applies to   the patient. The MDRD study equation should only be used in   individuals age 25 or older.  It has not been validated for the   following: pregnant women, patients with serious comorbid conditions,   or on certain medications, or persons with extremes of body size,   muscle mass, or nutritional status.  Calcium 04/12/2017 9.4  8.5 - 10.1 MG/DL Final    Bilirubin, total 04/12/2017 0.4  0.2 - 1.0 MG/DL Final    ALT (SGPT) 04/12/2017 34  13 - 56 U/L Final    AST (SGOT) 04/12/2017 21  15 - 37 U/L Final    Alk. phosphatase 04/12/2017 46  45 - 117 U/L Final    Protein, total 04/12/2017 6.8  6.4 - 8.2 g/dL Final    Albumin 04/12/2017 3.6  3.4 - 5.0 g/dL Final    Globulin 04/12/2017 3.2  2.0 - 4.0 g/dL Final    A-G Ratio 04/12/2017 1.1  0.8 - 1.7   Final    WBC 04/12/2017 7.7  4.6 - 13.2 K/uL Final    RBC 04/12/2017 3.61* 4.20 - 5.30 M/uL Final    HGB 04/12/2017 11.3* 12.0 - 16.0 g/dL Final    HCT 04/12/2017 36.9  35.0 - 45.0 % Final    MCV 04/12/2017 102.2* 74.0 - 97.0 FL Final    MCH 04/12/2017 31.3  24.0 - 34.0 PG Final    MCHC 04/12/2017 30.6* 31.0 - 37.0 g/dL Final    RDW 04/12/2017 14.7* 11.6 - 14.5 % Final    PLATELET 44/71/2381 133  135 - 420 K/uL Final    MPV 04/12/2017 9.2  9.2 - 11.8 FL Final    NEUTROPHILS 04/12/2017 59  40 - 73 % Final    LYMPHOCYTES 04/12/2017 27  21 - 52 % Final    MONOCYTES 04/12/2017 10  3 - 10 % Final    EOSINOPHILS 04/12/2017 3  0 - 5 % Final    BASOPHILS 04/12/2017 1  0 - 2 % Final    ABS. NEUTROPHILS 04/12/2017 4.5  1.8 - 8.0 K/UL Final    ABS. LYMPHOCYTES 04/12/2017 2.1  0.9 - 3.6 K/UL Final    ABS. MONOCYTES 04/12/2017 0.8  0.05 - 1.2 K/UL Final    ABS. EOSINOPHILS 04/12/2017 0.2  0.0 - 0.4 K/UL Final    ABS. BASOPHILS 04/12/2017 0.1* 0.0 - 0.06 K/UL Final    DF 04/12/2017 AUTOMATED    Final    Sed rate, automated 04/12/2017 33* 0 - 30 mm/hr Final    LIPID PROFILE 04/12/2017        Final    Cholesterol, total 04/12/2017 159  <200 MG/DL Final    Triglyceride 04/12/2017 119  <150 MG/DL Final    Comment: The drugs N-acetylcysteine (NAC) and  Metamiszole have been found to cause falsely  low results in this chemical assay.  Please  be sure to submit blood samples obtained  BEFORE administration of either of these  drugs to assure correct results.  HDL Cholesterol 04/12/2017 83* 40 - 60 MG/DL Final    LDL, calculated 04/12/2017 52.2  0 - 100 MG/DL Final    VLDL, calculated 04/12/2017 23.8  MG/DL Final    CHOL/HDL Ratio 04/12/2017 1.9  0 - 5.0   Final       .No results found for any visits on 06/27/17. Assessment / Plan      ICD-10-CM ICD-9-CM    1. Essential hypertension I10 401.9    2. Idiopathic gout, unspecified chronicity, unspecified site M10.00 274.9    3. Pure hypercholesterolemia E78.00 272.0    4. PMR (polymyalgia rheumatica) ([facility]) M35.3 725      she was advised to continue her maintenance medications    Follow-up Disposition:  Return in about 6 weeks (around 8/8/2017). I asked Manjula Rausch Headings if she has any questions and I answered the questions. Joan Rausch Headings states that she understands the treatment plan and agrees with the treatment plan

## 2017-06-30 ENCOUNTER — HOSPITAL ENCOUNTER (OUTPATIENT)
Dept: ULTRASOUND IMAGING | Age: 82
Discharge: HOME | End: 2017-06-30
Attending: NURSE PRACTITIONER
Payer: MEDICARE

## 2017-06-30 DIAGNOSIS — R31.29 MICROSCOPIC HEMATURIA: ICD-10-CM

## 2017-06-30 PROCEDURE — 76770 US EXAM ABDO BACK WALL COMP: CPT

## 2017-06-30 RX ORDER — CIPROFLOXACIN 500 MG/1
500 TABLET ORAL 2 TIMES DAILY
Qty: 14 TAB | Refills: 0 | OUTPATIENT
Start: 2017-06-30 | End: 2017-07-07 | Stop reason: ALTCHOICE

## 2017-07-03 RX ORDER — NITROFURANTOIN 25; 75 MG/1; MG/1
100 CAPSULE ORAL 2 TIMES DAILY
Qty: 14 CAP | Refills: 0 | Status: SHIPPED | OUTPATIENT
Start: 2017-07-03 | End: 2017-07-10

## 2017-07-03 NOTE — TELEPHONE ENCOUNTER
Patient called with complains of swelling in legs on cipro. Made aware to discontinue and will send in new medication once Dr Tanisha Mosquera approves.

## 2017-07-06 RX ORDER — HYDRALAZINE HYDROCHLORIDE 25 MG/1
TABLET, FILM COATED ORAL
Qty: 180 TAB | Refills: 2 | Status: SHIPPED | OUTPATIENT
Start: 2017-07-06 | End: 2017-09-25

## 2017-07-06 NOTE — TELEPHONE ENCOUNTER
Requested Prescriptions     Pending Prescriptions Disp Refills    hydrALAZINE (APRESOLINE) 25 mg tablet 180 Tab 2

## 2017-07-07 ENCOUNTER — HOSPITAL ENCOUNTER (OUTPATIENT)
Dept: LAB | Age: 82
Discharge: HOME | End: 2017-07-07
Payer: MEDICARE

## 2017-07-07 ENCOUNTER — OFFICE VISIT (OUTPATIENT)
Dept: INTERNAL MEDICINE CLINIC | Age: 82
End: 2017-07-07

## 2017-07-07 VITALS
TEMPERATURE: 100.5 F | HEIGHT: 61 IN | OXYGEN SATURATION: 98 % | DIASTOLIC BLOOD PRESSURE: 76 MMHG | HEART RATE: 76 BPM | RESPIRATION RATE: 16 BRPM | SYSTOLIC BLOOD PRESSURE: 142 MMHG | BODY MASS INDEX: 27.19 KG/M2 | WEIGHT: 144 LBS

## 2017-07-07 DIAGNOSIS — R50.9 FEVER, UNSPECIFIED FEVER CAUSE: ICD-10-CM

## 2017-07-07 DIAGNOSIS — R60.9 EDEMA, UNSPECIFIED TYPE: ICD-10-CM

## 2017-07-07 DIAGNOSIS — I10 ESSENTIAL HYPERTENSION: Primary | ICD-10-CM

## 2017-07-07 LAB
ALBUMIN SERPL BCP-MCNC: 3.3 G/DL (ref 3.4–5)
ALBUMIN/GLOB SERPL: 0.9 {RATIO} (ref 0.8–1.7)
ALP SERPL-CCNC: 38 U/L (ref 45–117)
ALT SERPL-CCNC: 31 U/L (ref 13–56)
ANION GAP BLD CALC-SCNC: 9 MMOL/L (ref 3–18)
AST SERPL W P-5'-P-CCNC: 22 U/L (ref 15–37)
BASOPHILS # BLD AUTO: 0 K/UL (ref 0–0.06)
BASOPHILS # BLD: 0 % (ref 0–2)
BILIRUB SERPL-MCNC: 0.4 MG/DL (ref 0.2–1)
BILIRUB UR QL STRIP: NEGATIVE
BUN SERPL-MCNC: 41 MG/DL (ref 7–18)
BUN/CREAT SERPL: 32 (ref 12–20)
CALCIUM SERPL-MCNC: 8.7 MG/DL (ref 8.5–10.1)
CHLORIDE SERPL-SCNC: 99 MMOL/L (ref 100–108)
CO2 SERPL-SCNC: 27 MMOL/L (ref 21–32)
CREAT SERPL-MCNC: 1.3 MG/DL (ref 0.6–1.3)
DIFFERENTIAL METHOD BLD: ABNORMAL
EOSINOPHIL # BLD: 0.6 K/UL (ref 0–0.4)
EOSINOPHIL NFR BLD: 5 % (ref 0–5)
ERYTHROCYTE [DISTWIDTH] IN BLOOD BY AUTOMATED COUNT: 14.8 % (ref 11.6–14.5)
GLOBULIN SER CALC-MCNC: 3.5 G/DL (ref 2–4)
GLUCOSE SERPL-MCNC: 116 MG/DL (ref 74–99)
GLUCOSE UR-MCNC: NEGATIVE MG/DL
HCT VFR BLD AUTO: 34.8 % (ref 35–45)
HGB BLD-MCNC: 11.3 G/DL (ref 12–16)
KETONES P FAST UR STRIP-MCNC: NEGATIVE MG/DL
LYMPHOCYTES # BLD AUTO: 6 % (ref 21–52)
LYMPHOCYTES # BLD: 0.6 K/UL (ref 0.9–3.6)
MCH RBC QN AUTO: 30.4 PG (ref 24–34)
MCHC RBC AUTO-ENTMCNC: 32.5 G/DL (ref 31–37)
MCV RBC AUTO: 93.5 FL (ref 74–97)
MONOCYTES # BLD: 0.7 K/UL (ref 0.05–1.2)
MONOCYTES NFR BLD AUTO: 6 % (ref 3–10)
NEUTS SEG # BLD: 9 K/UL (ref 1.8–8)
NEUTS SEG NFR BLD AUTO: 83 % (ref 40–73)
PH UR STRIP: 5 [PH] (ref 4.6–8)
PLATELET # BLD AUTO: 251 K/UL (ref 135–420)
PMV BLD AUTO: 9.3 FL (ref 9.2–11.8)
POTASSIUM SERPL-SCNC: 4.1 MMOL/L (ref 3.5–5.5)
PROT SERPL-MCNC: 6.8 G/DL (ref 6.4–8.2)
PROT UR QL STRIP: NEGATIVE MG/DL
RBC # BLD AUTO: 3.72 M/UL (ref 4.2–5.3)
SODIUM SERPL-SCNC: 135 MMOL/L (ref 136–145)
SP GR UR STRIP: 1.01 (ref 1–1.03)
TSH SERPL DL<=0.05 MIU/L-ACNC: 0.91 UIU/ML (ref 0.36–3.74)
UA UROBILINOGEN AMB POC: NORMAL (ref 0.2–1)
URINALYSIS CLARITY POC: CLEAR
URINALYSIS COLOR POC: YELLOW
URINE BLOOD POC: NEGATIVE
URINE LEUKOCYTES POC: NEGATIVE
URINE NITRITES POC: NEGATIVE
WBC # BLD AUTO: 10.8 K/UL (ref 4.6–13.2)

## 2017-07-07 PROCEDURE — 80053 COMPREHEN METABOLIC PANEL: CPT | Performed by: NURSE PRACTITIONER

## 2017-07-07 PROCEDURE — 84443 ASSAY THYROID STIM HORMONE: CPT | Performed by: NURSE PRACTITIONER

## 2017-07-07 PROCEDURE — 85025 COMPLETE CBC W/AUTO DIFF WBC: CPT | Performed by: NURSE PRACTITIONER

## 2017-07-07 NOTE — PROGRESS NOTES
Lc Billingsley is a 80 y.o. female presenting today for Ankle swelling and Rash  . HPI:  Lc Billingsley presents to the office today for complaints of ankle edema with lower extremity rash. Patient noted she has been having problems with edema over the last month. She presents with a fever of unknown origin today. She is negative for pain. Review of Systems   Constitutional: Positive for fever. Respiratory: Negative for cough. Cardiovascular: Positive for leg swelling. Negative for chest pain. Genitourinary: Negative for dysuria. Skin: Positive for rash. Neurological: Negative for headaches. Allergies   Allergen Reactions    Codeine Hives    Demerol [Meperidine] Nausea Only    Levaquin [Levofloxacin] Other (comments)     Swelling in feet and legs       Current Outpatient Prescriptions   Medication Sig Dispense Refill    hydrALAZINE (APRESOLINE) 25 mg tablet TAKE 1 TABLET BY MOUTH TWICE DAILY. STOP VERAPAMIL 180 Tab 2    nitrofurantoin, macrocrystal-monohydrate, (MACROBID) 100 mg capsule Take 1 Cap by mouth two (2) times a day for 7 days. 14 Cap 0    colchicine 0.6 mg tablet Take 0.6 mg by mouth daily.  guanFACINE IR (TENEX) 1 mg IR tablet TAKE 1/2 TABLET BY MOUTH EVERY DAY. 45 Tab 3    predniSONE (DELTASONE) 5 mg tablet 2 tablets by mouth daily (Patient taking differently: Take 3 tablets daily for 10 days then 2 tablets by mouth daily) 60 Tab 1    peg 400-propylene glycol (SYSTANE, PROPYLENE GLYCOL,) 0.4-0.3 % drop Administer  to left eye as needed.  allopurinol (ZYLOPRIM) 100 mg tablet TAKE 1/2 TABLET BY MOUTH DAILY 30 Tab 0    GLUCOSAMINE HCL/CHONDR LEGER A NA (OSTEO BI-FLEX PO) Take  by mouth.  doxepin (SINEQUAN) 25 mg capsule Take 1 Cap by mouth nightly. 90 Cap 3    simvastatin (ZOCOR) 20 mg tablet Take 1 Tab by mouth nightly. 90 Tab 3    ranitidine (ZANTAC) 300 mg tablet Take 1 Tab by mouth daily.  90 Tab 3    levothyroxine (SYNTHROID) 100 mcg tablet Take 1 Tab by mouth nightly. 90 Tab 3    furosemide (LASIX) 20 mg tablet 1 tablet each AM 90 Tab 3    metoprolol tartrate (LOPRESSOR) 50 mg tablet Take 1 Tab by mouth two (2) times a day. 180 Tab 3    cyanocobalamin 1,000 mcg tablet Take 1,000 mcg by mouth daily.  aspirin delayed-release 81 mg tablet Take  by mouth daily.  folic acid 282 mcg tablet Take 800 mcg by mouth daily.  CALCIUM PHOSPHATE TRIB/VIT D3 (CITRACAL + D PO) Take  by mouth daily.  multivitamins-minerals-lutein (CENTRUM SILVER) tab tablet Take 1 Tab by mouth daily.  loratadine (CLARITIN) 10 mg tablet Take 10 mg by mouth daily.  tamoxifen (NOLVADEX) 20 mg tablet Take 20 mg by mouth daily.  clobetasol (OLUX) 0.05 % topical foam Apply  to affected area two (2) times daily as needed for Skin Irritation.  use thin film on affected area         Past Medical History:   Diagnosis Date    Autoimmune disease ([address]     Breast cancer ([address] 2001, 1/ 2014    right, left    Cancer ([address]     Colon polyps     Elevated cholesterol     Enlarged lymph nodes     pericardium    GERD (gastroesophageal reflux disease)     Hypertension     Ill-defined condition 2007 to 2010    lung-ground glass followed by Dr. Farrell Grout of breast, right 1976    benign    Mass of breast, right 1994    benign    Pure hyperglyceridemia     Thyroid disease        Past Surgical History:   Procedure Laterality Date    HX APPENDECTOMY      HX CATARACT REMOVAL Bilateral 2010    HX CHOLECYSTECTOMY      HX HEMORRHOIDECTOMY      HX HERNIA REPAIR  2004    small abdominal repair and fistula    HX HYSTERECTOMY  1991    cystocele    HX LYMPHADENECTOMY Right 7/2001    right axilla    HX MASTECTOMY Right 2001    with reconstruction    HX MASTECTOMY Left 1/14    HX POLYPECTOMY      [id number] muliple removals    HX RECTOCELE REPAIR  2003       Social History     Social History    Marital status:  Spouse name: N/A    Number of children: N/A    Years of education: N/A     Occupational History    Not on file. Social History Main Topics    Smoking status: Former Smoker     Packs/day: 2.00     Years: 9.00     Quit date: 1/1/1963    Smokeless tobacco: Never Used    Alcohol use 0.5 oz/week     1 Standard drinks or equivalent per week      Comment: occasionally    Drug use: No    Sexual activity: Not Currently     Other Topics Concern    Not on file     Social History Narrative       Patient does not have an advanced directive on file    Vitals:    07/07/17 1252   BP: 142/76   Pulse: 76   Resp: 16   Temp: (!) 100.5 °F (38.1 °C)   TempSrc: Tympanic   SpO2: 98%   Weight: 144 lb (65.3 kg)   Height: 5' 1\" (1.549 m)   PainSc:   0 - No pain       Physical Exam   Constitutional: No distress. Cardiovascular: Normal rate, regular rhythm and normal heart sounds. Pulmonary/Chest: Effort normal and breath sounds normal.   Musculoskeletal: She exhibits edema (1+ pitting). Lymphadenopathy:     She has no cervical adenopathy. Skin:   Pinpoint mild erythema rash RLE   Nursing note and vitals reviewed.       Office Visit on 07/07/2017   Component Date Value Ref Range Status    Color (UA POC) 07/07/2017 Yellow   Final    Clarity (UA POC) 07/07/2017 Clear   Final    Glucose (UA POC) 07/07/2017 Negative  Negative Final    Bilirubin (UA POC) 07/07/2017 Negative  Negative Final    Ketones (UA POC) 07/07/2017 Negative  Negative Final    Specific gravity (UA POC) 07/07/2017 1.010  1.001 - 1.035 Final    Blood (UA POC) 07/07/2017 Negative  Negative Final    pH (UA POC) 07/07/2017 5.0  4.6 - 8.0 Final    Protein (UA POC) 07/07/2017 Negative  Negative mg/dL Final    Urobilinogen (UA POC) 07/07/2017 0.2 mg/dL  0.2 - 1 Final    Nitrites (UA POC) 07/07/2017 Negative  Negative Final    Leukocyte esterase (UA POC) 07/07/2017 Negative  Negative Final   Hospital Outpatient Visit on 06/13/2017   Component Date Value Ref Range Status    Color 06/13/2017 YELLOW    Final    Appearance 06/13/2017 CLEAR    Final    Specific gravity 06/13/2017 1.015  1.005 - 1.030   Final    pH (UA) 06/13/2017 5.5  5.0 - 8.0   Final    Protein 06/13/2017 NEGATIVE   NEG mg/dL Final    Glucose 06/13/2017 NEGATIVE   NEG mg/dL Final    Ketone 06/13/2017 NEGATIVE   NEG mg/dL Final    Bilirubin 06/13/2017 NEGATIVE   NEG   Final    Blood 06/13/2017 TRACE* NEG   Final    Urobilinogen 06/13/2017 0.2  0.2 - 1.0 EU/dL Final    Nitrites 06/13/2017 NEGATIVE   NEG   Final    Leukocyte Esterase 06/13/2017 TRACE* NEG   Final    WBC 06/13/2017 2 to 3  0 - 4 /hpf Final    RBC 06/13/2017 8 to 10  0 - 5 /hpf Final    Epithelial cells 06/13/2017 FEW  0 - 5 /lpf Final    Bacteria 06/13/2017 NEGATIVE   NEG /hpf Final    Special Requests: 06/13/2017 NO SPECIAL REQUESTS    Final    Culture result: 06/13/2017 *   Final                    Value:[id number]  COLONIES/mL  STREPTOCOCCI, BETA HEMOLYTIC GROUP B     Lab Only on 06/13/2017   Component Date Value Ref Range Status    Color (UA POC) 06/13/2017 Yellow   Final    Clarity (UA POC) 06/13/2017 Slightly Cloudy   Final    Glucose (UA POC) 06/13/2017 Negative  Negative Final    Bilirubin (UA POC) 06/13/2017 Negative  Negative Final    Ketones (UA POC) 06/13/2017 Negative  Negative Final    Specific gravity (UA POC) 06/13/2017 1.010  1.001 - 1.035 Final    Blood (UA POC) 06/13/2017 2+  Negative Final    pH (UA POC) 06/13/2017 5.5  4.6 - 8.0 Final    Protein (UA POC) 06/13/2017 Negative  Negative mg/dL Final    Urobilinogen (UA POC) 06/13/2017 0.2 mg/dL  0.2 - 1 Final    Nitrites (UA POC) 06/13/2017 Negative  Negative Final    Leukocyte esterase (UA POC) 06/13/2017 Negative  Negative Final   Hospital Outpatient Visit on 06/06/2017   Component Date Value Ref Range Status    WBC 06/06/2017 9.3  4.6 - 13.2 K/uL Final    RBC 06/06/2017 3.64* 4.20 - 5.30 M/uL Final    HGB 06/06/2017 11.1* 12.0 - 16.0 g/dL Final    HCT 06/06/2017 34.4* 35.0 - 45.0 % Final    MCV 06/06/2017 94.5  74.0 - 97.0 FL Final    MCH 06/06/2017 30.5  24.0 - 34.0 PG Final    MCHC 06/06/2017 32.3  31.0 - 37.0 g/dL Final    RDW 06/06/2017 13.8  11.6 - 14.5 % Final    PLATELET 97/72/7742 593  135 - 420 K/uL Final    MPV 06/06/2017 9.2  9.2 - 11.8 FL Final    NEUTROPHILS 06/06/2017 87* 40 - 73 % Final    LYMPHOCYTES 06/06/2017 8* 21 - 52 % Final    MONOCYTES 06/06/2017 3  3 - 10 % Final    EOSINOPHILS 06/06/2017 1  0 - 5 % Final    BASOPHILS 06/06/2017 1  0 - 2 % Final    ABS. NEUTROPHILS 06/06/2017 8.1* 1.8 - 8.0 K/UL Final    ABS. LYMPHOCYTES 06/06/2017 0.7* 0.9 - 3.6 K/UL Final    ABS. MONOCYTES 06/06/2017 0.3  0.05 - 1.2 K/UL Final    ABS. EOSINOPHILS 06/06/2017 0.1  0.0 - 0.4 K/UL Final    ABS. BASOPHILS 06/06/2017 0.1* 0.0 - 0.06 K/UL Final    DF 06/06/2017 AUTOMATED    Final    Sodium 06/06/2017 133* 136 - 145 mmol/L Final    Potassium 06/06/2017 4.3  3.5 - 5.5 mmol/L Final    Chloride 06/06/2017 96* 100 - 108 mmol/L Final    CO2 06/06/2017 27  21 - 32 mmol/L Final    Anion gap 06/06/2017 10  3.0 - 18 mmol/L Final    Glucose 06/06/2017 120* 74 - 99 mg/dL Final    BUN 06/06/2017 33* 7.0 - 18 MG/DL Final    Creatinine 06/06/2017 1.37* 0.6 - 1.3 MG/DL Final    BUN/Creatinine ratio 06/06/2017 24* 12 - 20   Final    GFR est AA 06/06/2017 45* >60 ml/min/1.73m2 Final    GFR est non-AA 06/06/2017 37* >60 ml/min/1.73m2 Final    Comment: (NOTE)  Estimated GFR is calculated using the Modification of Diet in Renal   Disease (MDRD) Study equation, reported for both  Americans   (GFRAA) and non- Americans (GFRNA), and normalized to 1.73m2   body surface area. The physician must decide which value applies to   the patient. The MDRD study equation should only be used in   individuals age 25 or older.  It has not been validated for the   following: pregnant women, patients with serious comorbid conditions,   or on certain medications, or persons with extremes of body size,   muscle mass, or nutritional status.  Calcium 06/06/2017 9.6  8.5 - 10.1 MG/DL Final    Bilirubin, total 06/06/2017 0.4  0.2 - 1.0 MG/DL Final    ALT (SGPT) 06/06/2017 93* 13 - 56 U/L Final    AST (SGOT) 06/06/2017 30  15 - 37 U/L Final    Alk.  phosphatase 06/06/2017 49  45 - 117 U/L Final    Protein, total 06/06/2017 7.3  6.4 - 8.2 g/dL Final    Albumin 06/06/2017 3.4  3.4 - 5.0 g/dL Final    Globulin 06/06/2017 3.9  2.0 - 4.0 g/dL Final    A-G Ratio 06/06/2017 0.9  0.8 - 1.7   Final    C-Reactive protein 06/06/2017 0.5* 0 - 0.3 mg/dL Final    Sed rate, automated 06/06/2017 57* 0 - 30 mm/hr Final   Hospital Outpatient Visit on 04/25/2017   Component Date Value Ref Range Status    Special Requests: 04/25/2017 NO SPECIAL REQUESTS    Final    Culture result: 04/25/2017 NO GROWTH 2 DAYS    Final   Office Visit on 04/25/2017   Component Date Value Ref Range Status    Color (UA POC) 04/25/2017 Yellow   Final    Clarity (UA POC) 04/25/2017 Clear   Final    Glucose (UA POC) 04/25/2017 Negative  Negative Final    Bilirubin (UA POC) 04/25/2017 Negative  Negative Final    Ketones (UA POC) 04/25/2017 Negative  Negative Final    Specific gravity (UA POC) 04/25/2017 1.010  1.001 - 1.035 Final    Blood (UA POC) 04/25/2017 Negative  Negative Final    pH (UA POC) 04/25/2017 5.5  4.6 - 8.0 Final    Protein (UA POC) 04/25/2017 Negative  Negative mg/dL Final    Urobilinogen (UA POC) 04/25/2017 0.2 mg/dL  0.2 - 1 Final    Nitrites (UA POC) 04/25/2017 Negative  Negative Final    Leukocyte esterase (UA POC) 04/25/2017 1+  Negative Final   Hospital Outpatient Visit on 04/12/2017   Component Date Value Ref Range Status    Sodium 04/12/2017 140  136 - 145 mmol/L Final    Potassium 04/12/2017 4.2  3.5 - 5.5 mmol/L Final    Chloride 04/12/2017 101  100 - 108 mmol/L Final    CO2 04/12/2017 29  21 - 32 mmol/L Final    Anion gap 04/12/2017 10  3.0 - 18 mmol/L Final    Glucose 04/12/2017 83  74 - 99 mg/dL Final    BUN 04/12/2017 39* 7.0 - 18 MG/DL Final    Creatinine 04/12/2017 1.54* 0.6 - 1.3 MG/DL Final    BUN/Creatinine ratio 04/12/2017 25* 12 - 20   Final    GFR est AA 04/12/2017 39* >60 ml/min/1.73m2 Final    GFR est non-AA 04/12/2017 32* >60 ml/min/1.73m2 Final    Comment: (NOTE)  Estimated GFR is calculated using the Modification of Diet in Renal   Disease (MDRD) Study equation, reported for both  Americans   (GFRAA) and non- Americans (GFRNA), and normalized to 1.73m2   body surface area. The physician must decide which value applies to   the patient. The MDRD study equation should only be used in   individuals age 25 or older. It has not been validated for the   following: pregnant women, patients with serious comorbid conditions,   or on certain medications, or persons with extremes of body size,   muscle mass, or nutritional status.  Calcium 04/12/2017 9.4  8.5 - 10.1 MG/DL Final    Bilirubin, total 04/12/2017 0.4  0.2 - 1.0 MG/DL Final    ALT (SGPT) 04/12/2017 34  13 - 56 U/L Final    AST (SGOT) 04/12/2017 21  15 - 37 U/L Final    Alk.  phosphatase 04/12/2017 46  45 - 117 U/L Final    Protein, total 04/12/2017 6.8  6.4 - 8.2 g/dL Final    Albumin 04/12/2017 3.6  3.4 - 5.0 g/dL Final    Globulin 04/12/2017 3.2  2.0 - 4.0 g/dL Final    A-G Ratio 04/12/2017 1.1  0.8 - 1.7   Final    WBC 04/12/2017 7.7  4.6 - 13.2 K/uL Final    RBC 04/12/2017 3.61* 4.20 - 5.30 M/uL Final    HGB 04/12/2017 11.3* 12.0 - 16.0 g/dL Final    HCT 04/12/2017 36.9  35.0 - 45.0 % Final    MCV 04/12/2017 102.2* 74.0 - 97.0 FL Final    MCH 04/12/2017 31.3  24.0 - 34.0 PG Final    MCHC 04/12/2017 30.6* 31.0 - 37.0 g/dL Final    RDW 04/12/2017 14.7* 11.6 - 14.5 % Final    PLATELET 30/14/9878 968  135 - 420 K/uL Final    MPV 04/12/2017 9.2  9.2 - 11.8 FL Final    NEUTROPHILS 04/12/2017 59  40 - 73 % Final    LYMPHOCYTES 04/12/2017 27  21 - 52 % Final    MONOCYTES 04/12/2017 10  3 - 10 % Final    EOSINOPHILS 04/12/2017 3  0 - 5 % Final    BASOPHILS 04/12/2017 1  0 - 2 % Final    ABS. NEUTROPHILS 04/12/2017 4.5  1.8 - 8.0 K/UL Final    ABS. LYMPHOCYTES 04/12/2017 2.1  0.9 - 3.6 K/UL Final    ABS. MONOCYTES 04/12/2017 0.8  0.05 - 1.2 K/UL Final    ABS. EOSINOPHILS 04/12/2017 0.2  0.0 - 0.4 K/UL Final    ABS. BASOPHILS 04/12/2017 0.1* 0.0 - 0.06 K/UL Final    DF 04/12/2017 AUTOMATED    Final    Sed rate, automated 04/12/2017 33* 0 - 30 mm/hr Final    LIPID PROFILE 04/12/2017        Final    Cholesterol, total 04/12/2017 159  <200 MG/DL Final    Triglyceride 04/12/2017 119  <150 MG/DL Final    Comment: The drugs N-acetylcysteine (NAC) and  Metamiszole have been found to cause falsely  low results in this chemical assay. Please  be sure to submit blood samples obtained  BEFORE administration of either of these  drugs to assure correct results.  HDL Cholesterol 04/12/2017 83* 40 - 60 MG/DL Final    LDL, calculated 04/12/2017 52.2  0 - 100 MG/DL Final    VLDL, calculated 04/12/2017 23.8  MG/DL Final    CHOL/HDL Ratio 04/12/2017 1.9  0 - 5.0   Final       .  Results for orders placed or performed in visit on 07/07/17   AMB POC URINALYSIS DIP STICK AUTO W/O MICRO   Result Value Ref Range    Color (UA POC) Yellow     Clarity (UA POC) Clear     Glucose (UA POC) Negative Negative    Bilirubin (UA POC) Negative Negative    Ketones (UA POC) Negative Negative    Specific gravity (UA POC) 1.010 1.001 - 1.035    Blood (UA POC) Negative Negative    pH (UA POC) 5.0 4.6 - 8.0    Protein (UA POC) Negative Negative mg/dL    Urobilinogen (UA POC) 0.2 mg/dL 0.2 - 1    Nitrites (UA POC) Negative Negative    Leukocyte esterase (UA POC) Negative Negative       Assessment / Plan:      ICD-10-CM ICD-9-CM    1. Essential hypertension I10 401.9    2.  Edema, unspecified type K45.1 960.7 METABOLIC PANEL, COMPREHENSIVE      CBC WITH AUTOMATED DIFF      TSH 3RD GENERATION   3. Fever, unspecified fever cause R50.9 780.60 AMB POC URINALYSIS DIP STICK AUTO W/O MICRO     Labs today  POC UA- normal   Tylenol for fever  Continue antibotic  NO salt  Keep scheduled appt with PCP    Follow-up Disposition:  Return in about 3 weeks (around 7/28/2017), or if symptoms worsen or fail to improve. I asked the patient if she  had any questions and answered her  questions.   The patient stated that she understands the treatment plan and agrees with the treatment plan

## 2017-08-07 ENCOUNTER — HOSPITAL ENCOUNTER (OUTPATIENT)
Dept: LAB | Age: 82
Discharge: HOME | End: 2017-08-07
Payer: MEDICARE

## 2017-08-07 ENCOUNTER — OFFICE VISIT (OUTPATIENT)
Dept: INTERNAL MEDICINE CLINIC | Age: 82
End: 2017-08-07

## 2017-08-07 VITALS
TEMPERATURE: 98.7 F | WEIGHT: 147 LBS | RESPIRATION RATE: 16 BRPM | HEART RATE: 72 BPM | DIASTOLIC BLOOD PRESSURE: 84 MMHG | SYSTOLIC BLOOD PRESSURE: 168 MMHG | OXYGEN SATURATION: 98 % | HEIGHT: 61 IN | BODY MASS INDEX: 27.75 KG/M2

## 2017-08-07 DIAGNOSIS — I10 ESSENTIAL HYPERTENSION: ICD-10-CM

## 2017-08-07 DIAGNOSIS — M35.3 PMR (POLYMYALGIA RHEUMATICA) (HCC): ICD-10-CM

## 2017-08-07 DIAGNOSIS — M35.3 PMR (POLYMYALGIA RHEUMATICA) (HCC): Primary | ICD-10-CM

## 2017-08-07 DIAGNOSIS — Z78.0 MENOPAUSE: ICD-10-CM

## 2017-08-07 DIAGNOSIS — G44.039 EPISODIC PAROXYSMAL HEMICRANIA, NOT INTRACTABLE: ICD-10-CM

## 2017-08-07 LAB
CRP SERPL-MCNC: 1.4 MG/DL (ref 0–0.3)
ERYTHROCYTE [SEDIMENTATION RATE] IN BLOOD: 56 MM/HR (ref 0–30)

## 2017-08-07 PROCEDURE — 85652 RBC SED RATE AUTOMATED: CPT | Performed by: INTERNAL MEDICINE

## 2017-08-07 PROCEDURE — 86140 C-REACTIVE PROTEIN: CPT | Performed by: INTERNAL MEDICINE

## 2017-08-07 NOTE — PATIENT INSTRUCTIONS
Health Maintenance Due   Topic Date Due    Annual Well Visit  08/23/1997    Flu Vaccine  08/01/2017

## 2017-08-07 NOTE — PROGRESS NOTES
1. Have you been to the ER, urgent care clinic since your last visit? Hospitalized since your last visit? No    2. Have you seen or consulted any other health care providers outside of the [address] [place] [address] since your last visit? Include any pap smears or colon screening.  Dr. Chiara Alex

## 2017-08-07 NOTE — MR AVS SNAPSHOT
Visit Information Date & Time Provider Department Dept. Phone Encounter #  
 8/7/2017 10:45 AM Efren Rodriguez MD [facility] [place] [phone] [id number] Your Appointments 9/6/2017 12:30 PM  
Follow Up with Efren Rodriguez MD  
[address] [facility]) Appt Note: 5 mo f/u  
 [address], [address] [facility] [address]  
  
   
 [address] [facility] [address]  
  
    
 11/9/2017  8:15 AM  
Follow Up with Efren Rodriguez MD  
[facility] [place] [address] [facility]) Appt Note: 3 mo f/u  
 [address], [address] [facility] [address]  
[phone] Upcoming Health Maintenance Date Due  
 MEDICARE YEARLY EXAM 8/23/1997 INFLUENZA AGE 9 TO ADULT 8/1/2017 GLAUCOMA SCREENING Q2Y 3/1/2018 DTaP/Tdap/Td series (2 - Td) 10/20/2026 Allergies as of 8/7/2017  Review Complete On: 8/7/2017 By: Brionna Jennings LPN Severity Noted Reaction Type Reactions Codeine  08/04/2014    Hives Demerol [Meperidine]  08/04/2014    Nausea Only Levaquin [Levofloxacin]  08/04/2014    Other (comments) Swelling in feet and legs Current Immunizations  Reviewed on 6/26/2017 Name Date Influenza High Dose Vaccine PF 10/13/2016  8:53 AM  
 Influenza Vaccine 10/6/2015 Pneumococcal Conjugate (PCV-13) 10/6/2015 Pneumococcal Polysaccharide (PPSV-23) 4/9/2010, 4/11/2003, 9/9/1993 Td 7/30/2008, 9/9/1993 Tdap 10/20/2016  8:40 AM  
 Zoster Vaccine, Live 1/14/2009 Not reviewed this visit You Were Diagnosed With   
  
 Codes Comments PMR (polymyalgia rheumatica) (HCC)    -  Primary ICD-10-CM: M35.3 ICD-9-CM: 680 Menopause     ICD-10-CM: Z78.0 ICD-9-CM: 627.2 Vitals BP Pulse Temp Resp Height(growth percentile)  168/84 (BP 1 Location: Left arm, BP Patient Position: Sitting) 72 98.7 °F (37.1 °C) (Tympanic) 16 5' 1\" (1.549 m) Weight(growth percentile) SpO2 BMI OB Status Smoking Status 147 lb (66.7 kg) 98% 27.78 kg/m2 Hysterectomy Former Smoker BMI and BSA Data Body Mass Index Body Surface Area  
 27.78 kg/m 2 1.69 m 2 Preferred Pharmacy Pharmacy Name Phone [facility] Perdomo [address] [place] [address] [phone] Your Updated Medication List  
  
   
This list is accurate as of: 8/7/17  1:11 PM.  Always use your most recent med list.  
  
  
  
  
 allopurinol 100 mg tablet Commonly known as:  ZYLOPRIM  
TAKE 1/2 TABLET BY MOUTH DAILY  
  
 aspirin delayed-release 81 mg tablet Take  by mouth daily. CENTRUM SILVER Tab tablet Generic drug:  multivitamins-minerals-lutein Take 1 Tab by mouth daily. CITRACAL + D PO Take  by mouth daily. clobetasol 0.05 % topical foam  
Commonly known as:  OLUX Apply  to affected area two (2) times daily as needed for Skin Irritation. use thin film on affected area  
  
 colchicine 0.6 mg tablet Take 0.6 mg by mouth daily. cyanocobalamin 1,000 mcg tablet Take 1,000 mcg by mouth daily. doxepin 25 mg capsule Commonly known as:  SINEquan Take 1 Cap by mouth nightly. folic acid 954 mcg tablet Take 800 mcg by mouth daily. furosemide 20 mg tablet Commonly known as:  LASIX  
1 tablet each AM  
  
 guanFACINE IR 1 mg IR tablet Commonly known as:  TENEX  
TAKE 1/2 TABLET BY MOUTH EVERY DAY. hydrALAZINE 25 mg tablet Commonly known as:  APRESOLINE  
TAKE 1 TABLET BY MOUTH TWICE DAILY. STOP VERAPAMIL  
  
 levothyroxine 100 mcg tablet Commonly known as:  SYNTHROID Take 1 Tab by mouth nightly. loratadine 10 mg tablet Commonly known as:  Gene She Take 10 mg by mouth daily. metoprolol tartrate 50 mg tablet Commonly known as:  LOPRESSOR Take 1 Tab by mouth two (2) times a day.   
  
 OSTEO BI-FLEX PO  
 Take  by mouth.  
  
 predniSONE 5 mg tablet Commonly known as:  DELTASONE  
2 tablets by mouth daily  
  
 raNITIdine 300 mg tablet Commonly known as:  ZANTAC Take 1 Tab by mouth daily. simvastatin 20 mg tablet Commonly known as:  ZOCOR Take 1 Tab by mouth nightly. SYSTANE (PROPYLENE GLYCOL) 0.4-0.3 % Drop Generic drug:  peg 400-propylene glycol Administer  to left eye as needed. tamoxifen 20 mg tablet Commonly known as:  NOLVADEX Take 20 mg by mouth daily. To-Do List   
 08/08/2017 Imaging:  DEXA BONE DENSITY STUDY AXIAL Patient Instructions Health Maintenance Due Topic Date Due  
 Annual Well Visit  08/23/1997  Flu Vaccine  08/01/2017 Introducing [facility]! [employer] introduces [employer] patient portal. Now you can access parts of your medical record, email your doctor's office, and request medication refills online. 1. In your internet browser, go to [email or web address] 2. Click on the First Time User? Click Here link in the Sign In box. You will see the New Member Sign Up page. 3. Enter your [employer] Access Code exactly as it appears below. You will not need to use this code after youve completed the sign-up process. If you do not sign up before the expiration date, you must request a new code. · [employer] Access Code: [email or web address] Expires: 11/5/2017  1:11 PM 
 
4. Enter the last four digits of your Social Security Number (xxxx) and Date of Birth (mm/dd/yyyy) as indicated and click Submit. You will be taken to the next sign-up page. 5. Create a Book[employer] ID. This will be your [employer] login ID and cannot be changed, so think of one that is secure and easy to remember. 6. Create a [employer] password. You can change your password at any time. 7. Enter your Password Reset Question and Answer. This can be used at a later time if you forget your password. 8. Enter your e-mail address. You will receive e-mail notification when new information is available in [address] 9. Click Sign Up. You can now view and download portions of your medical record. 10. Click the Download Summary menu link to download a portable copy of your medical information. If you have questions, please visit the Frequently Asked Questions section of the [employer] website. Remember, [employer] is NOT to be used for urgent needs. For medical emergencies, dial 911. Now available from your iPhone and Android! Please provide this summary of care documentation to your next provider. Your primary care clinician is listed as Keara Jett. If you have any questions after today's visit, please call [phone].

## 2017-08-08 ENCOUNTER — TELEPHONE (OUTPATIENT)
Dept: INTERNAL MEDICINE CLINIC | Age: 82
End: 2017-08-08

## 2017-08-08 RX ORDER — PREDNISONE 5 MG/1
TABLET ORAL
Qty: 116 TAB | Refills: 0 | Status: SHIPPED | OUTPATIENT
Start: 2017-08-08 | End: 2017-09-25 | Stop reason: DRUGHIGH

## 2017-08-08 NOTE — TELEPHONE ENCOUNTER
Per Dr. Niranjan Boone, I called patient to let her know that he wants her to start on prednisone as prescribed. And then see her back in the office. She verbalized understanding.

## 2017-08-08 NOTE — TELEPHONE ENCOUNTER
Patient wanted to speak to someone concerning her prednisone and Lab work she had done yesterday [phone]

## 2017-08-09 RX ORDER — LOSARTAN POTASSIUM AND HYDROCHLOROTHIAZIDE 25; 100 MG/1; MG/1
1 TABLET ORAL DAILY
Qty: 90 TAB | Refills: 3 | Status: SHIPPED | OUTPATIENT
Start: 2017-08-09 | End: 2017-08-14

## 2017-08-09 NOTE — PROGRESS NOTES
The patient presents to the office today with the chief complaint of PMR    HPI    The patient remains on Prednisone for polymyalgia rheumatica. She is feeling better with decreased aching. She does not a shooting pain from right temple area running towards her eye. This is infrequent and is a brief shooting pain. The patient remains on medications for hypertension. The patient is tolerating the medications well. The patient is post menopause and she is in need of a bone density      Review of Systems   Respiratory: Negative for shortness of breath. Cardiovascular: Negative for chest pain and leg swelling. Neurological: Positive for headaches. Allergies   Allergen Reactions    Codeine Hives    Demerol [Meperidine] Nausea Only    Levaquin [Levofloxacin] Other (comments)     Swelling in feet and legs       Current Outpatient Prescriptions   Medication Sig Dispense Refill    predniSONE (DELTASONE) 5 mg tablet Take 15mg (three tablets) daily for three weeks and then 12mg (2!/2 tablets) for three weeks. 116 Tab 0    hydrALAZINE (APRESOLINE) 25 mg tablet TAKE 1 TABLET BY MOUTH TWICE DAILY. STOP VERAPAMIL 180 Tab 2    guanFACINE IR (TENEX) 1 mg IR tablet TAKE 1/2 TABLET BY MOUTH EVERY DAY. 45 Tab 3    predniSONE (DELTASONE) 5 mg tablet 2 tablets by mouth daily (Patient taking differently: Take 3 tablets daily for 10 days then 2 tablets by mouth daily) 60 Tab 1    peg 400-propylene glycol (SYSTANE, PROPYLENE GLYCOL,) 0.4-0.3 % drop Administer  to left eye as needed.  allopurinol (ZYLOPRIM) 100 mg tablet TAKE 1/2 TABLET BY MOUTH DAILY 30 Tab 0    GLUCOSAMINE HCL/CHONDR LEGER A NA (OSTEO BI-FLEX PO) Take  by mouth.  doxepin (SINEQUAN) 25 mg capsule Take 1 Cap by mouth nightly. 90 Cap 3    simvastatin (ZOCOR) 20 mg tablet Take 1 Tab by mouth nightly. 90 Tab 3    ranitidine (ZANTAC) 300 mg tablet Take 1 Tab by mouth daily.  90 Tab 3    levothyroxine (SYNTHROID) 100 mcg tablet Take 1 Tab by mouth nightly. 90 Tab 3    furosemide (LASIX) 20 mg tablet 1 tablet each AM 90 Tab 3    metoprolol tartrate (LOPRESSOR) 50 mg tablet Take 1 Tab by mouth two (2) times a day. 180 Tab 3    cyanocobalamin 1,000 mcg tablet Take 1,000 mcg by mouth daily.  aspirin delayed-release 81 mg tablet Take  by mouth daily.  folic acid 809 mcg tablet Take 800 mcg by mouth daily.  CALCIUM PHOSPHATE TRIB/VIT D3 (CITRACAL + D PO) Take  by mouth daily.  multivitamins-minerals-lutein (CENTRUM SILVER) tab tablet Take 1 Tab by mouth daily.  loratadine (CLARITIN) 10 mg tablet Take 10 mg by mouth daily.  tamoxifen (NOLVADEX) 20 mg tablet Take 20 mg by mouth daily.  clobetasol (OLUX) 0.05 % topical foam Apply  to affected area two (2) times daily as needed for Skin Irritation.  use thin film on affected area         Past Medical History:   Diagnosis Date    Autoimmune disease ([address]     Breast cancer ([address] 2001, 1/ 2014    right, left    Cancer ([address]     Colon polyps     Elevated cholesterol     Enlarged lymph nodes     pericardium    GERD (gastroesophageal reflux disease)     Hypertension     Ill-defined condition 2007 to 2010    lung-ground glass followed by Dr. Tristan Mike of breast, right 1976    benign    Mass of breast, right 1994    benign    Pure hyperglyceridemia     Thyroid disease        Past Surgical History:   Procedure Laterality Date    HX APPENDECTOMY      HX CATARACT REMOVAL Bilateral 2010    HX CHOLECYSTECTOMY      HX HEMORRHOIDECTOMY      HX HERNIA REPAIR  2004    small abdominal repair and fistula    HX HYSTERECTOMY  1991    cystocele    HX LYMPHADENECTOMY Right 7/2001    right axilla    HX MASTECTOMY Right 2001    with reconstruction    HX MASTECTOMY Left 1/14    HX POLYPECTOMY      [id number] muliple removals    HX RECTOCELE REPAIR  2003       Social History     Social History    Marital status:      Spouse name: N/A    Number of children: N/A    Years of education: N/A     Occupational History    Not on file. Social History Main Topics    Smoking status: Former Smoker     Packs/day: 2.00     Years: 9.00     Quit date: 1/1/1963    Smokeless tobacco: Never Used    Alcohol use 0.5 oz/week     1 Standard drinks or equivalent per week      Comment: occasionally    Drug use: No    Sexual activity: Not Currently     Other Topics Concern    Not on file     Social History Narrative       Patient does not have an advanced directive on file    Visit Vitals    /84 (BP 1 Location: Left arm, BP Patient Position: Sitting)    Pulse 72    Temp 98.7 °F (37.1 °C) (Tympanic)    Resp 16    Ht 5' 1\" (1.549 m)    Wt 147 lb (66.7 kg)    SpO2 98%    BMI 27.78 kg/m2       Physical Exam   Neck: No thyromegaly present. Cardiovascular: Regular rhythm. Exam reveals no gallop. No murmur heard. Pulmonary/Chest: She has no wheezes. She has no rales. Abdominal: Soft. Bowel sounds are normal. She exhibits no distension and no mass. There is no tenderness. Musculoskeletal: She exhibits no edema.    Neurological:   Right temporal artery is full and is nontender       BMI:  [facility] Outpatient Visit on 08/07/2017   Component Date Value Ref Range Status    C-Reactive protein 08/07/2017 1.4* 0 - 0.3 mg/dL Final    Sed rate, automated 08/07/2017 56* 0 - 30 mm/hr Final   Hospital Outpatient Visit on 07/07/2017   Component Date Value Ref Range Status    Sodium 07/07/2017 135* 136 - 145 mmol/L Final    Potassium 07/07/2017 4.1  3.5 - 5.5 mmol/L Final    Chloride 07/07/2017 99* 100 - 108 mmol/L Final    CO2 07/07/2017 27  21 - 32 mmol/L Final    Anion gap 07/07/2017 9  3.0 - 18 mmol/L Final    Glucose 07/07/2017 116* 74 - 99 mg/dL Final    BUN 07/07/2017 41* 7.0 - 18 MG/DL Final    Creatinine 07/07/2017 1.30  0.6 - 1.3 MG/DL Final    BUN/Creatinine ratio 07/07/2017 32* 12 - 20   Final    GFR est AA 07/07/2017 47* >60 ml/min/1.73m2 Final    GFR est non-AA 07/07/2017 39* >60 ml/min/1.73m2 Final    Comment: (NOTE)  Estimated GFR is calculated using the Modification of Diet in Renal   Disease (MDRD) Study equation, reported for both  Americans   (GFRAA) and non- Americans (GFRNA), and normalized to 1.73m2   body surface area. The physician must decide which value applies to   the patient. The MDRD study equation should only be used in   individuals age 25 or older. It has not been validated for the   following: pregnant women, patients with serious comorbid conditions,   or on certain medications, or persons with extremes of body size,   muscle mass, or nutritional status.  Calcium 07/07/2017 8.7  8.5 - 10.1 MG/DL Final    Bilirubin, total 07/07/2017 0.4  0.2 - 1.0 MG/DL Final    ALT (SGPT) 07/07/2017 31  13 - 56 U/L Final    AST (SGOT) 07/07/2017 22  15 - 37 U/L Final    Alk. phosphatase 07/07/2017 38* 45 - 117 U/L Final    Protein, total 07/07/2017 6.8  6.4 - 8.2 g/dL Final    Albumin 07/07/2017 3.3* 3.4 - 5.0 g/dL Final    Globulin 07/07/2017 3.5  2.0 - 4.0 g/dL Final    A-G Ratio 07/07/2017 0.9  0.8 - 1.7   Final    WBC 07/07/2017 10.8  4.6 - 13.2 K/uL Final    RBC 07/07/2017 3.72* 4.20 - 5.30 M/uL Final    HGB 07/07/2017 11.3* 12.0 - 16.0 g/dL Final    HCT 07/07/2017 34.8* 35.0 - 45.0 % Final    MCV 07/07/2017 93.5  74.0 - 97.0 FL Final    MCH 07/07/2017 30.4  24.0 - 34.0 PG Final    MCHC 07/07/2017 32.5  31.0 - 37.0 g/dL Final    RDW 07/07/2017 14.8* 11.6 - 14.5 % Final    PLATELET 01/17/7125 466  135 - 420 K/uL Final    MPV 07/07/2017 9.3  9.2 - 11.8 FL Final    NEUTROPHILS 07/07/2017 83* 40 - 73 % Final    LYMPHOCYTES 07/07/2017 6* 21 - 52 % Final    MONOCYTES 07/07/2017 6  3 - 10 % Final    EOSINOPHILS 07/07/2017 5  0 - 5 % Final    BASOPHILS 07/07/2017 0  0 - 2 % Final    ABS. NEUTROPHILS 07/07/2017 9.0* 1.8 - 8.0 K/UL Final    ABS.  LYMPHOCYTES 07/07/2017 0.6* 0.9 - 3.6 K/UL Final    ABS. MONOCYTES 07/07/2017 0.7  0.05 - 1.2 K/UL Final    ABS. EOSINOPHILS 07/07/2017 0.6* 0.0 - 0.4 K/UL Final    ABS.  BASOPHILS 07/07/2017 0.0  0.0 - 0.06 K/UL Final    DF 07/07/2017 AUTOMATED    Final    TSH 07/07/2017 0.91  0.36 - 3.74 uIU/mL Final   Office Visit on 07/07/2017   Component Date Value Ref Range Status    Color (UA POC) 07/07/2017 Yellow   Final    Clarity (UA POC) 07/07/2017 Clear   Final    Glucose (UA POC) 07/07/2017 Negative  Negative Final    Bilirubin (UA POC) 07/07/2017 Negative  Negative Final    Ketones (UA POC) 07/07/2017 Negative  Negative Final    Specific gravity (UA POC) 07/07/2017 1.010  1.001 - 1.035 Final    Blood (UA POC) 07/07/2017 Negative  Negative Final    pH (UA POC) 07/07/2017 5.0  4.6 - 8.0 Final    Protein (UA POC) 07/07/2017 Negative  Negative mg/dL Final    Urobilinogen (UA POC) 07/07/2017 0.2 mg/dL  0.2 - 1 Final    Nitrites (UA POC) 07/07/2017 Negative  Negative Final    Leukocyte esterase (UA POC) 07/07/2017 Negative  Negative Final   Hospital Outpatient Visit on 06/13/2017   Component Date Value Ref Range Status    Color 06/13/2017 YELLOW    Final    Appearance 06/13/2017 CLEAR    Final    Specific gravity 06/13/2017 1.015  1.005 - 1.030   Final    pH (UA) 06/13/2017 5.5  5.0 - 8.0   Final    Protein 06/13/2017 NEGATIVE   NEG mg/dL Final    Glucose 06/13/2017 NEGATIVE   NEG mg/dL Final    Ketone 06/13/2017 NEGATIVE   NEG mg/dL Final    Bilirubin 06/13/2017 NEGATIVE   NEG   Final    Blood 06/13/2017 TRACE* NEG   Final    Urobilinogen 06/13/2017 0.2  0.2 - 1.0 EU/dL Final    Nitrites 06/13/2017 NEGATIVE   NEG   Final    Leukocyte Esterase 06/13/2017 TRACE* NEG   Final    WBC 06/13/2017 2 to 3  0 - 4 /hpf Final    RBC 06/13/2017 8 to 10  0 - 5 /hpf Final    Epithelial cells 06/13/2017 FEW  0 - 5 /lpf Final    Bacteria 06/13/2017 NEGATIVE   NEG /hpf Final    Special Requests: 06/13/2017 NO SPECIAL REQUESTS    Final  Culture result: 06/13/2017 *   Final                    Value:[id number]  COLONIES/mL  STREPTOCOCCI, BETA HEMOLYTIC GROUP B     Lab Only on 06/13/2017   Component Date Value Ref Range Status    Color (UA POC) 06/13/2017 Yellow   Final    Clarity (UA POC) 06/13/2017 Slightly Cloudy   Final    Glucose (UA POC) 06/13/2017 Negative  Negative Final    Bilirubin (UA POC) 06/13/2017 Negative  Negative Final    Ketones (UA POC) 06/13/2017 Negative  Negative Final    Specific gravity (UA POC) 06/13/2017 1.010  1.001 - 1.035 Final    Blood (UA POC) 06/13/2017 2+  Negative Final    pH (UA POC) 06/13/2017 5.5  4.6 - 8.0 Final    Protein (UA POC) 06/13/2017 Negative  Negative mg/dL Final    Urobilinogen (UA POC) 06/13/2017 0.2 mg/dL  0.2 - 1 Final    Nitrites (UA POC) 06/13/2017 Negative  Negative Final    Leukocyte esterase (UA POC) 06/13/2017 Negative  Negative Final   Hospital Outpatient Visit on 06/06/2017   Component Date Value Ref Range Status    WBC 06/06/2017 9.3  4.6 - 13.2 K/uL Final    RBC 06/06/2017 3.64* 4.20 - 5.30 M/uL Final    HGB 06/06/2017 11.1* 12.0 - 16.0 g/dL Final    HCT 06/06/2017 34.4* 35.0 - 45.0 % Final    MCV 06/06/2017 94.5  74.0 - 97.0 FL Final    MCH 06/06/2017 30.5  24.0 - 34.0 PG Final    MCHC 06/06/2017 32.3  31.0 - 37.0 g/dL Final    RDW 06/06/2017 13.8  11.6 - 14.5 % Final    PLATELET 32/01/4883 068  135 - 420 K/uL Final    MPV 06/06/2017 9.2  9.2 - 11.8 FL Final    NEUTROPHILS 06/06/2017 87* 40 - 73 % Final    LYMPHOCYTES 06/06/2017 8* 21 - 52 % Final    MONOCYTES 06/06/2017 3  3 - 10 % Final    EOSINOPHILS 06/06/2017 1  0 - 5 % Final    BASOPHILS 06/06/2017 1  0 - 2 % Final    ABS. NEUTROPHILS 06/06/2017 8.1* 1.8 - 8.0 K/UL Final    ABS. LYMPHOCYTES 06/06/2017 0.7* 0.9 - 3.6 K/UL Final    ABS. MONOCYTES 06/06/2017 0.3  0.05 - 1.2 K/UL Final    ABS. EOSINOPHILS 06/06/2017 0.1  0.0 - 0.4 K/UL Final    ABS.  BASOPHILS 06/06/2017 0.1* 0.0 - 0.06 K/UL Final    DF 06/06/2017 AUTOMATED    Final    Sodium 06/06/2017 133* 136 - 145 mmol/L Final    Potassium 06/06/2017 4.3  3.5 - 5.5 mmol/L Final    Chloride 06/06/2017 96* 100 - 108 mmol/L Final    CO2 06/06/2017 27  21 - 32 mmol/L Final    Anion gap 06/06/2017 10  3.0 - 18 mmol/L Final    Glucose 06/06/2017 120* 74 - 99 mg/dL Final    BUN 06/06/2017 33* 7.0 - 18 MG/DL Final    Creatinine 06/06/2017 1.37* 0.6 - 1.3 MG/DL Final    BUN/Creatinine ratio 06/06/2017 24* 12 - 20   Final    GFR est AA 06/06/2017 45* >60 ml/min/1.73m2 Final    GFR est non-AA 06/06/2017 37* >60 ml/min/1.73m2 Final    Comment: (NOTE)  Estimated GFR is calculated using the Modification of Diet in Renal   Disease (MDRD) Study equation, reported for both  Americans   (GFRAA) and non- Americans (GFRNA), and normalized to 1.73m2   body surface area. The physician must decide which value applies to   the patient. The MDRD study equation should only be used in   individuals age 25 or older. It has not been validated for the   following: pregnant women, patients with serious comorbid conditions,   or on certain medications, or persons with extremes of body size,   muscle mass, or nutritional status.  Calcium 06/06/2017 9.6  8.5 - 10.1 MG/DL Final    Bilirubin, total 06/06/2017 0.4  0.2 - 1.0 MG/DL Final    ALT (SGPT) 06/06/2017 93* 13 - 56 U/L Final    AST (SGOT) 06/06/2017 30  15 - 37 U/L Final    Alk.  phosphatase 06/06/2017 49  45 - 117 U/L Final    Protein, total 06/06/2017 7.3  6.4 - 8.2 g/dL Final    Albumin 06/06/2017 3.4  3.4 - 5.0 g/dL Final    Globulin 06/06/2017 3.9  2.0 - 4.0 g/dL Final    A-G Ratio 06/06/2017 0.9  0.8 - 1.7   Final    C-Reactive protein 06/06/2017 0.5* 0 - 0.3 mg/dL Final    Sed rate, automated 06/06/2017 57* 0 - 30 mm/hr Final       .  Results for orders placed or performed during the hospital encounter of 08/07/17   C REACTIVE PROTEIN, QT   Result Value Ref Range    C-Reactive protein 1.4 (H) 0 - 0.3 mg/dL   SED RATE (ESR)   Result Value Ref Range    Sed rate, automated 56 (H) 0 - 30 mm/hr       Assessment / Plan      ICD-10-CM ICD-9-CM    1. PMR (polymyalgia rheumatica) ([facility]) M35.3 725 C REACTIVE PROTEIN, QT      SED RATE (ESR)      AR COLLECTION VENOUS BLOOD,VENIPUNCTURE   2. Menopause Z78.0 627.2 DEXA BONE DENSITY STUDY AXIAL      AR COLLECTION VENOUS BLOOD,VENIPUNCTURE   3. Essential hypertension I10 401.9    4. 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 I asked Jewell Mendenhall if she has any questions and I answered the questions. Addie Mendenhall states that she understands the treatment plan and agrees with the treatment plan

## 2017-08-10 ENCOUNTER — HOSPITAL ENCOUNTER (OUTPATIENT)
Dept: BONE DENSITY | Age: 82
Discharge: HOME | End: 2017-08-10
Attending: INTERNAL MEDICINE
Payer: MEDICARE

## 2017-08-10 ENCOUNTER — DOCUMENTATION ONLY (OUTPATIENT)
Dept: INTERNAL MEDICINE CLINIC | Age: 82
End: 2017-08-10

## 2017-08-10 DIAGNOSIS — Z78.0 MENOPAUSE: ICD-10-CM

## 2017-08-10 PROCEDURE — 77080 DXA BONE DENSITY AXIAL: CPT

## 2017-08-14 ENCOUNTER — TELEPHONE (OUTPATIENT)
Dept: INTERNAL MEDICINE CLINIC | Age: 82
End: 2017-08-14

## 2017-08-14 NOTE — TELEPHONE ENCOUNTER
Called and spoke with patient. Gave BMD results with understanding voiced. Patient also requests that refill on losartan 100 mg once daily be sent in to [facility] closer to October. She will return call to the office at that time to give reminder.

## 2017-08-14 NOTE — TELEPHONE ENCOUNTER
Patient called and wanted results of labs and bone density. Also Losartan was sent to [facility] but it was the incorrect prescription. She received it and had to pay for it. The one sent has the hydrochlorothiazide in it. She thinks she still has a refill of the Losartan that is good until September. Please call her at [phone].

## 2017-08-17 ENCOUNTER — TELEPHONE (OUTPATIENT)
Dept: INTERNAL MEDICINE CLINIC | Age: 82
End: 2017-08-17

## 2017-08-17 NOTE — TELEPHONE ENCOUNTER
----- Message from Brenda Downs NP sent at 8/17/2017  4:48 PM EDT -----  Please let patient know the xray results are ok

## 2017-08-17 NOTE — TELEPHONE ENCOUNTER
Contacted Bert Eisenberg and informed Her of Ultrasound results per Michelle Vegas NP's request. Bert Eisenberg expressed understanding.

## 2017-08-24 DIAGNOSIS — M10.00 IDIOPATHIC GOUT, UNSPECIFIED CHRONICITY, UNSPECIFIED SITE: ICD-10-CM

## 2017-08-24 RX ORDER — RANITIDINE 300 MG/1
300 TABLET ORAL DAILY
Qty: 90 TAB | Refills: 3 | Status: SHIPPED | OUTPATIENT
Start: 2017-08-24

## 2017-08-24 RX ORDER — LEVOTHYROXINE SODIUM 100 UG/1
100 TABLET ORAL
Qty: 90 TAB | Refills: 3 | Status: SHIPPED | OUTPATIENT
Start: 2017-08-24

## 2017-08-24 RX ORDER — SIMVASTATIN 20 MG/1
20 TABLET, FILM COATED ORAL
Qty: 90 TAB | Refills: 3 | Status: SHIPPED | OUTPATIENT
Start: 2017-08-24 | End: 2018-10-29

## 2017-08-24 RX ORDER — ALLOPURINOL 100 MG/1
TABLET ORAL
Qty: 45 TAB | Refills: 3 | Status: SHIPPED | OUTPATIENT
Start: 2017-08-24 | End: 2018-03-18 | Stop reason: ALTCHOICE

## 2017-08-24 RX ORDER — METOPROLOL TARTRATE 50 MG/1
50 TABLET ORAL 2 TIMES DAILY
Qty: 180 TAB | Refills: 3 | Status: SHIPPED | OUTPATIENT
Start: 2017-08-24 | End: 2018-09-18 | Stop reason: SDUPTHER

## 2017-08-24 RX ORDER — FUROSEMIDE 20 MG/1
TABLET ORAL
Qty: 90 TAB | Refills: 3 | Status: SHIPPED | OUTPATIENT
Start: 2017-08-24 | End: 2018-05-17 | Stop reason: SDUPTHER

## 2017-08-24 RX ORDER — DOXEPIN HYDROCHLORIDE 25 MG/1
25 CAPSULE ORAL
Qty: 90 CAP | Refills: 3 | Status: SHIPPED | OUTPATIENT
Start: 2017-08-24

## 2017-08-24 NOTE — TELEPHONE ENCOUNTER
Requested Prescriptions     Pending Prescriptions Disp Refills    allopurinol (ZYLOPRIM) 100 mg tablet 30 Tab 0    simvastatin (ZOCOR) 20 mg tablet 90 Tab 3     Sig: Take 1 Tab by mouth nightly.  raNITIdine (ZANTAC) 300 mg tablet 90 Tab 3     Sig: Take 1 Tab by mouth daily.  doxepin (SINEQUAN) 25 mg capsule 90 Cap 3     Sig: Take 1 Cap by mouth nightly.  metoprolol tartrate (LOPRESSOR) 50 mg tablet 180 Tab 3     Sig: Take 1 Tab by mouth two (2) times a day.  levothyroxine (SYNTHROID) 100 mcg tablet 90 Tab 3     Sig: Take 1 Tab by mouth nightly.     furosemide (LASIX) 20 mg tablet 90 Tab 3     Si tablet each AM      Needs all to go to [facility]

## 2017-09-07 ENCOUNTER — OFFICE VISIT (OUTPATIENT)
Dept: VASCULAR SURGERY | Age: 82
End: 2017-09-07

## 2017-09-07 VITALS
WEIGHT: 147 LBS | SYSTOLIC BLOOD PRESSURE: 150 MMHG | RESPIRATION RATE: 18 BRPM | HEIGHT: 61 IN | BODY MASS INDEX: 27.75 KG/M2 | DIASTOLIC BLOOD PRESSURE: 72 MMHG | HEART RATE: 64 BPM

## 2017-09-07 DIAGNOSIS — I87.2 SAPHENOFEMORAL VENOUS REFLUX: ICD-10-CM

## 2017-09-07 DIAGNOSIS — R60.0 LEG EDEMA, RIGHT: Primary | ICD-10-CM

## 2017-09-07 DIAGNOSIS — I82.441 ACUTE DVT OF RIGHT TIBIAL VEIN (HCC): ICD-10-CM

## 2017-09-07 DIAGNOSIS — R60.0 LEG EDEMA, RIGHT: ICD-10-CM

## 2017-09-07 RX ORDER — LOSARTAN POTASSIUM 100 MG/1
100 TABLET ORAL DAILY
COMMUNITY
End: 2017-09-25 | Stop reason: SDUPTHER

## 2017-09-07 NOTE — PROGRESS NOTES
Lc Billingsley    Chief Complaint   Patient presents with    New Patient    Swelling       History and Physical    Ms Richard Mena is referred to us for right leg swelling. She has had swelling on and off for a number of years. She in fact had a negative DVT study done a little over a year ago when she was having problems with swelling. But she has had more persistent swelling now for the past month. She is described that her swelling will typically go down at night and then recurs throughout the day. But this current swelling is one that does not really change much with position. She does have compression, but confesses she does not really wear it. She will elevate but again it is not helping. She does not really have any pain. She does have some varicose veins. The left leg does have some veins, she has not had a swelling issues in that leg. No symptoms of claudication or rest pain. No sores or ulcerations.     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 Heath Kwok Insomnia     Lichen plano-pilaris     Mass of breast, right 1976    benign    Mass of breast, right 1994    benign    Pure hyperglyceridemia     Thyroid disease      Patient Active Problem List   Diagnosis Code    Insomnia G47.00    Essential hypertension I10    Hyperlipidemia E78.5    Idiopathic gout M10.00    PMR (polymyalgia rheumatica) ([address] M35.3     Past Surgical History:   Procedure Laterality Date    HX APPENDECTOMY      HX CATARACT REMOVAL Bilateral 2010    HX CHOLECYSTECTOMY      HX HEMORRHOIDECTOMY      HX HERNIA REPAIR  2004    small abdominal repair and fistula    HX HYSTERECTOMY  1991    cystocele  HX LYMPHADENECTOMY Right 7/2001    right axilla    HX MASTECTOMY Right 2001    with reconstruction    HX MASTECTOMY Left 1/14    HX POLYPECTOMY      [id number] muliple removals    HX RECTOCELE REPAIR  2003     Current Outpatient Prescriptions   Medication Sig Dispense Refill    losartan (COZAAR) 100 mg tablet Take 100 mg by mouth daily.  apixaban (ELIQUIS) 2.5 mg tablet Take 1 Tab by mouth two (2) times a day. 60 Tab 0    allopurinol (ZYLOPRIM) 100 mg tablet TAKE 1/2 TABLET BY MOUTH DAILY 45 Tab 3    simvastatin (ZOCOR) 20 mg tablet Take 1 Tab by mouth nightly. 90 Tab 3    raNITIdine (ZANTAC) 300 mg tablet Take 1 Tab by mouth daily. 90 Tab 3    doxepin (SINEQUAN) 25 mg capsule Take 1 Cap by mouth nightly. 90 Cap 3    metoprolol tartrate (LOPRESSOR) 50 mg tablet Take 1 Tab by mouth two (2) times a day. 180 Tab 3    levothyroxine (SYNTHROID) 100 mcg tablet Take 1 Tab by mouth nightly. 90 Tab 3    furosemide (LASIX) 20 mg tablet 1 tablet each AM 90 Tab 3    predniSONE (DELTASONE) 5 mg tablet Take 15mg (three tablets) daily for three weeks and then 12mg (2!/2 tablets) for three weeks. 116 Tab 0    hydrALAZINE (APRESOLINE) 25 mg tablet TAKE 1 TABLET BY MOUTH TWICE DAILY. STOP VERAPAMIL 180 Tab 2    guanFACINE IR (TENEX) 1 mg IR tablet TAKE 1/2 TABLET BY MOUTH EVERY DAY. 45 Tab 3    predniSONE (DELTASONE) 5 mg tablet 2 tablets by mouth daily (Patient taking differently: Take 3 tablets daily for 10 days then 2 tablets by mouth daily) 60 Tab 1    peg 400-propylene glycol (SYSTANE, PROPYLENE GLYCOL,) 0.4-0.3 % drop Administer  to left eye as needed.  GLUCOSAMINE HCL/CHONDR LEGER A NA (OSTEO BI-FLEX PO) Take  by mouth.  cyanocobalamin 1,000 mcg tablet Take 1,000 mcg by mouth daily.  aspirin delayed-release 81 mg tablet Take  by mouth daily.  folic acid 080 mcg tablet Take 800 mcg by mouth daily.  CALCIUM PHOSPHATE TRIB/VIT D3 (CITRACAL + D PO) Take  by mouth daily.       multivitamins-minerals-lutein (CENTRUM SILVER) tab tablet Take 1 Tab by mouth daily.  loratadine (CLARITIN) 10 mg tablet Take 10 mg by mouth daily.  tamoxifen (NOLVADEX) 20 mg tablet Take 20 mg by mouth daily.  clobetasol (OLUX) 0.05 % topical foam Apply  to affected area two (2) times daily as needed for Skin Irritation. use thin film on affected area       Allergies   Allergen Reactions    Codeine Hives    Demerol [Meperidine] Nausea Only    Levaquin [Levofloxacin] Other (comments)     Swelling in feet and legs     Social History     Social History    Marital status:      Spouse name: N/A    Number of children: N/A    Years of education: N/A     Occupational History    Not on file. Social History Main Topics    Smoking status: Former Smoker     Packs/day: 2.00     Years: 9.00     Quit date: 1/1/1963    Smokeless tobacco: Never Used    Alcohol use 0.5 oz/week     1 Standard drinks or equivalent per week      Comment: occasionally    Drug use: No    Sexual activity: Not Currently     Other Topics Concern    Not on file     Social History Narrative      Family History   Problem Relation Age of Onset    Hypertension Mother     COPD Mother     Heart Disease Father        Review of Systems    Review of Systems - History obtained from the patient  General ROS: negative  Psychological ROS: negative  Ophthalmic ROS: negative  Respiratory ROS: negative  Cardiovascular ROS: negative  Gastrointestinal ROS: negative  Musculoskeletal ROS: positive for - joint pain, joint stiffness and joint swelling  Neurological ROS: negative  Dermatological ROS: negative  Vascular ROS: right leg edema        Physical Exam:    Visit Vitals    /72 (BP 1 Location: Left arm, BP Patient Position: Sitting)    Pulse 64    Resp 18    Ht 5' 1\" (1.549 m)    Wt 147 lb (66.7 kg)    BMI 27.78 kg/m2      General:  Alert, cooperative, no distress.    Head:  Normocephalic, without obvious abnormality, atraumatic. Eyes:    Conjunctivae/corneas clear. Pupils equal, round, reactive to light. Extraocular movements intact. Extremities: 2-3 + right leg edema, trace edema of left. Small varicosities and reticular veins   Pulses: Multiphasic with doppler and no signs of arterial disease   Skin: No cellulitis or ulcerations notable      Vascular studies:  PVL at [facility] 7/2016  No evidence of deep venous thrombosis in the right lower extremity. Venous valvular reflux in the bilateral common femoral, right femoral and right great saphenous veins. Right leg :  1. Acute occlusive deep venous thrombosis identified in 1 of 2  gastrocnemius veins and 1 of 2 peroneal veins. Tibial and peroneal  veins are difficult to visualized secondary to edema. Cannot exclude  non occlusive thormbus in the posterior tibial veins. 2. Incompetent deep venous system with reflux involving the common  femoral and proximal femoral veins. 3. Incompetent great saphenous vein with reflux in the sapheno-femoral   junction, proximal thigh and calf. 4. Small saphenous vein is patent without evidence of reflux or  thrombus. 5. 1 of 2 gastrocnemius veins of the second pair is noncompressible  with reduced flow and appears to be a more chronic process. 6. Muliphasic tibial doppler signal at rest.  7. Patent contralateral common femoral vein without evidence of  thrombus. There is 0.9 seconds of reflux in the CFV. Incidental finding:  fluid collection at the anterior knee and  signficant edema from knee to ankle. Impression and Plan:  1. Leg edema, right    2. Saphenofemoral venous reflux    3. Acute DVT of right tibial vein (HCC)      Orders Placed This Encounter    DUPLEX LOWER EXT VENOUS RIGHT AMB (Reflux)    DUPLEX LOWER EXT VENOUS RIGHT AMB    losartan (COZAAR) 100 mg tablet    apixaban (ELIQUIS) 2.5 mg tablet       I discussed the pathology of venous disease including venous reflux.   I gave her some literature to review as well. I did explain the importance of the combination of compression, elevation, range of motion exercises, and skin care, all which helped to control symptoms. But I would suggest going ahead and getting a reflux study. The PVL study she had about a year ago did give some indication that she had some valvular reflux, so I think this is something that we can build on perhaps offer some treatment. Following that study we will see her back for review of results and recommendations. As an addendum, she was actually able to have her study the same day. Incidentally she had acute tibial vein thrombus found. I still encouraged to continue with all the conservative therapies that we discussed above. I also stated we could still potentially reevaluate her for the treatment of reflux which we also found today. But we do need to treat the thrombus, and elected to do 2.5 mg Eliquis twice daily. Given a 30 day coupon card. There are times that tibial clots do resolve with just that short-term of treatment. So we will plan on getting a follow-up ultrasound prior to the completion of this first 30 day supply. I cautioned her to monitor for signs of bleeding and to let us know if any problems. And we will see her in 1 month. ANNIA Vera    Portions of this note have been created using voice recognition software.

## 2017-09-07 NOTE — MR AVS SNAPSHOT
Visit Information Date & Time Provider Department Dept. Phone Encounter #  
 9/7/2017 10:30 AM Paige Meneses, 82 [facility] [phone] Your Appointments 9/7/2017 12:45 PM  
PROCEDURE with [facility] 2 [facility] ([address]) Appt Note: lashae reflux [place] [address] [facility] Vicki Rosibel 028 200 [facility]  
[phone] 23[address] [facility] 47 [facility]  
  
    
 11/9/2017  8:15 AM  
Follow Up with Brooklyn Barraza MD  
[facility] [place] [address]) Appt Note: 3 mo f/u  
 [address], [address] [place] [address]  
  
   
 [address], [address] [facility] [address] Upcoming Health Maintenance Date Due  
 MEDICARE YEARLY EXAM 8/23/1997 INFLUENZA AGE 9 TO ADULT 8/1/2017 GLAUCOMA SCREENING Q2Y 3/1/2018 DTaP/Tdap/Td series (2 - Td) 10/20/2026 Allergies as of 9/7/2017  Review Complete On: 8/9/2017 By: Brooklyn Barraza MD  
  
 Severity Noted Reaction Type Reactions Codeine  08/04/2014    Hives Demerol [Meperidine]  08/04/2014    Nausea Only Levaquin [Levofloxacin]  08/04/2014    Other (comments) Swelling in feet and legs Current Immunizations  Reviewed on 6/26/2017 Name Date Influenza High Dose Vaccine PF 10/13/2016  8:53 AM  
 Influenza Vaccine 10/6/2015 Pneumococcal Conjugate (PCV-13) 10/6/2015 Pneumococcal Polysaccharide (PPSV-23) 4/9/2010, 4/11/2003, 9/9/1993 Td 7/30/2008, 9/9/1993 Tdap 10/20/2016  8:40 AM  
 Zoster Vaccine, Live 1/14/2009 Not reviewed this visit You Were Diagnosed With   
  
 Codes Comments Leg edema, right    -  Primary ICD-10-CM: R60.0 ICD-9-CM: 782.3 Saphenofemoral venous reflux     ICD-10-CM: I87.2 ICD-9-CM: 459.81 Vitals BP Pulse Resp Height(growth percentile) Weight(growth percentile) BMI 150/72 (BP 1 Location: Left arm, BP Patient Position: Sitting) 64 18 5' 1\" (1.549 m) 147 lb (66.7 kg) 27.78 kg/m2 OB Status Smoking Status Hysterectomy Former Smoker Vitals History BMI and BSA Data Body Mass Index Body Surface Area  
 27.78 kg/m 2 1.69 m 2 Preferred Pharmacy Pharmacy Name Phone [address] [facility], [address] [facility] [address] [place] Your Updated Medication List  
  
   
This list is accurate as of: 9/7/17 11:33 AM.  Always use your most recent med list.  
  
  
  
  
 allopurinol 100 mg tablet Commonly known as:  ZYLOPRIM  
TAKE 1/2 TABLET BY MOUTH DAILY  
  
 aspirin delayed-release 81 mg tablet Take  by mouth daily. CENTRUM SILVER Tab tablet Generic drug:  multivitamins-minerals-lutein Take 1 Tab by mouth daily. CITRACAL + D PO Take  by mouth daily. clobetasol 0.05 % topical foam  
Commonly known as:  OLUX Apply  to affected area two (2) times daily as needed for Skin Irritation. use thin film on affected area  
  
 cyanocobalamin 1,000 mcg tablet Take 1,000 mcg by mouth daily. doxepin 25 mg capsule Commonly known as:  SINEquan Take 1 Cap by mouth nightly. folic acid 228 mcg tablet Take 800 mcg by mouth daily. furosemide 20 mg tablet Commonly known as:  LASIX  
1 tablet each AM  
  
 guanFACINE IR 1 mg IR tablet Commonly known as:  TENEX  
TAKE 1/2 TABLET BY MOUTH EVERY DAY. hydrALAZINE 25 mg tablet Commonly known as:  APRESOLINE  
TAKE 1 TABLET BY MOUTH TWICE DAILY. STOP VERAPAMIL  
  
 levothyroxine 100 mcg tablet Commonly known as:  SYNTHROID Take 1 Tab by mouth nightly. loratadine 10 mg tablet Commonly known as:  Kathlee Bird Take 10 mg by mouth daily. losartan 100 mg tablet Commonly known as:  COZAAR Take 100 mg by mouth daily. metoprolol tartrate 50 mg tablet Commonly known as:  LOPRESSOR  
 Take 1 Tab by mouth two (2) times a day. OSTEO BI-FLEX PO Take  by mouth. * predniSONE 5 mg tablet Commonly known as:  DELTASONE  
2 tablets by mouth daily * predniSONE 5 mg tablet Commonly known as:  [place] Take 15mg (three tablets) daily for three weeks and then 12mg (2!/2 tablets) for three weeks. raNITIdine 300 mg tablet Commonly known as:  ZANTAC Take 1 Tab by mouth daily. simvastatin 20 mg tablet Commonly known as:  ZOCOR Take 1 Tab by mouth nightly. SYSTANE (PROPYLENE GLYCOL) 0.4-0.3 % Drop Generic drug:  peg 400-propylene glycol Administer  to left eye as needed. tamoxifen 20 mg tablet Commonly known as:  NOLVADEX Take 20 mg by mouth daily. * Notice: This list has 2 medication(s) that are the same as other medications prescribed for you. Read the directions carefully, and ask your doctor or other care provider to review them with you. To-Do List   
 09/18/2017 Imaging:  DUPLEX LOWER EXT VENOUS RIGHT AMB   
  
 10/23/2017 9:30 AM  
  Appointment with [facility] CT RM 1 at [facility] RAD CT ([phone]) GENERAL INSTRUCTIONS  This study does not require you to drink contrast prior to your study. RELATED STUDY INFORMATION  Bring any films, CDs, and reports related with you on the day of your exam.  This only includes studies done outside of [address] [place] [address], Hospitals in [place], [place], and [place]. QUESTIONS  Notify the CT Department if you have any questions concerning your study. [place] - [phone] [facility] - [phone] Introducing [facility] & HEALTH SERVICES! [employer] introduces [employer] patient portal. Now you can access parts of your medical record, email your doctor's office, and request medication refills online. 1. In your internet browser, go to [email or web address] 2. Click on the First Time User? Click Here link in the Sign In box.  You will see the New Member Sign Up page. 3. Enter your [employer] Access Code exactly as it appears below. You will not need to use this code after youve completed the sign-up process. If you do not sign up before the expiration date, you must request a new code. · Tel[employer] Access Code: [email or web address] Expires: 11/5/2017  1:11 PM 
 
4. Enter the last four digits of your Social Security Number (xxxx) and Date of Birth (mm/dd/yyyy) as indicated and click Submit. You will be taken to the next sign-up page. 5. Create a Telinet ID. This will be your Telinet login ID and cannot be changed, so think of one that is secure and easy to remember. 6. Create a [employer] password. You can change your password at any time. 7. Enter your Password Reset Question and Answer. This can be used at a later time if you forget your password. 8. Enter your e-mail address. You will receive e-mail notification when new information is available in [address] 9. Click Sign Up. You can now view and download portions of your medical record. 10. Click the Download Summary menu link to download a portable copy of your medical information. If you have questions, please visit the Frequently Asked Questions section of the [employer] website. Remember, [employer] is NOT to be used for urgent needs. For medical emergencies, dial 911. Now available from your iPhone and Android! Please provide this summary of care documentation to your next provider. Your primary care clinician is listed as Keara Jett. If you have any questions after today's visit, please call [phone].

## 2017-09-07 NOTE — PROCEDURES
[facility]   *** FINAL REPORT ***    Name: Esperanza Jarvis  MRN: [id number]       Outpatient  : 23 Aug 1932  HIS Order #: [id number]  [address] [facility] Visit #: [id number]  Date: 07 Sep 2017    TYPE OF TEST: Peripheral Venous Testing    REASON FOR TEST  Limb swelling    Right Leg:-  Deep venous thrombosis:           Yes  Proximal extent of thrombus:      Peroneal  Superficial venous thrombosis:    No  Deep venous insufficiency:        Yes  Superficial venous insufficiency: Yes    Abnormal Valve Closure Times (seconds):    Right Common Femoral: 2.9    Right Femoral:        1.3    Right SFJ:    Right GSV proximal:    Right GSV distal:    Vein Mapping:    Diam.   Depth  (mm)    Right Great Saphenous Vein:    High Thigh:     10.0    Mid Thigh:       4.3    Low Thigh:       4.7    Knee:            5.0    High Calf:       4.1    Low Calf: Ankle:    Right Small Saphenous Vein:    SPJ:    Mid Calf: Ankle:    [place]:  Accessory saph.:  Amaro :  Small :      INTERPRETATION/FINDINGS  Duplex images were obtained using 2-D gray scale, color flow and  spectral doppler analysis. Right leg :  1. Acute occlusive deep venous thrombosis identified in 1 of 2  gastrocnemius veins and 1 of 2 peroneal veins. Tibial and peroneal  veins are difficult to visualized secondary to edema. Cannot exclude  non occlusive thormbus in the posterior tibial veins. 2. Incompetent deep venous system with reflux involving the common  femoral and proximal femoral veins. 3. Incompetent great saphenous vein with reflux in the sapheno-femoral   junction, proximal thigh and calf. 4. Small saphenous vein is patent without evidence of reflux or  thrombus. 5. 1 of 2 gastrocnemius veins of the second pair is noncompressible  with reduced flow and appears to be a more chronic process. 6. Muliphasic tibial doppler signal at rest.  7. Patent contralateral common femoral vein without evidence of  thrombus.  There is 0.9 seconds of reflux in the CFV. Incidental finding:  fluid collection at the anterior knee and  signficant edema from knee to ankle. ADDITIONAL COMMENTS  ANNIA Rubin given verbal report. I have personally reviewed the data relevant to the interpretation of  this  study. TECHNOLOGIST: Mateo Moya RDMS  Signed: 09/07/2017 02:14 PM    PHYSICIAN: Brisa Brath D.O.   Signed: 09/08/2017 09:21 AM

## 2017-09-25 ENCOUNTER — OFFICE VISIT (OUTPATIENT)
Dept: INTERNAL MEDICINE CLINIC | Age: 82
End: 2017-09-25

## 2017-09-25 VITALS
TEMPERATURE: 98 F | HEART RATE: 65 BPM | SYSTOLIC BLOOD PRESSURE: 142 MMHG | HEIGHT: 61 IN | RESPIRATION RATE: 16 BRPM | WEIGHT: 149 LBS | OXYGEN SATURATION: 98 % | BODY MASS INDEX: 28.13 KG/M2 | DIASTOLIC BLOOD PRESSURE: 80 MMHG

## 2017-09-25 DIAGNOSIS — I10 ESSENTIAL HYPERTENSION: ICD-10-CM

## 2017-09-25 DIAGNOSIS — Z23 ENCOUNTER FOR IMMUNIZATION: ICD-10-CM

## 2017-09-25 DIAGNOSIS — Z13.31 SCREENING FOR DEPRESSION: ICD-10-CM

## 2017-09-25 DIAGNOSIS — R60.0 LEG EDEMA, RIGHT: ICD-10-CM

## 2017-09-25 DIAGNOSIS — Z00.00 MEDICARE ANNUAL WELLNESS VISIT, SUBSEQUENT: Primary | ICD-10-CM

## 2017-09-25 DIAGNOSIS — I87.2 SAPHENOFEMORAL VENOUS REFLUX: ICD-10-CM

## 2017-09-25 DIAGNOSIS — Z13.39 SCREENING FOR ALCOHOLISM: ICD-10-CM

## 2017-09-25 DIAGNOSIS — I82.431 ACUTE DEEP VEIN THROMBOSIS (DVT) OF POPLITEAL VEIN OF RIGHT LOWER EXTREMITY (HCC): ICD-10-CM

## 2017-09-25 RX ORDER — PREDNISONE 5 MG/1
TABLET ORAL
COMMUNITY
End: 2018-10-29

## 2017-09-25 RX ORDER — LOSARTAN POTASSIUM 100 MG/1
100 TABLET ORAL DAILY
Qty: 90 TAB | Refills: 3 | Status: SHIPPED | OUTPATIENT
Start: 2017-09-25 | End: 2018-07-05 | Stop reason: SDUPTHER

## 2017-09-25 RX ORDER — UREA 10 %
400 LOTION (ML) TOPICAL DAILY
COMMUNITY

## 2017-09-25 NOTE — PROGRESS NOTES
1. Have you been to the ER, urgent care clinic since your last visit? Hospitalized since your last visit? No    2. Have you seen or consulted any other health care providers outside of the [address] [place] [address] since your last visit? Include any pap smears or colon screening.  No

## 2017-09-25 NOTE — PROGRESS NOTES
The patient presents to the office today with the chief complaint of hypertension    HPI    The patient remains on medications for hypertension. she is tolerating the medications well. The patient is also on a statin for hyperlipidemia. The patient has continue complaints of swelling in her right leg secondary to DVT. The patient is on Eliquis. She is tolerating the medications well. The patient remains on Tamoxifen due to breast cancer. There is no evidence of reoccurrence. .      Review of Systems   Respiratory: Negative for shortness of breath. Cardiovascular: Positive for leg swelling. Negative for chest pain. Allergies   Allergen Reactions    Codeine Hives    Demerol [Meperidine] Nausea Only    Levaquin [Levofloxacin] Other (comments)     Swelling in feet and legs       Current Outpatient Prescriptions   Medication Sig Dispense Refill    predniSONE (DELTASONE) 5 mg tablet Take 2 tablets (10 mg) by mouth once daily. Decrease to 1 1/2 tablets (7.5 mg) by mouth once daily on October 15, 2017. On 11/7/2017, dose will be further reduced to 1 tablet (5 mg) by mouth once daily as directed by Dr. Hany Spencer.  CALCIUM CITRATE/VITAMIN D3 (CITRACAL + D MAXIMUM PO) Take 2 Tabs by mouth daily.  folic acid 703 mcg tablet Take 800 mcg by mouth daily.  losartan (COZAAR) 100 mg tablet Take 1 Tab by mouth daily. 90 Tab 3    apixaban (ELIQUIS) 2.5 mg tablet Take 1 Tab by mouth two (2) times a day. 60 Tab 0    allopurinol (ZYLOPRIM) 100 mg tablet TAKE 1/2 TABLET BY MOUTH DAILY 45 Tab 3    simvastatin (ZOCOR) 20 mg tablet Take 1 Tab by mouth nightly. 90 Tab 3    raNITIdine (ZANTAC) 300 mg tablet Take 1 Tab by mouth daily. 90 Tab 3    doxepin (SINEQUAN) 25 mg capsule Take 1 Cap by mouth nightly. 90 Cap 3    metoprolol tartrate (LOPRESSOR) 50 mg tablet Take 1 Tab by mouth two (2) times a day. 180 Tab 3    levothyroxine (SYNTHROID) 100 mcg tablet Take 1 Tab by mouth nightly.  90 Tab 3    furosemide (LASIX) 20 mg tablet 1 tablet each AM 90 Tab 3    guanFACINE IR (TENEX) 1 mg IR tablet TAKE 1/2 TABLET BY MOUTH EVERY DAY. 45 Tab 3    peg 400-propylene glycol (SYSTANE, PROPYLENE GLYCOL,) 0.4-0.3 % drop Administer 1 Drop to both eyes as needed.  GLUCOSAMINE HCL/CHONDR LEGER A NA (OSTEO BI-FLEX PO) Take 2 Tabs by mouth daily.  cyanocobalamin 1,000 mcg tablet Take 1,000 mcg by mouth daily.  multivitamins-minerals-lutein (CENTRUM SILVER) tab tablet Take 1 Tab by mouth daily.  loratadine (CLARITIN) 10 mg tablet Take 10 mg by mouth daily.  tamoxifen (NOLVADEX) 20 mg tablet Take 20 mg by mouth daily.  clobetasol (OLUX) 0.05 % topical foam Apply  to affected area two (2) times daily as needed for Skin Irritation.  use thin film on affected area         Past Medical History:   Diagnosis Date    Autoimmune disease ([address]     Breast cancer ([address] 2001, 1/ 2014    right, left    Cancer ([address]     Colon polyps     Elevated cholesterol     Enlarged lymph nodes     pericardium    GERD (gastroesophageal reflux disease)     Hypertension     Ill-defined condition 2007 to 2010    lung-ground glass followed by Dr. Foreign Fitzgerald of breast, right 1976    benign    Mass of breast, right 1994    benign    Pure hyperglyceridemia     Thyroid disease        Past Surgical History:   Procedure Laterality Date    HX APPENDECTOMY      HX CATARACT REMOVAL Bilateral 2010    HX CHOLECYSTECTOMY      HX HEMORRHOIDECTOMY      HX HERNIA REPAIR  2004    small abdominal repair and fistula    HX HYSTERECTOMY  1991    cystocele    HX LYMPHADENECTOMY Right 7/2001    right axilla    HX MASTECTOMY Right 2001    with reconstruction    HX MASTECTOMY Left 1/14    HX POLYPECTOMY      [id number] muliple removals    HX RECTOCELE REPAIR  2003       Social History     Social History    Marital status:      Spouse name: N/A    Number of children: N/A    Years of education: N/A     Occupational History    Not on file. Social History Main Topics    Smoking status: Former Smoker     Packs/day: 2.00     Years: 9.00     Quit date: 1/1/1963    Smokeless tobacco: Never Used    Alcohol use 0.5 oz/week     1 Standard drinks or equivalent per week      Comment: rare    Drug use: No    Sexual activity: Not Currently     Other Topics Concern    Not on file     Social History Narrative       Patient does have an advanced directive on file    Visit Vitals    /80 (BP 1 Location: Left arm, BP Patient Position: Sitting)    Pulse 65    Temp 98 °F (36.7 °C) (Tympanic)    Resp 16    Ht 5' 1\" (1.549 m)    Wt 149 lb (67.6 kg)    SpO2 98%    BMI 28.15 kg/m2       Physical Exam   Cardiovascular: Normal rate and regular rhythm. Exam reveals no gallop. No murmur heard. Pulmonary/Chest: She has no wheezes. She has no rales. Abdominal: Soft. She exhibits no distension. Musculoskeletal: She exhibits edema (2+ edema in right leg with varicose veins).        BMI:  [facility] Outpatient Visit on 08/07/2017   Component Date Value Ref Range Status    C-Reactive protein 08/07/2017 1.4* 0 - 0.3 mg/dL Final    Sed rate, automated 08/07/2017 56* 0 - 30 mm/hr Final   Hospital Outpatient Visit on 07/07/2017   Component Date Value Ref Range Status    Sodium 07/07/2017 135* 136 - 145 mmol/L Final    Potassium 07/07/2017 4.1  3.5 - 5.5 mmol/L Final    Chloride 07/07/2017 99* 100 - 108 mmol/L Final    CO2 07/07/2017 27  21 - 32 mmol/L Final    Anion gap 07/07/2017 9  3.0 - 18 mmol/L Final    Glucose 07/07/2017 116* 74 - 99 mg/dL Final    BUN 07/07/2017 41* 7.0 - 18 MG/DL Final    Creatinine 07/07/2017 1.30  0.6 - 1.3 MG/DL Final    BUN/Creatinine ratio 07/07/2017 32* 12 - 20   Final    GFR est AA 07/07/2017 47* >60 ml/min/1.73m2 Final    GFR est non-AA 07/07/2017 39* >60 ml/min/1.73m2 Final    Comment: (NOTE)  Estimated GFR is calculated using the Modification of Diet in Renal   Disease (MDRD) Study equation, reported for both  Americans   (GFRAA) and non- Americans (GFRNA), and normalized to 1.73m2   body surface area. The physician must decide which value applies to   the patient. The MDRD study equation should only be used in   individuals age 25 or older. It has not been validated for the   following: pregnant women, patients with serious comorbid conditions,   or on certain medications, or persons with extremes of body size,   muscle mass, or nutritional status.  Calcium 07/07/2017 8.7  8.5 - 10.1 MG/DL Final    Bilirubin, total 07/07/2017 0.4  0.2 - 1.0 MG/DL Final    ALT (SGPT) 07/07/2017 31  13 - 56 U/L Final    AST (SGOT) 07/07/2017 22  15 - 37 U/L Final    Alk. phosphatase 07/07/2017 38* 45 - 117 U/L Final    Protein, total 07/07/2017 6.8  6.4 - 8.2 g/dL Final    Albumin 07/07/2017 3.3* 3.4 - 5.0 g/dL Final    Globulin 07/07/2017 3.5  2.0 - 4.0 g/dL Final    A-G Ratio 07/07/2017 0.9  0.8 - 1.7   Final    WBC 07/07/2017 10.8  4.6 - 13.2 K/uL Final    RBC 07/07/2017 3.72* 4.20 - 5.30 M/uL Final    HGB 07/07/2017 11.3* 12.0 - 16.0 g/dL Final    HCT 07/07/2017 34.8* 35.0 - 45.0 % Final    MCV 07/07/2017 93.5  74.0 - 97.0 FL Final    MCH 07/07/2017 30.4  24.0 - 34.0 PG Final    MCHC 07/07/2017 32.5  31.0 - 37.0 g/dL Final    RDW 07/07/2017 14.8* 11.6 - 14.5 % Final    PLATELET 42/74/2725 176  135 - 420 K/uL Final    MPV 07/07/2017 9.3  9.2 - 11.8 FL Final    NEUTROPHILS 07/07/2017 83* 40 - 73 % Final    LYMPHOCYTES 07/07/2017 6* 21 - 52 % Final    MONOCYTES 07/07/2017 6  3 - 10 % Final    EOSINOPHILS 07/07/2017 5  0 - 5 % Final    BASOPHILS 07/07/2017 0  0 - 2 % Final    ABS. NEUTROPHILS 07/07/2017 9.0* 1.8 - 8.0 K/UL Final    ABS. LYMPHOCYTES 07/07/2017 0.6* 0.9 - 3.6 K/UL Final    ABS. MONOCYTES 07/07/2017 0.7  0.05 - 1.2 K/UL Final    ABS. EOSINOPHILS 07/07/2017 0.6* 0.0 - 0.4 K/UL Final    ABS.  BASOPHILS 07/07/2017 0.0  0.0 - 0.06 K/UL Final    DF 07/07/2017 AUTOMATED    Final    TSH 07/07/2017 0.91  0.36 - 3.74 uIU/mL Final   Office Visit on 07/07/2017   Component Date Value Ref Range Status    Color (UA POC) 07/07/2017 Yellow   Final    Clarity (UA POC) 07/07/2017 Clear   Final    Glucose (UA POC) 07/07/2017 Negative  Negative Final    Bilirubin (UA POC) 07/07/2017 Negative  Negative Final    Ketones (UA POC) 07/07/2017 Negative  Negative Final    Specific gravity (UA POC) 07/07/2017 1.010  1.001 - 1.035 Final    Blood (UA POC) 07/07/2017 Negative  Negative Final    pH (UA POC) 07/07/2017 5.0  4.6 - 8.0 Final    Protein (UA POC) 07/07/2017 Negative  Negative mg/dL Final    Urobilinogen (UA POC) 07/07/2017 0.2 mg/dL  0.2 - 1 Final    Nitrites (UA POC) 07/07/2017 Negative  Negative Final    Leukocyte esterase (UA POC) 07/07/2017 Negative  Negative Final       .No results found for any visits on 09/25/17. Assessment / Plan      ICD-10-CM ICD-9-CM    1. Medicare annual wellness visit, subsequent Z00.00 V70.0 DEPRESSION SCREEN ANNUAL   2. Screening for alcoholism Z13.89 V79.1    3. Screening for depression Z13.89 V79.0 DEPRESSION SCREEN ANNUAL   4. Leg edema, right R60.0 782.3 losartan (COZAAR) 100 mg tablet   5. 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 Essential hypertension I10 401.9    7. Acute deep vein thrombosis (DVT) of popliteal vein of right lower extremity (HCC) I82.431 453.41    8. Encounter for immunization Z23 V03.89 INFLUENZA VIRUS VACCINE, HIGH DOSE SEASONAL, PRESERVATIVE FREE      ADMIN INFLUENZA VIRUS VAC     Flu shot given  she was advised to continue her maintenance medications  Anticipate using Eliquis while on Tamoxifen    Follow-up Disposition:  Return in about 3 months (around 12/25/2017). I asked Bladimir Marquez if she has any questions and I answered the questions. Dmitri Marquez states that she understands the treatment plan and agrees with the treatment plan

## 2017-09-25 NOTE — MR AVS SNAPSHOT
Visit Information Date & Time Provider Department Dept. Phone Encounter #  
 9/25/2017  9:45 AM Efren Rodriguez MD [facility] [place] [phone] [id number] Your Appointments 10/5/2017 11:00 AM  
PROCEDURE with [facility] ([address] [place] [address]) Appt Note: AV RT LE 1 month Zhanna Jayden[address] [facility] [address]  
[phone] [address] [place] [address]  
  
    
 10/5/2017  1:30 PM  
Follow Up with [facility] ([address] [place] [address]) Appt Note: 1 month fup after studies. SAME DAY APPT. Need to call patient for confirmation [address] [place] [address] [phone] [facility] [address]  
[phone] [address] [place] [address]  
  
    
 11/9/2017  8:15 AM  
Follow Up with Efren Rodriguez MD  
[facility] OF [place] [address] [facility]) Appt Note: 3 mo f/u  
 [address] [facility], [address]  
  
   
 [address] [facility], [address] [facility] [address] Upcoming Health Maintenance Date Due  
 MEDICARE YEARLY EXAM 8/23/1997 INFLUENZA AGE 9 TO ADULT 8/1/2017 GLAUCOMA SCREENING Q2Y 3/1/2018 DTaP/Tdap/Td series (2 - Td) 10/20/2026 Allergies as of 9/25/2017  Review Complete On: 9/25/2017 By: Frandy Montejo, PHARMD  
  
 Severity Noted Reaction Type Reactions Codeine  08/04/2014    Hives Demerol [Meperidine]  08/04/2014    Nausea Only Levaquin [Levofloxacin]  08/04/2014    Other (comments) Swelling in feet and legs Current Immunizations  Reviewed on 9/22/2017 Name Date Influenza High Dose Vaccine PF 9/25/2017, 10/13/2016  8:53 AM  
 Influenza Vaccine 10/6/2015, 9/1/2013 12:00 AM  
 Pneumococcal Conjugate (PCV-13) 10/6/2015 Pneumococcal Polysaccharide (PPSV-23) 4/9/2010, 4/11/2003, 9/9/1993 Td 7/30/2008, 9/9/1993 Tdap 10/20/2016  8:40 AM  
 Zoster Vaccine, Live 1/14/2009 Not reviewed this visit You Were Diagnosed With   
  
 Codes Comments Medicare annual wellness visit, subsequent    -  Primary ICD-10-CM: Z00.00 ICD-9-CM: V70.0 Screening for alcoholism     ICD-10-CM: Z13.89 ICD-9-CM: V79.1 Screening for depression     ICD-10-CM: Z13.89 ICD-9-CM: V79.0 Leg edema, right     ICD-10-CM: R60.0 ICD-9-CM: 782.3 Saphenofemoral venous reflux     ICD-10-CM: I87.2 ICD-9-CM: 459.81 Essential hypertension     ICD-10-CM: I10 
ICD-9-CM: 401.9 Acute deep vein thrombosis (DVT) of popliteal vein of right lower extremity (HCC)     ICD-10-CM: I16.570 ICD-9-CM: 453.41 Encounter for immunization     ICD-10-CM: L97 ICD-9-CM: V03.89 Vitals BP Pulse Temp Resp Height(growth percentile) Weight(growth percentile) 142/80 (BP 1 Location: Left arm, BP Patient Position: Sitting) 65 98 °F (36.7 °C) (Tympanic) 16 5' 1\" (1.549 m) 149 lb (67.6 kg) SpO2 BMI OB Status Smoking Status 98% 28.15 kg/m2 Hysterectomy Former Smoker Vitals History BMI and BSA Data Body Mass Index Body Surface Area  
 28.15 kg/m 2 1.71 m 2 Preferred Pharmacy Pharmacy Name Phone [address] [facility], [address] [place] [address] [address] [place] [address] Your Updated Medication List  
  
   
This list is accurate as of: 9/25/17  3:28 PM.  Always use your most recent med list.  
  
  
  
  
 allopurinol 100 mg tablet Commonly known as:  ZYLOPRIM  
TAKE 1/2 TABLET BY MOUTH DAILY  
  
 apixaban 2.5 mg tablet Commonly known as:  Janell Grayland Take 1 Tab by mouth two (2) times a day. aspirin delayed-release 81 mg tablet Take 81 mg by mouth daily. CENTRUM SILVER Tab tablet Generic drug:  multivitamins-minerals-lutein Take 1 Tab by mouth daily. CITRACAL + D MAXIMUM PO Take 2 Tabs by mouth daily.   
  
 clobetasol 0.05 % topical foam  
 Commonly known as:  OLUX Apply  to affected area two (2) times daily as needed for Skin Irritation. use thin film on affected area  
  
 cyanocobalamin 1,000 mcg tablet Take 1,000 mcg by mouth daily. doxepin 25 mg capsule Commonly known as:  SINEquan Take 1 Cap by mouth nightly. folic acid 868 mcg tablet Take 800 mcg by mouth daily. furosemide 20 mg tablet Commonly known as:  LASIX  
1 tablet each AM  
  
 guanFACINE IR 1 mg IR tablet Commonly known as:  TENEX  
TAKE 1/2 TABLET BY MOUTH EVERY DAY. levothyroxine 100 mcg tablet Commonly known as:  SYNTHROID Take 1 Tab by mouth nightly. loratadine 10 mg tablet Commonly known as:  Enedelia [place] Take 10 mg by mouth daily. losartan 100 mg tablet Commonly known as:  COZAAR Take 1 Tab by mouth daily. metoprolol tartrate 50 mg tablet Commonly known as:  LOPRESSOR Take 1 Tab by mouth two (2) times a day. OSTEO BI-FLEX PO Take 2 Tabs by mouth daily. predniSONE 5 mg tablet Commonly known as:  Maciel Noss Take 2 tablets (10 mg) by mouth once daily. Decrease to 1 1/2 tablets (7.5 mg) by mouth once daily on October 15, 2017. On 11/7/2017, dose will be further reduced to 1 tablet (5 mg) by mouth once daily as directed by Dr. Brittany Jackson. raNITIdine 300 mg tablet Commonly known as:  ZANTAC Take 1 Tab by mouth daily. simvastatin 20 mg tablet Commonly known as:  ZOCOR Take 1 Tab by mouth nightly. SYSTANE (PROPYLENE GLYCOL) 0.4-0.3 % Drop Generic drug:  peg 400-propylene glycol Administer 1 Drop to both eyes as needed. tamoxifen 20 mg tablet Commonly known as:  NOLVADEX Take 20 mg by mouth daily. Prescriptions Sent to Pharmacy Refills  
 losartan (COZAAR) 100 mg tablet 3 Sig: Take 1 Tab by mouth daily. Class: Normal  
 Pharmacy: [facility] Ph #: [phone]  Route: Oral  
  
 We Performed the Following ADMIN INFLUENZA VIRUS VAC [ [facility]] [address] [[id number] [facility]] INFLUENZA VIRUS VACCINE, HIGH DOSE SEASONAL, PRESERVATIVE FREE [50278 CPT(R)] To-Do List   
 10/23/2017 9:30 AM  
  Appointment with [facility] RM 1 at [facility] ([phone]) GENERAL INSTRUCTIONS  This study does not require you to drink contrast prior to your study. RELATED STUDY INFORMATION  Bring any films, CDs, and reports related with you on the day of your exam.  This only includes studies done outside of [address] [place] [address], \[facility]\"", [facility], and [facility]. QUESTIONS  Notify the CT Department if you have any questions concerning your study. [facility] - [phone] [facility] - [phone] Patient Instructions Medicare Wellness Visit, Female The best way to improve and maintain good health is to have a healthy lifestyle by eating a well-balanced diet, exercising regularly, limiting alcohol and stopping smoking. Regular physical exams and screening tests are another way to keep healthy. Preventive exams provided by your health care provider can find health problems before they become diseases or illnesses. Preventive services including immunizations, screening tests, monitoring and exams can help you take care of your own health. Preventive services such as immunizations prevent serious infections. All people over age 72 should have a Pneumovax and a Prevnar-13 shot to prevent potentially life threatening infections with the pneumococcus bacteria, a common cause of pneumonia. These are once in a lifetime unless you and your provider decide differently. Next due: Completed All people over 65 should have a yearly influenza vaccine or \"flu\" shot. This does not prevent infection with cold viruses but has been proven to prevent hospitalization and death from influenza.  Next due: this season, will be given today Although Medicare part B \"regular Medicare\" currently only covers tetanus vaccination in the context of an injury, a tetanus vaccine (Tdap or Td) is recommended every 10 years. Tdap is generally given once in a lifetime for older adults. Next due: Td in 2026 A shingles vaccine is recommended once in a lifetime after age 61. The Shingles vaccine is also not covered by Medicare part B. Next due: Completed Note, however, that both the Shingles vaccine and Tdap/Td are generally covered by secondary carriers. Please check your coverage and out of pocket expenses. Your pharmacy benefits may cover these vaccines so please check with your pharmacist.  Also consider contacting your local health department because it may stock these vaccines for a reasonable charge. We currently have documentation of the following immunization history for you: 
Immunization History Administered Date(s) Administered  Influenza High Dose Vaccine PF 10/13/2016  Influenza Vaccine 09/01/2013, 10/06/2015  Pneumococcal Conjugate (PCV-13) 10/06/2015  Pneumococcal Polysaccharide (PPSV-23) 09/09/1993, 04/11/2003, 04/09/2010  Td 09/09/1993, 07/30/2008  Tdap 10/20/2016  Zoster Vaccine, Live 01/14/2009 A bone mass density test (DEXA) to screen for osteoporosis or thinning of the bones should be done at least once after age 72 and may be done up to every 2 years as determined by you and your health care provider. The most recent DEXA we have on file for you is: DEXA Results (most recent):  8/10/2017, normal  Next due: baseline screening complete Screening for diabetes mellitus with a blood sugar test (glucose) should be done every year. If you have diabetes, this monitoring will be done more frequently (usually every 3-6 months) and will include A1C testing. The most recent blood glucose we have on file for you is: Lab Results Component Value Date/Time  Glucose 116 07/07/2017 01:49 PM  
 Glaucoma is a disease of the eye due to increased ocular pressure that can lead to blindness. People with risk factors for glaucoma ( race,  American race, diabetes, family history) should be screened every year by an eye professional.  
Last done: March 2017   Next due: March 2018 per Dr. Luis Norwood or sooner if your eyes continue to bother you Cardiovascular screening tests that check for elevated lipids or cholesterol (fatty part of blood) which can lead to heart disease and strokes should be done every 5 years. The most recent lipid panel we have on file for you is:  
Lab Results Component Value Date/Time Cholesterol, total 159 04/12/2017 01:05 PM  
 HDL Cholesterol 83 04/12/2017 01:05 PM  
 LDL, calculated 52.2 04/12/2017 01:05 PM  
 VLDL, calculated 23.8 04/12/2017 01:05 PM  
 Triglyceride 119 04/12/2017 01:05 PM  
 CHOL/HDL Ratio 1.9 04/12/2017 01:05 PM  
 
Next due: annually while on simvastatin Colorectal cancer screening that evaluates for blood or polyps in your colon for people with average risk should be done yearly as a stool test, every five years as a flexible sigmoidoscope or every 10 years as a colonoscopy up to age 76. You and your health care provider may decide whether to continue screening after age 76 or if you need to be screened more frequently. Last: 8/7/2014,  Follow-up every 5 years as recommended by Dr. Jojo Corea due to polyps (next due 2019) Breast cancer screening with a mammogram is recommended at least once every 2 years  for women age 54-69. You and your health care provider may decide whether to continue screening after age 76. The most recent mammogram we have on file for you is: No - only doing breast exams with Dr. Regina Trejo as directed by Dr. Mini Craven No results found for this or any previous visit.  
 
Screening for cervical cancer with a pap smear and pelvic exam is recommended for all women with a cervix until age 72. The frequency of this test is based on the details of your prior pap smear testing (usually every 1 or 2 years - more often with increased risk of cervical cancer or positive family history). You and your health care provider may decide whether to continue screening after age 72. Next due: Routine screening no longer recommended unless otherwise directed by Dr. Claude Wall Screening for infection with Hepatitis C is recommended for anyone born between [birth year] through [address]. People ages 54 to [de-identified] years who have smoked the equivalent of 1 pack per day for 30 years or more may benefit from screening for lung cancer with a yearly low dose CT scan until they have been non smokers for 15 years. Please ask your health care provider if you have any questions. Your Medicare Wellness Exam is recommended annually. Here is a list of your current Health Maintenance items with a due date: 
Health Maintenance Due Topic Date Due  
 Annual Well Visit  08/23/1997  Flu Vaccine  08/01/2017 Introducing [facility]! [facility] introduces T[employer] Layers patient portal. Now you can access parts of your medical record, email your doctor's office, and request medication refills online. 1. In your internet browser, go to [email or web address] 2. Click on the First Time User? Click Here link in the Sign In box. You will see the New Member Sign Up page. 3. Enter your Talking Layers Access Code exactly as it appears below. You will not need to use this code after youve completed the sign-up process. If you do not sign up before the expiration date, you must request a new code. · Talking Layers Access Code: [email or web address] Expires: 11/5/2017  1:11 PM 
 
4. Enter the last four digits of your Social Security Number (xxxx) and Date of Birth (mm/dd/yyyy) as indicated and click Submit. You will be taken to the next sign-up page. 5. Create a [employer]ix ID. This will be your [employer] login ID and cannot be changed, so think of one that is secure and easy to remember. 6. Create a [employer] password. You can change your password at any time. 7. Enter your Password Reset Question and Answer. This can be used at a later time if you forget your password. 8. Enter your e-mail address. You will receive e-mail notification when new information is available in [address] 9. Click Sign Up. You can now view and download portions of your medical record. 10. Click the Download Summary menu link to download a portable copy of your medical information. If you have questions, please visit the Frequently Asked Questions section of the [employer] website. Remember, [employer] is NOT to be used for urgent needs. For medical emergencies, dial 911. Now available from your iPhone and Android! Please provide this summary of care documentation to your next provider. Your primary care clinician is listed as Jessica Agent. If you have any questions after today's visit, please call [phone].

## 2017-09-25 NOTE — PROGRESS NOTES
Dr. Sheba Padilla  referred Marquez Snyder (8/23/1932) a 80 y.o. female for a KevynAden Visit (Deidre Dowling). This is a Subsequent Medicare Annual Wellness Visit providing Personalized Prevention Plan Services (PPPS) (Performed 12 months after initial AWV and PPPS )    I have reviewed the patient's medical history in detail and updated the computerized patient record. History     Past Medical History:   Diagnosis Date    Autoimmune disease ([address]     Breast cancer ([address] 2001, 1/ 2014    right, left    Cancer ([address]     Colon polyps     Elevated cholesterol     Enlarged lymph nodes     pericardium    GERD (gastroesophageal reflux disease)     Hypertension     Ill-defined condition 2007 to 2010    lung-ground glass followed by Dr. Sloane Lockwood of breast, right 1976    benign    Mass of breast, right 1994    benign    Pure hyperglyceridemia     Thyroid disease       Past Surgical History:   Procedure Laterality Date    HX APPENDECTOMY      HX CATARACT REMOVAL Bilateral 2010    HX CHOLECYSTECTOMY      HX HEMORRHOIDECTOMY      HX HERNIA REPAIR  2004    small abdominal repair and fistula    HX HYSTERECTOMY  1991    cystocele    HX LYMPHADENECTOMY Right 7/2001    right axilla    HX MASTECTOMY Right 2001    with reconstruction    HX MASTECTOMY Left 1/14    HX POLYPECTOMY      [id number] muliple removals    HX RECTOCELE REPAIR  2003     Current Outpatient Prescriptions   Medication Sig Dispense Refill    predniSONE (DELTASONE) 5 mg tablet Take 2 tablets (10 mg) by mouth once daily. Decrease to 1 1/2 tablets (7.5 mg) by mouth once daily on October 15, 2017. On 11/7/2017, dose will be further reduced to 1 tablet (5 mg) by mouth once daily as directed by Dr. Harinder Romo.  CALCIUM CITRATE/VITAMIN D3 (CITRACAL + D MAXIMUM PO) Take 2 Tabs by mouth daily.  folic acid 062 mcg tablet Take 800 mcg by mouth daily.       losartan (COZAAR) 100 mg tablet Take 100 mg by mouth daily.  apixaban (ELIQUIS) 2.5 mg tablet Take 1 Tab by mouth two (2) times a day. 60 Tab 0    allopurinol (ZYLOPRIM) 100 mg tablet TAKE 1/2 TABLET BY MOUTH DAILY 45 Tab 3    simvastatin (ZOCOR) 20 mg tablet Take 1 Tab by mouth nightly. 90 Tab 3    raNITIdine (ZANTAC) 300 mg tablet Take 1 Tab by mouth daily. 90 Tab 3    doxepin (SINEQUAN) 25 mg capsule Take 1 Cap by mouth nightly. 90 Cap 3    metoprolol tartrate (LOPRESSOR) 50 mg tablet Take 1 Tab by mouth two (2) times a day. 180 Tab 3    levothyroxine (SYNTHROID) 100 mcg tablet Take 1 Tab by mouth nightly. 90 Tab 3    furosemide (LASIX) 20 mg tablet 1 tablet each AM 90 Tab 3    guanFACINE IR (TENEX) 1 mg IR tablet TAKE 1/2 TABLET BY MOUTH EVERY DAY. 45 Tab 3    peg 400-propylene glycol (SYSTANE, PROPYLENE GLYCOL,) 0.4-0.3 % drop Administer 1 Drop to both eyes as needed.  GLUCOSAMINE HCL/CHONDR LEGER A NA (OSTEO BI-FLEX PO) Take 2 Tabs by mouth daily.  cyanocobalamin 1,000 mcg tablet Take 1,000 mcg by mouth daily.  multivitamins-minerals-lutein (CENTRUM SILVER) tab tablet Take 1 Tab by mouth daily.  loratadine (CLARITIN) 10 mg tablet Take 10 mg by mouth daily.  tamoxifen (NOLVADEX) 20 mg tablet Take 20 mg by mouth daily.  clobetasol (OLUX) 0.05 % topical foam Apply  to affected area two (2) times daily as needed for Skin Irritation. use thin film on affected area      aspirin delayed-release 81 mg tablet Take 81 mg by mouth daily.        Allergies   Allergen Reactions    Codeine Hives    Demerol [Meperidine] Nausea Only    Levaquin [Levofloxacin] Other (comments)     Swelling in feet and legs     Family History   Problem Relation Age of Onset    Hypertension Mother     COPD Mother     Heart Disease Father      Social History   Substance Use Topics    Smoking status: Former Smoker     Packs/day: 2.00     Years: 9.00     Quit date: 1/1/1963    Smokeless tobacco: Never Used    Alcohol use 0.5 oz/week     1 Standard drinks or equivalent per week      Comment: rare     Patient Active Problem List   Diagnosis Code    Insomnia G47.00    Essential hypertension I10    Hyperlipidemia E78.5    Idiopathic gout M10.00    PMR (polymyalgia rheumatica) ([facility]) M35.3       Depression Risk Factor Screening:     PHQ over the last two weeks 9/25/2017   Little interest or pleasure in doing things Not at all   Feeling down, depressed or hopeless Not at all   Total Score PHQ 2 0       Alcohol Risk Factor Screening: You do not drink alcohol or very rarely. Functional Ability and Level of Safety:     Hearing Loss   Hearing is good. Activities of Daily Living   The home contains: grab bars in bathroom only  Patient does total self care except patient does not drive. She needs help with driving and errands that require driving (grocery shopping, etc.) for which she relies on friends/family. ADL Assessment 9/25/2017   Feeding yourself No Help Needed   Getting from bed to chair No Help Needed   Getting dressed No Help Needed   Bathing or showering No Help Needed   Walk across the room (includes cane/walker) No Help Needed   Using the telphone No Help Needed   Taking your medications No Help Needed   Preparing meals No Help Needed   Managing money (expenses/bills) No Help Needed   Moderately strenuous housework (laundry) No Help Needed   Shopping for personal items (toiletries/medicines) Help Needed   Shopping for groceries Help Needed   Driving Help Needed   Climbing a flight of stairs No Help Needed   Getting to places beyond walking distances No Help Needed         Fall Risk     Fall Risk Assessment, last 12 mths 9/25/2017   Able to walk? Yes   Fall in past 12 months? No   Fall with injury? -   Number of falls in past 12 months -   Fall Risk Score -       Abuse Screen   Patient is not abused    Abuse Screening Questionnaire 9/25/2017   Do you ever feel afraid of your partner?  N   Are you in a relationship with someone who physically or mentally threatens you? N   Is it safe for you to go home? Y         Cognitive Screening   Evaluation of Cognitive Function:  Has your family/caregiver stated any concerns about your memory: no  Normal, Mini Cog test 5/5  Mood/affect:  happy  Appearance: age appropriate, well dressed and within normal Limits  Family member/caregiver input: N/A    Physical Examination     No exam data present  Visit Vitals    /80 (BP 1 Location: Left arm, BP Patient Position: Sitting)    Pulse 65    Temp 98 °F (36.7 °C) (Tympanic)    Resp 16    Ht 5' 1\" (1.549 m)    Wt 149 lb (67.6 kg)    SpO2 98%    BMI 28.15 kg/m2       No exam performed by pharmacist today, not required for Medicare Annual Wellness Visit. Patient to be seen by Dr. Sheila Dan separately. Patient Care Team     Patient Care Team:  Sheila Dan MD as PCP - General (Internal Medicine)  Perfecto Crigler, MD as Physician (Dermatology)  Christian Singh MD as Physician (Unknown Physician Specialty)  Susie Mulligan MD as Physician (Ophthalmology)  Blue Morales MD as Physician (Gastroenterology)  Edvin Escalera MD as Physician (Surgery)  Myra Medrano MD as Physician (Hematology and Oncology)  Chance Gandhi MD as Physician (Rheumatology)  Dereck Villanueva MD as Physician (Pulmonary Disease)  ANNIA Salcedo as Physician Assistant (Physician Assistant)    Assessment/Plan     Education and counseling provided:  Are appropriate based on today's review and evaluation  Influenza Vaccine  Colorectal cancer screening tests  Cardiovascular screening blood test  Bone mass measurement (DEXA)  Screening for glaucoma  Diabetes screening test  Tdap / Zostavax vaccination recommendations    Diagnoses and all orders for this visit:    1. Medicare annual wellness visit, subsequent  -     Depression Screen Annual    2. Screening for alcoholism    3. Screening for depression  -     Depression Screen Annual       4. Medication Reconciliation: Performed today. Patient did not bring her medications to the visit but she brought a detailed list.  During the patient interview, the following changes were made:    Discontinued: hydralazine (stopped 6-8 weeks ago by PCP per patient)   Additions: none   Changes / other discrepancies: prednisone taper adjusted (per Dr. Mohit Zapata), clarified dosing of folic acid/Citracal Maximum/Osteo Bi-flex/baby aspirin (held currently while on Eliquis - okay by Dr. Presley Haq as no CAD history), Systane is for both eyes    Patient states that she recently saw Dr. Kinsey Pan who wants to continue tamoxifen for at least another year but he told patient she would need to remain on Eliquis due to increased risk of thromboembolism (tibial clot identified by vascular  And Eliquis was started earlier this month). Patient does have vascular follow-up 10/5/17 to determine duration of anticoagulation. She reports that she has been approved by [employer] for Eliquis until at least March 2018 ($93 copay for 3-month supply). Patient notes about 2-3 day history of diarrhea/loss of stool in the evening after taking Eliquis. She notes \"a bit of gas\" first and then not being able to make it to the restroom. She did not have these symptoms during the first 2 weeks of Eliquis therapy and GI adverse effects are note extremely common with this agent (usually more nausea). She has been taking it on an empty stomach. Discussed with Dr. Presley Haq - will try taking it with food first to see if symptoms improve/resolve. Patient also reports confusion about allopurinol dose. Previously was on 100 mg, she developed a rash and dose was decreased to 50 mg. She was later then able to increase the dose back to 100 mg after colchicine was stopped and she denied having any recurrence of rash. When her latest mailorder prescription arrived it was for 50 mg (1/2 of a 100 mg tab) so she assumed the dose had been reduced by Dr. Presley Haq.   PCP to address today and determine which dose is most appropriate (of note, allopurinol can cause GI symptoms like diarrhea). Medications Discontinued During This Encounter   Medication Reason    hydrALAZINE (APRESOLINE) 25 mg tablet Not A Current Medication    predniSONE (DELTASONE) 5 mg tablet Dose Adjustment    predniSONE (DELTASONE) 5 mg tablet Dose Adjustment    CALCIUM PHOSPHATE TRIB/VIT D3 (CITRACAL + D PO) Formulary Change    folic acid 571 mcg tablet Dose Adjustment       5. Screenings and Immunizations (see patient instructions for chart/information):  Mammogram: no longer done per patient, breast exams as directed by Dr. Mary Brown  Pap: not routinely recommended but advised patient to follow-up as directed by Dr. Jacqui Hebert scan: Up to date 8/10/2017 and normal, baseline screening complete  Colonoscopy: Up to date 8/7/2014, due for repeat every 5 years per Dr. Clement Osgood (polyps)  Glaucoma screening/Eye exam: Up to date per patient March 2017, due for repeat March 2018 per Dr. Keila Mayfield panel: Up to date April 2017, due for repeat annually while on simvastatin  Diabetes: Up to date July 2017, due for repeat as directed by Dr. Eusebia Wilson. Presley (on prednisone chronically for PMR)     Immunizations: Patient confirmed the following records of vaccinations are correct and current. Immunization History   Administered Date(s) Administered    Influenza High Dose Vaccine PF 10/13/2016    Influenza Vaccine 09/01/2013, 10/06/2015    Pneumococcal Conjugate (PCV-13) 10/06/2015    Pneumococcal Polysaccharide (PPSV-23) 09/09/1993, 04/11/2003, 04/09/2010    Td 09/09/1993, 07/30/2008    Tdap 10/20/2016    Zoster Vaccine, Live 01/14/2009       Pneumococcal:  Completed  Influenza:  Recommended that patient receive here or at her pharmacy - will be given today. Zoster: Completed.   Tdap: Tdap given in 2016, Td due in 2026.    6. Advanced Care Planning: Patient currently has advanced directives on file. Patient verbalized understanding of information presented. Answered all of the patient's questions. AVS information was reviewed with patient and will be printed on checkout.     Link Fernando HinojosaD, BCACP    Health Maintenance Due   Topic Date Due    MEDICARE YEARLY EXAM  08/23/1997    INFLUENZA AGE 9 TO ADULT  08/01/2017

## 2017-09-25 NOTE — PATIENT INSTRUCTIONS
Medicare Wellness Visit, Female    The best way to improve and maintain good health is to have a healthy lifestyle by eating a well-balanced diet, exercising regularly, limiting alcohol and stopping smoking. Regular physical exams and screening tests are another way to keep healthy. Preventive exams provided by your health care provider can find health problems before they become diseases or illnesses. Preventive services including immunizations, screening tests, monitoring and exams can help you take care of your own health. Preventive services such as immunizations prevent serious infections. All people over age 72 should have a Pneumovax and a Prevnar-13 shot to prevent potentially life threatening infections with the pneumococcus bacteria, a common cause of pneumonia. These are once in a lifetime unless you and your provider decide differently. Next due: Completed    All people over 65 should have a yearly influenza vaccine or \"flu\" shot. This does not prevent infection with cold viruses but has been proven to prevent hospitalization and death from influenza. Next due: this season, will be given today    Although Medicare part B \"regular Medicare\" currently only covers tetanus vaccination in the context of an injury, a tetanus vaccine (Tdap or Td) is recommended every 10 years. Tdap is generally given once in a lifetime for older adults. Next due: Td in 2026    A shingles vaccine is recommended once in a lifetime after age 61. The Shingles vaccine is also not covered by Medicare part B. Next due: Completed    Note, however, that both the Shingles vaccine and Tdap/Td are generally covered by secondary carriers. Please check your coverage and out of pocket expenses. Your pharmacy benefits may cover these vaccines so please check with your pharmacist.  Also consider contacting your local health department because it may stock these vaccines for a reasonable charge.     We currently have documentation of the following immunization history for you:  Immunization History   Administered Date(s) Administered    Influenza High Dose Vaccine PF 10/13/2016    Influenza Vaccine 09/01/2013, 10/06/2015    Pneumococcal Conjugate (PCV-13) 10/06/2015    Pneumococcal Polysaccharide (PPSV-23) 09/09/1993, 04/11/2003, 04/09/2010    Td 09/09/1993, 07/30/2008    Tdap 10/20/2016    Zoster Vaccine, Live 01/14/2009       A bone mass density test (DEXA) to screen for osteoporosis or thinning of the bones should be done at least once after age 72 and may be done up to every 2 years as determined by you and your health care provider. The most recent DEXA we have on file for you is:  DEXA Results (most recent):  8/10/2017, normal  Next due: baseline screening complete    Screening for diabetes mellitus with a blood sugar test (glucose) should be done every year. If you have diabetes, this monitoring will be done more frequently (usually every 3-6 months) and will include A1C testing. The most recent blood glucose we have on file for you is: Lab Results   Component Value Date/Time    Glucose 116 07/07/2017 01:49 PM       Glaucoma is a disease of the eye due to increased ocular pressure that can lead to blindness. People with risk factors for glaucoma ( race,  American race, diabetes, family history) should be screened every year by an eye professional.   Last done: March 2017   Next due: March 2018 per Dr. Venkatesh Gomez or sooner if your eyes continue to bother you    Cardiovascular screening tests that check for elevated lipids or cholesterol (fatty part of blood) which can lead to heart disease and strokes should be done every 5 years.   The most recent lipid panel we have on file for you is:   Lab Results   Component Value Date/Time    Cholesterol, total 159 04/12/2017 01:05 PM    HDL Cholesterol 83 04/12/2017 01:05 PM    LDL, calculated 52.2 04/12/2017 01:05 PM    VLDL, calculated 23.8 04/12/2017 01:05 PM Triglyceride 119 04/12/2017 01:05 PM    CHOL/HDL Ratio 1.9 04/12/2017 01:05 PM     Next due: annually while on simvastatin    Colorectal cancer screening that evaluates for blood or polyps in your colon for people with average risk should be done yearly as a stool test, every five years as a flexible sigmoidoscope or every 10 years as a colonoscopy up to age 76. You and your health care provider may decide whether to continue screening after age 76 or if you need to be screened more frequently. Last: 8/7/2014,  Follow-up every 5 years as recommended by Dr. Jacinda Lao due to polyps (next due 2019)    Breast cancer screening with a mammogram is recommended at least once every 2 years  for women age 54-69. You and your health care provider may decide whether to continue screening after age 76. The most recent mammogram we have on file for you is:   No - only doing breast exams with Dr. Paolo Yi as directed by Dr. Arianna Reagan  No results found for this or any previous visit. Screening for cervical cancer with a pap smear and pelvic exam is recommended for all women with a cervix until age 72. The frequency of this test is based on the details of your prior pap smear testing (usually every 1 or 2 years - more often with increased risk of cervical cancer or positive family history). You and your health care provider may decide whether to continue screening after age 72. Next due: Routine screening no longer recommended unless otherwise directed by Dr. Eric Garcia for infection with Hepatitis C is recommended for anyone born between [birth year] through [address]. People ages 54 to [de-identified] years who have smoked the equivalent of 1 pack per day for 30 years or more may benefit from screening for lung cancer with a yearly low dose CT scan until they have been non smokers for 15 years. Please ask your health care provider if you have any questions. Your Medicare Wellness Exam is recommended annually.     Here is a list of your current Health Maintenance items with a due date:  Health Maintenance Due   Topic Date Due    Annual Well Visit  08/23/1997    Flu Vaccine  08/01/2017

## 2017-10-05 ENCOUNTER — OFFICE VISIT (OUTPATIENT)
Dept: VASCULAR SURGERY | Age: 82
End: 2017-10-05

## 2017-10-05 VITALS
BODY MASS INDEX: 28.13 KG/M2 | DIASTOLIC BLOOD PRESSURE: 68 MMHG | RESPIRATION RATE: 16 BRPM | WEIGHT: 149 LBS | HEIGHT: 61 IN | HEART RATE: 70 BPM | SYSTOLIC BLOOD PRESSURE: 132 MMHG

## 2017-10-05 DIAGNOSIS — I82.441 ACUTE DVT OF RIGHT TIBIAL VEIN (HCC): ICD-10-CM

## 2017-10-05 DIAGNOSIS — I82.441 ACUTE DVT OF RIGHT TIBIAL VEIN (HCC): Primary | ICD-10-CM

## 2017-10-05 DIAGNOSIS — I87.2 VENOUS REFLUX: ICD-10-CM

## 2017-10-05 DIAGNOSIS — R60.0 LEG EDEMA, RIGHT: ICD-10-CM

## 2017-10-05 NOTE — PROCEDURES
[facility]   *** FINAL REPORT ***    Name: Donnie Del Angel  MRN: [id number]       Outpatient  : 23 Aug 1932  HIS Order #: [id number]  [id number] [facility] Visit #: [id number]  Date: 05 Oct 2017    TYPE OF TEST: Peripheral Venous Testing    REASON FOR TEST  H/O DVT    Right Leg:-  Deep venous thrombosis:           Yes  Proximal extent of thrombus:      Peroneal  Superficial venous thrombosis:    Not examined  Deep venous insufficiency:        Not examined  Superficial venous insufficiency: Not examined      INTERPRETATION/FINDINGS  Duplex images were obtained using 2-D gray scale, color flow and  spectral doppler analysis. Right leg :  1. Nonocclusive sub-acute thrombus in one right gastrocnemius vein. 2. Nonocclusive sub-acute thrombus in one right peroneal vein in the  mid and proximal calf. 3. No other acute venous thrombus identified in the right common  femoral, proximal deep femoral, femoral, popliteal and posterior  tibial veins. 4. Biphasic right posterior tibial artery flow. 5. Patent contralateral common femoral vein with normal venous  hemodynamics. 6. Compared to the previous study on 17 there has been improvement   from occlusive thrombus to nonocclusive. ADDITIONAL COMMENTS    I have personally reviewed the data relevant to the interpretation of  this  study. TECHNOLOGIST: Rusty Mc RVT, RDMS  Signed: 10/05/2017 11:52 AM    PHYSICIAN: Jerrod Donato D.O.   Signed: 10/06/2017 08:18 AM

## 2017-10-05 NOTE — MR AVS SNAPSHOT
Visit Information Date & Time Provider Department Dept. Phone Encounter #  
 10/5/2017  1:30 PM Jame Swanson, [address] [facility] [id number] Your Appointments 11/3/2017  9:45 AM  
Follow Up with Sarah Dougherty MD  
[facility] OF [place] [address]) Appt Note: 3 mo f/u; r/s 11/9  
 [address] [facility], [address] [place] [address]  
  
   
 [address], [address] [place] [address] Upcoming Health Maintenance Date Due  
 GLAUCOMA SCREENING Q2Y 3/1/2018 MEDICARE YEARLY EXAM 9/26/2018 DTaP/Tdap/Td series (2 - Td) 10/20/2026 Allergies as of 10/5/2017  Review Complete On: 10/5/2017 By: Jernoimo Davis LPN Severity Noted Reaction Type Reactions Codeine  08/04/2014    Hives Demerol [Meperidine]  08/04/2014    Nausea Only Levaquin [Levofloxacin]  08/04/2014    Other (comments) Swelling in feet and legs Current Immunizations  Reviewed on 9/22/2017 Name Date Influenza High Dose Vaccine PF 9/25/2017, 10/13/2016  8:53 AM  
 Influenza Vaccine 10/6/2015, 9/1/2013 12:00 AM  
 Pneumococcal Conjugate (PCV-13) 10/6/2015 Pneumococcal Polysaccharide (PPSV-23) 4/9/2010, 4/11/2003, 9/9/1993 Td 7/30/2008, 9/9/1993 Tdap 10/20/2016  8:40 AM  
 Zoster Vaccine, Live 1/14/2009 Not reviewed this visit Vitals BP Pulse Resp Height(growth percentile) Weight(growth percentile) BMI  
 132/68 (BP 1 Location: Left arm, BP Patient Position: Sitting) 70 16 5' 1\" (1.549 m) 149 lb (67.6 kg) 28.15 kg/m2 OB Status Smoking Status Hysterectomy Former Smoker Vitals History BMI and BSA Data Body Mass Index Body Surface Area  
 28.15 kg/m 2 1.71 m 2 Preferred Pharmacy Pharmacy Name Phone [address] [facility], [address] [address] [facility] [address] Your Updated Medication List  
  
   
 This list is accurate as of: 10/5/17  1:50 PM.  Always use your most recent med list.  
  
  
  
  
 allopurinol 100 mg tablet Commonly known as:  ZYLOPRIM  
TAKE 1/2 TABLET BY MOUTH DAILY  
  
 * apixaban 2.5 mg tablet Commonly known as:  [place] Take 1 Tab by mouth two (2) times a day. * apixaban 2.5 mg tablet Commonly known as:  [place] Take 1 Tab by mouth two (2) times a day. CENTRUM SILVER Tab tablet Generic drug:  multivitamins-minerals-lutein Take 1 Tab by mouth daily. CITRACAL + D MAXIMUM PO Take 2 Tabs by mouth daily. clobetasol 0.05 % topical foam  
Commonly known as:  OLUX Apply  to affected area two (2) times daily as needed for Skin Irritation. use thin film on affected area  
  
 cyanocobalamin 1,000 mcg tablet Take 1,000 mcg by mouth daily. doxepin 25 mg capsule Commonly known as:  SINEquan Take 1 Cap by mouth nightly. folic acid 237 mcg tablet Take 800 mcg by mouth daily. furosemide 20 mg tablet Commonly known as:  LASIX  
1 tablet each AM  
  
 guanFACINE IR 1 mg IR tablet Commonly known as:  TENEX  
TAKE 1/2 TABLET BY MOUTH EVERY DAY. levothyroxine 100 mcg tablet Commonly known as:  SYNTHROID Take 1 Tab by mouth nightly. loratadine 10 mg tablet Commonly known as:  Sonia Torey Take 10 mg by mouth daily. losartan 100 mg tablet Commonly known as:  COZAAR Take 1 Tab by mouth daily. metoprolol tartrate 50 mg tablet Commonly known as:  LOPRESSOR Take 1 Tab by mouth two (2) times a day. OSTEO BI-FLEX PO Take 2 Tabs by mouth daily. predniSONE 5 mg tablet Commonly known as:  Theodore Garcia Take 2 tablets (10 mg) by mouth once daily. Decrease to 1 1/2 tablets (7.5 mg) by mouth once daily on October 15, 2017. On 11/7/2017, dose will be further reduced to 1 tablet (5 mg) by mouth once daily as directed by Dr. Simone De La Garza. raNITIdine 300 mg tablet Commonly known as:  ZANTAC Take 1 Tab by mouth daily. simvastatin 20 mg tablet Commonly known as:  ZOCOR Take 1 Tab by mouth nightly. SYSTANE (PROPYLENE GLYCOL) 0.4-0.3 % Drop Generic drug:  peg 400-propylene glycol Administer 1 Drop to both eyes as needed. tamoxifen 20 mg tablet Commonly known as:  NOLVADEX Take 20 mg by mouth daily. * Notice: This list has 2 medication(s) that are the same as other medications prescribed for you. Read the directions carefully, and ask your doctor or other care provider to review them with you. Prescriptions Sent to Pharmacy Refills  
 apixaban (ELIQUIS) 2.5 mg tablet 2 Sig: Take 1 Tab by mouth two (2) times a day. Class: Normal  
 Pharmacy: [address] Ph #: [phone] Route: Oral  
  
To-Do List   
 10/23/2017 9:30 AM  
  Appointment with [facility] CT RM 1 at [facility] ([phone]) GENERAL INSTRUCTIONS  This study does not require you to drink contrast prior to your study. RELATED STUDY INFORMATION  Bring any films, CDs, and reports related with you on the day of your exam.  This only includes studies done outside of [address] [place] [address], [place] [address], [place], and [place]. QUESTIONS  Notify the CT Department if you have any questions concerning your study. [place] - [phone] [facility] [place] - [phone] Introducing [facility]! Richelle Frazier introduces [employer] patient portal. Now you can access parts of your medical record, email your doctor's office, and request medication refills online. 1. In your internet browser, go to [email or web address] 2. Click on the First Time User? Click Here link in the Sign In box. You will see the New Member Sign Up page. 3. Enter your [employer] Access Code exactly as it appears below. You will not need to use this code after youve completed the sign-up process.  If you do not sign up before the expiration date, you must request a new code. · [employer] Access Code: [email or web address] Expires: 11/5/2017  1:11 PM 
 
4. Enter the last four digits of your Social Security Number (xxxx) and Date of Birth (mm/dd/yyyy) as indicated and click Submit. You will be taken to the next sign-up page. 5. Create a [employer] ID. This will be your [employer] login ID and cannot be changed, so think of one that is secure and easy to remember. 6. Create a [employer] password. You can change your password at any time. 7. Enter your Password Reset Question and Answer. This can be used at a later time if you forget your password. 8. Enter your e-mail address. You will receive e-mail notification when new information is available in [address] 9. Click Sign Up. You can now view and download portions of your medical record. 10. Click the Download Summary menu link to download a portable copy of your medical information. If you have questions, please visit the Frequently Asked Questions section of the [employer] website. Remember, [employer] is NOT to be used for urgent needs. For medical emergencies, dial 911. Now available from your iPhone and Android! Please provide this summary of care documentation to your next provider. Your primary care clinician is listed as Pop Hsu. If you have any questions after today's visit, please call [phone].

## 2017-10-11 NOTE — PROGRESS NOTES
Ms Lady Morales was referred to us for right leg swelling over one month ago. She has had swelling on and off for a number of years. She in fact had a negative DVT study done a little over a year ago when she was having problems with swelling. But she has had more persistent swelling now for the past month. She is described that her swelling will typically go down at night and then recurs throughout the day. But this current swelling is one that does not really change much with position. She does have compression, but confesses she does not really wear it. She will elevate but again it is not helping. She does not really have any pain. She does have some varicose veins. The left leg does have some veins, she has not had a swelling issues in that leg. No symptoms of claudication or rest pain. No sores or ulcerations. We sent for reflux study. Though she had what I referred to as textbook GSV reflux that would qualify her for ablation procedure, she had acute tibial vein DVT! We did start her on eliquis and planned it for about one month since isolated thrombus  She had follow up study today, results as follows:    Right leg :  1. Nonocclusive sub-acute thrombus in one right gastrocnemius vein. 2. Nonocclusive sub-acute thrombus in one right peroneal vein in the  mid and proximal calf. 3. No other acute venous thrombus identified in the right common  femoral, proximal deep femoral, femoral, popliteal and posterior  tibial veins. 4. Biphasic right posterior tibial artery flow. 5. Patent contralateral common femoral vein with normal venous  hemodynamics. 6. Compared to the previous study on 9-7-17 there has been improvement   from occlusive thrombus to nonocclusive.     Though improved, this would still require treatment, at least 3 months  She had since followed with PCP and oncologist (with history of breast cancer) and since this appears to have been an unprovoked dvt, they suggested she stay on the anticoagulation as long as she remains on tamoxifen (through early 2019)    With that consideration, she still is very intersted in her options for the ablation, since she still suffers with the edema in spite of efforts with compression and elevation regularly, which she has now been doing since she first saw us in August. She confesses swelling is probably slightly better with these efforts, but is hopeful for more relief  I conferred with Dr Jonathon Jovel. Would at least wait the full 3 months, and so in January, we could do R GSV ablation.  Would have her take the eliquis until the day prior to the procedure, and resume the following day    She stated this sounds like a reasonable plan for her  Details of the procedure and follow up explained, as well as hopeful results of reduced leg swelling as primary outcome  Will tentatively schedule for January    Total time spent was 15 minutes

## 2017-10-18 ENCOUNTER — HOSPITAL ENCOUNTER (OUTPATIENT)
Dept: LAB | Age: 82
Discharge: HOME | End: 2017-10-18
Payer: MEDICARE

## 2017-10-18 ENCOUNTER — LAB ONLY (OUTPATIENT)
Dept: INTERNAL MEDICINE CLINIC | Age: 82
End: 2017-10-18

## 2017-10-18 DIAGNOSIS — M10.00 IDIOPATHIC GOUT, UNSPECIFIED CHRONICITY, UNSPECIFIED SITE: ICD-10-CM

## 2017-10-18 DIAGNOSIS — E78.00 PURE HYPERCHOLESTEROLEMIA: ICD-10-CM

## 2017-10-18 DIAGNOSIS — I10 ESSENTIAL HYPERTENSION: ICD-10-CM

## 2017-10-18 DIAGNOSIS — I10 ESSENTIAL HYPERTENSION: Primary | ICD-10-CM

## 2017-10-18 LAB
ANION GAP SERPL CALC-SCNC: 8 MMOL/L (ref 3–18)
BUN SERPL-MCNC: 36 MG/DL (ref 7–18)
BUN/CREAT SERPL: 27 (ref 12–20)
CALCIUM SERPL-MCNC: 9.8 MG/DL (ref 8.5–10.1)
CHLORIDE SERPL-SCNC: 104 MMOL/L (ref 100–108)
CO2 SERPL-SCNC: 28 MMOL/L (ref 21–32)
CREAT SERPL-MCNC: 1.35 MG/DL (ref 0.6–1.3)
GLUCOSE SERPL-MCNC: 88 MG/DL (ref 74–99)
POTASSIUM SERPL-SCNC: 4.2 MMOL/L (ref 3.5–5.5)
SODIUM SERPL-SCNC: 140 MMOL/L (ref 136–145)
URATE SERPL-MCNC: 6.1 MG/DL (ref 2.6–7.2)

## 2017-10-18 PROCEDURE — 84550 ASSAY OF BLOOD/URIC ACID: CPT | Performed by: INTERNAL MEDICINE

## 2017-10-18 PROCEDURE — 80048 BASIC METABOLIC PNL TOTAL CA: CPT | Performed by: INTERNAL MEDICINE

## 2017-10-19 ENCOUNTER — TELEPHONE (OUTPATIENT)
Dept: VASCULAR SURGERY | Age: 82
End: 2017-10-19

## 2017-10-19 NOTE — TELEPHONE ENCOUNTER
Called patient  Advised the 20-30mmhg compression and may benefit from knee high and even alternate with a thigh high      Has an area she says has some discoloration on shin. We had made mention of this in the office when here last. Can be venous stasis. Not warm, and no fevers/chills.  Encouraged to call if gets worse

## 2017-10-19 NOTE — TELEPHONE ENCOUNTER
----- Message from Keara Wheatley sent at 10/19/2017 11:41 AM EDT -----  Regarding: Compression needed  Patient called and wanted to ask what compression she needs. Please advise and will call her.      Thanks

## 2017-10-23 ENCOUNTER — HOSPITAL ENCOUNTER (OUTPATIENT)
Dept: CT IMAGING | Age: 82
Discharge: HOME | End: 2017-10-23
Attending: INTERNAL MEDICINE
Payer: MEDICARE

## 2017-10-23 DIAGNOSIS — J84.9 INTERSTITIAL PULMONARY DISEASE (HCC): ICD-10-CM

## 2017-10-23 PROCEDURE — 71250 CT THORAX DX C-: CPT

## 2017-11-03 ENCOUNTER — OFFICE VISIT (OUTPATIENT)
Dept: INTERNAL MEDICINE CLINIC | Age: 82
End: 2017-11-03

## 2017-11-03 VITALS
SYSTOLIC BLOOD PRESSURE: 122 MMHG | HEART RATE: 62 BPM | TEMPERATURE: 97.6 F | WEIGHT: 150 LBS | RESPIRATION RATE: 16 BRPM | OXYGEN SATURATION: 95 % | HEIGHT: 61 IN | DIASTOLIC BLOOD PRESSURE: 70 MMHG | BODY MASS INDEX: 28.32 KG/M2

## 2017-11-03 DIAGNOSIS — R94.31 ABNORMAL EKG: ICD-10-CM

## 2017-11-03 DIAGNOSIS — J84.10 PULMONARY FIBROSIS (HCC): Primary | ICD-10-CM

## 2017-11-03 DIAGNOSIS — I10 ESSENTIAL HYPERTENSION: ICD-10-CM

## 2017-11-03 NOTE — PROGRESS NOTES
1. Have you been to the ER, urgent care clinic since your last visit? Hospitalized since your last visit? No    2. Have you seen or consulted any other health care providers outside of the [address] [place] [address] since your last visit? Include any pap smears or colon screening.  Dr. Abad Alvarado - Rheumatologist; referred to pulmonary and vascular; found a blood clot in right leg - eliquis - went back in one month ultrasound - clot smaller; Received breathing test - pulmanary fibrosis - wants to do a biopsy

## 2017-11-03 NOTE — MR AVS SNAPSHOT
Visit Information Date & Time Provider Department Dept. Phone Encounter #  
 11/3/2017  9:45 AM Asia Ace MD [facility] MEDICINE OF [place] [phone] [id number] Your Appointments 11/17/2017 11:00 AM  
Follow Up with Asia Ace MD  
[address] [facility]) Appt Note: 2 weeks [address], [address] [place]  
  
   
 [address], [address] [place] [address] Upcoming Health Maintenance Date Due  
 GLAUCOMA SCREENING Q2Y 3/1/2018 MEDICARE YEARLY EXAM 9/26/2018 DTaP/Tdap/Td series (2 - Td) 10/20/2026 Allergies as of 11/3/2017  Review Complete On: 11/3/2017 By: James Wellington LPN Severity Noted Reaction Type Reactions Codeine  08/04/2014    Hives Demerol [Meperidine]  08/04/2014    Nausea Only Levaquin [Levofloxacin]  08/04/2014    Other (comments) Swelling in feet and legs Current Immunizations  Reviewed on 9/22/2017 Name Date Influenza High Dose Vaccine PF 9/25/2017, 10/13/2016  8:53 AM  
 Influenza Vaccine 10/6/2015, 9/1/2013 12:00 AM  
 Pneumococcal Conjugate (PCV-13) 10/6/2015 Pneumococcal Polysaccharide (PPSV-23) 4/9/2010, 4/11/2003, 9/9/1993 Td 7/30/2008, 9/9/1993 Tdap 10/20/2016  8:40 AM  
 Zoster Vaccine, Live 1/14/2009 Not reviewed this visit You Were Diagnosed With   
  
 Codes Comments Essential hypertension    -  Primary ICD-10-CM: I10 
ICD-9-CM: 401.9 Pulmonary fibrosis ([facility] [address]     ICD-10-CM: J84.10 ICD-9-CM: 374 Abnormal EKG     ICD-10-CM: R94.31 
ICD-9-CM: 794.31 Vitals BP Pulse Temp Resp Height(growth percentile) 122/70 (BP 1 Location: Left arm, BP Patient Position: Sitting) 62 97.6 °F (36.4 °C) (Tympanic) 16 5' 1\" (1.549 m) Weight(growth percentile) SpO2 BMI OB Status Smoking Status 150 lb (68 kg) 95% 28.34 kg/m2 Hysterectomy Former Smoker BMI and BSA Data Body Mass Index Body Surface Area  
 28.34 kg/m 2 1.71 m 2 Preferred Pharmacy Pharmacy Name Phone [address] [facility], [address] [address] [place] Your Updated Medication List  
  
   
This list is accurate as of: 11/3/17 12:30 PM.  Always use your most recent med list.  
  
  
  
  
 allopurinol 100 mg tablet Commonly known as:  ZYLOPRIM  
TAKE 1/2 TABLET BY MOUTH DAILY  
  
 apixaban 2.5 mg tablet Commonly known as:  Sidney Natalia Take 1 Tab by mouth two (2) times a day. CENTRUM SILVER Tab tablet Generic drug:  multivitamins-minerals-lutein Take 1 Tab by mouth daily. CITRACAL + D MAXIMUM PO Take 2 Tabs by mouth daily. clobetasol 0.05 % topical foam  
Commonly known as:  OLUX Apply  to affected area two (2) times daily as needed for Skin Irritation. use thin film on affected area  
  
 cyanocobalamin 1,000 mcg tablet Take 1,000 mcg by mouth daily. doxepin 25 mg capsule Commonly known as:  SINEquan Take 1 Cap by mouth nightly. folic acid 242 mcg tablet Take 800 mcg by mouth daily. furosemide 20 mg tablet Commonly known as:  LASIX  
1 tablet each AM  
  
 guanFACINE IR 1 mg IR tablet Commonly known as:  TENEX  
TAKE 1/2 TABLET BY MOUTH EVERY DAY. levothyroxine 100 mcg tablet Commonly known as:  SYNTHROID Take 1 Tab by mouth nightly. loratadine 10 mg tablet Commonly known as:  Leanne Milagros Take 10 mg by mouth daily. losartan 100 mg tablet Commonly known as:  COZAAR Take 1 Tab by mouth daily. metoprolol tartrate 50 mg tablet Commonly known as:  LOPRESSOR Take 1 Tab by mouth two (2) times a day. OSTEO BI-FLEX PO Take 2 Tabs by mouth daily. predniSONE 5 mg tablet Commonly known as:  Bert Razor Take 2 tablets (10 mg) by mouth once daily. Decrease to 1 1/2 tablets (7.5 mg) by mouth once daily on October 15, 2017.   On 11/7/2017, dose will be further reduced to 1 tablet (5 mg) by mouth once daily as directed by Dr. Modesta Garner. raNITIdine 300 mg tablet Commonly known as:  ZANTAC Take 1 Tab by mouth daily. simvastatin 20 mg tablet Commonly known as:  ZOCOR Take 1 Tab by mouth nightly. SYSTANE (PROPYLENE GLYCOL) 0.4-0.3 % Drop Generic drug:  peg 400-propylene glycol Administer 1 Drop to both eyes as needed. tamoxifen 20 mg tablet Commonly known as:  NOLVADEX Take 20 mg by mouth daily. We Performed the Following AMB POC EKG ROUTINE W/ 12 LEADS, INTER & REP [25528 CPT(R)] To-Do List   
 11/03/2017 ECHO:  2D ECHO COMPLETE ADULT (TTE) W OR WO CONTR Introducing [facility]! [facility] introduces [employer] patient portal. Now you can access parts of your medical record, email your doctor's office, and request medication refills online. 1. In your internet browser, go to [email or web address] 2. Click on the First Time User? Click Here link in the Sign In box. You will see the New Member Sign Up page. 3. Enter your [employer] Access Code exactly as it appears below. You will not need to use this code after youve completed the sign-up process. If you do not sign up before the expiration date, you must request a new code. · [employer] Access Code: [email or web address] Expires: 11/5/2017  1:11 PM 
 
4. Enter the last four digits of your Social Security Number (xxxx) and Date of Birth (mm/dd/yyyy) as indicated and click Submit. You will be taken to the next sign-up page. 5. Create a [employer] ID. This will be your [employer] login ID and cannot be changed, so think of one that is secure and easy to remember. 6. Create a [employer] password. You can change your password at any time. 7. Enter your Password Reset Question and Answer. This can be used at a later time if you forget your password. 8. Enter your e-mail address.  You will receive e-mail notification when new information is available in [employer]. 9. Click Sign Up. You can now view and download portions of your medical record. 10. Click the Download Summary menu link to download a portable copy of your medical information. If you have questions, please visit the Frequently Asked Questions section of the [employer] website. Remember, [employer] is NOT to be used for urgent needs. For medical emergencies, dial 911. Now available from your iPhone and Android! Please provide this summary of care documentation to your next provider. Your primary care clinician is listed as Alonzo Bautista. If you have any questions after today's visit, please call [phone].

## 2017-11-04 NOTE — PROGRESS NOTES
The patient presents to the office today with the chief complaint of pulmonary fibrosis    HPI    The patient remains on medications for hypertenion. she is tolerating the medications well. The patient has pulmonary fibrosis. The CT shows worsening scarring. Despite the worsening CT scan the patient has no complaints of dyspnea. The patient is being considered for a transbronchial biopsy. The patient does note mild LE swelling. The patient notes a histor of an abnormal EKG        Review of Systems   Respiratory: Negative for shortness of breath. Cardiovascular: Positive for leg swelling. Negative for chest pain. Allergies   Allergen Reactions    Codeine Hives    Demerol [Meperidine] Nausea Only    Levaquin [Levofloxacin] Other (comments)     Swelling in feet and legs       Current Outpatient Prescriptions   Medication Sig Dispense Refill    predniSONE (DELTASONE) 5 mg tablet Take 2 tablets (10 mg) by mouth once daily. Decrease to 1 1/2 tablets (7.5 mg) by mouth once daily on October 15, 2017. On 11/7/2017, dose will be further reduced to 1 tablet (5 mg) by mouth once daily as directed by Dr. Chidi Cancino.  CALCIUM CITRATE/VITAMIN D3 (CITRACAL + D MAXIMUM PO) Take 2 Tabs by mouth daily.  folic acid 581 mcg tablet Take 800 mcg by mouth daily.  losartan (COZAAR) 100 mg tablet Take 1 Tab by mouth daily. 90 Tab 3    apixaban (ELIQUIS) 2.5 mg tablet Take 1 Tab by mouth two (2) times a day. 60 Tab 0    allopurinol (ZYLOPRIM) 100 mg tablet TAKE 1/2 TABLET BY MOUTH DAILY 45 Tab 3    simvastatin (ZOCOR) 20 mg tablet Take 1 Tab by mouth nightly. 90 Tab 3    raNITIdine (ZANTAC) 300 mg tablet Take 1 Tab by mouth daily. 90 Tab 3    doxepin (SINEQUAN) 25 mg capsule Take 1 Cap by mouth nightly. 90 Cap 3    metoprolol tartrate (LOPRESSOR) 50 mg tablet Take 1 Tab by mouth two (2) times a day. 180 Tab 3    levothyroxine (SYNTHROID) 100 mcg tablet Take 1 Tab by mouth nightly.  90 Tab 3    furosemide (LASIX) 20 mg tablet 1 tablet each AM 90 Tab 3    guanFACINE IR (TENEX) 1 mg IR tablet TAKE 1/2 TABLET BY MOUTH EVERY DAY. 45 Tab 3    peg 400-propylene glycol (SYSTANE, PROPYLENE GLYCOL,) 0.4-0.3 % drop Administer 1 Drop to both eyes as needed.  GLUCOSAMINE HCL/CHONDR LEGER A NA (OSTEO BI-FLEX PO) Take 2 Tabs by mouth daily.  cyanocobalamin 1,000 mcg tablet Take 1,000 mcg by mouth daily.  multivitamins-minerals-lutein (CENTRUM SILVER) tab tablet Take 1 Tab by mouth daily.  loratadine (CLARITIN) 10 mg tablet Take 10 mg by mouth daily.  tamoxifen (NOLVADEX) 20 mg tablet Take 20 mg by mouth daily.  clobetasol (OLUX) 0.05 % topical foam Apply  to affected area two (2) times daily as needed for Skin Irritation.  use thin film on affected area         Past Medical History:   Diagnosis Date    Autoimmune disease ([address]     Breast cancer ([address] 2001, 1/ 2014    right, left    Cancer ([address]     Colon polyps     Elevated cholesterol     Enlarged lymph nodes     pericardium    GERD (gastroesophageal reflux disease)     Hypertension     Ill-defined condition 2007 to 2010    lung-ground glass followed by Dr. Shaan Humphries of breast, right 1976    benign    Mass of breast, right 1994    benign    Pure hyperglyceridemia     Thyroid disease        Past Surgical History:   Procedure Laterality Date    HX APPENDECTOMY      HX CATARACT REMOVAL Bilateral 2010    HX CHOLECYSTECTOMY      HX HEMORRHOIDECTOMY      HX HERNIA REPAIR  2004    small abdominal repair and fistula    HX HYSTERECTOMY  1991    cystocele    HX LYMPHADENECTOMY Right 7/2001    right axilla    HX MASTECTOMY Right 2001    with reconstruction    HX MASTECTOMY Left 1/14    HX POLYPECTOMY      [id number] muliple removals    HX RECTOCELE REPAIR  2003       Social History     Social History    Marital status:      Spouse name: N/A    Number of children: N/A    Years of education: N/A     Occupational History    Not on file. Social History Main Topics    Smoking status: Former Smoker     Packs/day: 2.00     Years: 9.00     Quit date: 1/1/1963    Smokeless tobacco: Never Used    Alcohol use 0.5 oz/week     1 Standard drinks or equivalent per week      Comment: rare    Drug use: No    Sexual activity: Not Currently     Other Topics Concern    Not on file     Social History Narrative       Patient does have an advanced directive on file    Visit Vitals    /70 (BP 1 Location: Left arm, BP Patient Position: Sitting)    Pulse 62    Temp 97.6 °F (36.4 °C) (Tympanic)    Resp 16    Ht 5' 1\" (1.549 m)    Wt 150 lb (68 kg)    SpO2 95%    BMI 28.34 kg/m2       Physical Exam   Cardiovascular: Normal rate and regular rhythm. No murmur heard. Pulmonary/Chest: She has no wheezes. She has no rales. Abdominal: Soft. She exhibits no distension. There is no tenderness. Musculoskeletal: She exhibits edema (trace :LE edema). BMI:  [facility] Outpatient Visit on 10/18/2017   Component Date Value Ref Range Status    Uric acid 10/18/2017 6.1  2.6 - 7.2 MG/DL Final    Comment: The drugs N-acetylcysteine (NAC) and  Metamiszole have been found to cause falsely  low results in this chemical assay. Please  be sure to submit blood samples obtained  BEFORE administration of either of these  drugs to assure correct results.       Sodium 10/18/2017 140  136 - 145 mmol/L Final    Potassium 10/18/2017 4.2  3.5 - 5.5 mmol/L Final    Chloride 10/18/2017 104  100 - 108 mmol/L Final    CO2 10/18/2017 28  21 - 32 mmol/L Final    Anion gap 10/18/2017 8  3.0 - 18 mmol/L Final    Glucose 10/18/2017 88  74 - 99 mg/dL Final    BUN 10/18/2017 36* 7.0 - 18 MG/DL Final    Creatinine 10/18/2017 1.35* 0.6 - 1.3 MG/DL Final    BUN/Creatinine ratio 10/18/2017 27* 12 - 20   Final    GFR est AA 10/18/2017 45* >60 ml/min/1.73m2 Final    GFR est non-AA 10/18/2017 37* >60 ml/min/1.73m2 Final    Comment: (NOTE)  Estimated GFR is calculated using the Modification of Diet in Renal   Disease (MDRD) Study equation, reported for both  Americans   (GFRAA) and non- Americans (GFRNA), and normalized to 1.73m2   body surface area. The physician must decide which value applies to   the patient. The MDRD study equation should only be used in   individuals age 25 or older. It has not been validated for the   following: pregnant women, patients with serious comorbid conditions,   or on certain medications, or persons with extremes of body size,   muscle mass, or nutritional status.  Calcium 10/18/2017 9.8  8.5 - 10.1 MG/DL Final   Hospital Outpatient Visit on 08/07/2017   Component Date Value Ref Range Status    C-Reactive protein 08/07/2017 1.4* 0 - 0.3 mg/dL Final    Sed rate, automated 08/07/2017 56* 0 - 30 mm/hr Final       .  Results for orders placed or performed in visit on 11/03/17   AMB POC EKG ROUTINE W/ 12 LEADS, INTER & REP    Impression    High voltate - LVH with a possible additional intraventricular conduction delay. Left anterior fascicular block       Assessment / Plan      ICD-10-CM ICD-9-CM    1. Pulmonary fibrosis ([facility] J84.10 515 2D ECHO COMPLETE ADULT (TTE) W OR WO CONTR   2. Essential hypertension I10 401.9 2D ECHO COMPLETE ADULT (TTE) W OR WO CONTR   3. Abnormal EKG R94.31 794.31 AMB POC EKG ROUTINE W/ 12 LEADS, INTER & REP       2D Echo  she was advised to continue her maintenance medications        Follow-up Disposition:  Return in about 3 months (around 2/3/2018). I asked Mar Morales if she has any questions and I answered the questions. Jocelin Morales states that she understands the treatment plan and agrees with the treatment plan

## 2017-11-09 ENCOUNTER — HOSPITAL ENCOUNTER (OUTPATIENT)
Dept: NON INVASIVE DIAGNOSTICS | Age: 82
Discharge: HOME | End: 2017-11-09
Attending: INTERNAL MEDICINE
Payer: MEDICARE

## 2017-11-09 DIAGNOSIS — I10 ESSENTIAL HYPERTENSION: ICD-10-CM

## 2017-11-09 DIAGNOSIS — J84.10 PULMONARY FIBROSIS (HCC): ICD-10-CM

## 2017-11-09 PROCEDURE — 93306 TTE W/DOPPLER COMPLETE: CPT

## 2017-11-13 ENCOUNTER — OFFICE VISIT (OUTPATIENT)
Dept: CARDIOLOGY CLINIC | Age: 82
End: 2017-11-13

## 2017-11-13 VITALS
BODY MASS INDEX: 28.89 KG/M2 | OXYGEN SATURATION: 95 % | HEART RATE: 64 BPM | HEIGHT: 61 IN | WEIGHT: 153 LBS | SYSTOLIC BLOOD PRESSURE: 140 MMHG | DIASTOLIC BLOOD PRESSURE: 60 MMHG

## 2017-11-13 DIAGNOSIS — E78.00 PURE HYPERCHOLESTEROLEMIA: ICD-10-CM

## 2017-11-13 DIAGNOSIS — I10 ESSENTIAL HYPERTENSION: ICD-10-CM

## 2017-11-13 DIAGNOSIS — R94.31 ABNORMAL ECG: Primary | ICD-10-CM

## 2017-11-13 DIAGNOSIS — J84.10 PULMONARY FIBROSIS (HCC): ICD-10-CM

## 2017-11-13 DIAGNOSIS — R06.09 DOE (DYSPNEA ON EXERTION): ICD-10-CM

## 2017-11-13 DIAGNOSIS — M35.3 PMR (POLYMYALGIA RHEUMATICA) (HCC): ICD-10-CM

## 2017-11-13 NOTE — PROGRESS NOTES
1. Have you been to the ER, urgent care clinic since your last visit? Hospitalized since your last visit? no  2. Have you seen or consulted any other health care providers outside of the [address] [place] [address] since your last visit? Include any pap smears or colon screening.   no

## 2017-11-13 NOTE — PATIENT INSTRUCTIONS
Continue current medications.    If you have any further questions or concerns, please contact our office. [phone]

## 2017-11-13 NOTE — PROGRESS NOTES
HISTORY OF PRESENT ILLNESS  Sharon Williamson is a 80 y.o. female. HPI    She is referred to my office for further evaluation of cardiac status prior to consideration of lung biopsy. She has been found to have abnormal EKG, which has been concerning to the pulmonologist prior to considering the lung biopsy. She has been feeling reasonably well and has only mild dyspnea on exertion. She has no resting dyspnea, orthopnea or PND. She denies coughing, wheezing or any sputum production. She denies chest pain, palpitations, dizziness or syncope. She has been found to have severe pulmonary fibrosis by CT scanning, which has been progressively worsening on repeat CT scans despite the fact that she has been on prednisone for polymyalgia rheumatica and for which she was seen by a rheumatologist who apparently discovered the pulmonary fibrosis on the CT scan. Despite the worsening CT findings of pulmonary fibrosis, her symptoms have remained to be very mild. Her pulmonologist has been considering a lung biopsy for the possibility of a different diagnosis other than pulmonary fibrosis. She has had leg edema and has been found to have nonobstructive venous thrombosis and has been placed on Eliquis. She had an echocardiogram on 11/09/2017, which demonstrated normal LV function with EF in the 60-65% range and  grade I diastolic dysfunction parameters. There was no significant valvular pathology. PA pressure was estimated at around 29 mmHg. Left atrial dimension was normal and so was the right atrial dimension. Right ventricular size was normal with normal systolic function. She is a nonsmoker. She has history of hypertension and diabetes mellitus. She also has a family history of coronary artery disease. Review of Systems   Constitutional: Negative for malaise/fatigue and weight loss. HENT: Negative for hearing loss. Eyes: Negative for blurred vision and double vision.    Respiratory: Positive for shortness of breath. Cardiovascular: Positive for leg swelling. Negative for chest pain, palpitations, orthopnea, claudication and PND. Gastrointestinal: Negative for blood in stool, heartburn and melena. Genitourinary: Negative for dysuria, frequency, hematuria and urgency. Musculoskeletal: Negative for back pain and joint pain. Skin: Negative for itching and rash. Neurological: Negative for dizziness, loss of consciousness and weakness. Psychiatric/Behavioral: Negative for depression and memory loss. Physical Exam   Constitutional: She is oriented to person, place, and time. She appears well-developed and well-nourished. HENT:   Head: Normocephalic and atraumatic. Eyes: Conjunctivae are normal. Pupils are equal, round, and reactive to light. Neck: Normal range of motion. Neck supple. No JVD present. Cardiovascular: Normal rate, regular rhythm, S1 normal and S2 normal.   No extrasystoles are present. PMI is not displaced. Exam reveals no gallop and no friction rub. No murmur heard. Pulses:       Carotid pulses are 3+ on the right side, and 3+ on the left side. Pulmonary/Chest: Effort normal. She has rales. Abdominal: Soft. There is no tenderness. Musculoskeletal: She exhibits edema. Neurological: She is alert and oriented to person, place, and time. No cranial nerve deficit. Skin: Skin is warm and dry. Psychiatric: She has a normal mood and affect.  Her behavior is normal.     Visit Vitals    /60    Pulse 64    Ht 5' 1\" (1.549 m)    Wt 69.4 kg (153 lb)    SpO2 95%    BMI 28.91 kg/m2       Past Medical History:   Diagnosis Date    Auto[facility] disease ([facility]     Breast cancer ([facility] 2001, 1/ 2014    right, left    Cancer ([facility]     Colon polyps     Elevated cholesterol     Enlarged lymph nodes     pericardium    GERD (gastroesophageal reflux disease)     Hypertension     Ill-defined condition 2007 to 2010    lung-ground glass followed by  JOVITAuniega    Insomnia     Lichen plano-pilaris     Mass of breast, right 1976    benign    Mass of breast, right 1994    benign    Pure hyperglyceridemia     Thyroid disease        Social History     Social History    Marital status:      Spouse name: N/A    Number of children: N/A    Years of education: N/A     Occupational History    Not on file. Social History Main Topics    Smoking status: Former Smoker     Packs/day: 2.00     Years: 9.00     Quit date: 1/1/1963    Smokeless tobacco: Never Used    Alcohol use 0.5 oz/week     1 Standard drinks or equivalent per week      Comment: rare    Drug use: No    Sexual activity: Not Currently     Other Topics Concern    Not on file     Social History Narrative       Family History   Problem Relation Age of Onset    Hypertension Mother     COPD Mother     Heart Disease Father        Past Surgical History:   Procedure Laterality Date    HX APPENDECTOMY      HX CATARACT REMOVAL Bilateral 2010    HX CHOLECYSTECTOMY      HX HEMORRHOIDECTOMY      HX HERNIA REPAIR  2004    small abdominal repair and fistula    HX HYSTERECTOMY  1991    cystocele    HX LYMPHADENECTOMY Right 7/2001    right axilla    HX MASTECTOMY Right 2001    with reconstruction    HX MASTECTOMY Left 1/14    HX POLYPECTOMY      [id number] muliple removals    HX RECTOCELE REPAIR  2003       Current Outpatient Prescriptions   Medication Sig Dispense Refill    predniSONE (DELTASONE) 5 mg tablet Take 2 tablets (10 mg) by mouth once daily. Decrease to 1 1/2 tablets (7.5 mg) by mouth once daily on October 15, 2017. On 11/7/2017, dose will be further reduced to 1 tablet (5 mg) by mouth once daily as directed by Dr. Kourtney Jimenez.  CALCIUM CITRATE/VITAMIN D3 (CITRACAL + D MAXIMUM PO) Take 2 Tabs by mouth daily.  folic acid 409 mcg tablet Take 800 mcg by mouth daily.  losartan (COZAAR) 100 mg tablet Take 1 Tab by mouth daily.  90 Tab 3    apixaban (ELIQUIS) 2.5 mg tablet Take 1 Tab by mouth two (2) times a day. 60 Tab 0    allopurinol (ZYLOPRIM) 100 mg tablet TAKE 1/2 TABLET BY MOUTH DAILY (Patient taking differently: Take 100 mg by mouth daily.) 45 Tab 3    simvastatin (ZOCOR) 20 mg tablet Take 1 Tab by mouth nightly. 90 Tab 3    raNITIdine (ZANTAC) 300 mg tablet Take 1 Tab by mouth daily. 90 Tab 3    doxepin (SINEQUAN) 25 mg capsule Take 1 Cap by mouth nightly. 90 Cap 3    metoprolol tartrate (LOPRESSOR) 50 mg tablet Take 1 Tab by mouth two (2) times a day. 180 Tab 3    levothyroxine (SYNTHROID) 100 mcg tablet Take 1 Tab by mouth nightly. 90 Tab 3    furosemide (LASIX) 20 mg tablet 1 tablet each AM 90 Tab 3    guanFACINE IR (TENEX) 1 mg IR tablet TAKE 1/2 TABLET BY MOUTH EVERY DAY. 45 Tab 3    peg 400-propylene glycol (SYSTANE, PROPYLENE GLYCOL,) 0.4-0.3 % drop Administer 1 Drop to both eyes as needed.  GLUCOSAMINE HCL/CHONDR LEGER A NA (OSTEO BI-FLEX PO) Take 2 Tabs by mouth daily.  cyanocobalamin 1,000 mcg tablet Take 1,000 mcg by mouth daily.  multivitamins-minerals-lutein (CENTRUM SILVER) tab tablet Take 1 Tab by mouth daily.  loratadine (CLARITIN) 10 mg tablet Take 10 mg by mouth daily.  tamoxifen (NOLVADEX) 20 mg tablet Take 20 mg by mouth daily.  clobetasol (OLUX) 0.05 % topical foam Apply  to affected area two (2) times daily as needed for Skin Irritation. use thin film on affected area         EKG: unchanged from previous tracings, normal sinus rhythm, nonspecific ST and T waves changes, RBBB, left axis deviation, rotation of the heart  . ASSESSMENT and PLAN  Encounter Diagnoses   Name Primary?  Abnormal ECG Yes    MACKEY (dyspnea on exertion)     Pulmonary fibrosis (HCC)     PMR (polymyalgia rheumatica) (HCC)     Essential hypertension     Pure hypercholesterolemia    This patient was referred to my office primarily for the evaluation of abnormal EKG.   Her EKG demonstrated a right bundle branch block with left axis deviation suggestive of anterior hemiblock. There was an interpretation of poor progression of R waves and suspicious anterior wall scar, which is most likely secondary to rotation of the heart rather than anterior wall scar. From the electrocardiographic point of view, there appears to be no contraindication for the scheduled lung biopsy. She does have increased risk factors for coronary artery disease, but she has been relatively asymptomatic from a cardiac standpoint. Her dyspnea on exertion appears to be predominantly secondary to her pulmonary fibrosis. The patient is very concerned about her increased risk factors for coronary artery disease and would like to have further evaluation prior to the lung biopsy. At this point, I would proceed with a stress test prior to proceeding with the lung biopsy. Her recent echocardiogram demonstrated normal left ventricular systolic function with no wall motion abnormalities, and this supports my impression that poor progression of R waves is related to rotation of the heart rather than anterior wall scar. If her pulmonologist decided to proceed with the lung biopsy without further cardiac diagnostic workup, I would not object to the decision. I spent over forty-five minutes with the patient, fifty percent of which was spent in counseling and discussion.

## 2017-11-13 NOTE — MR AVS SNAPSHOT
Visit Information Date & Time Provider Department Dept. Phone Encounter #  
 11/13/2017  2:20 PM Gage Marin MD [facility] [address] [id number] Your Appointments 11/17/2017 11:00 AM  
Follow Up with Carlo Boucher MD  
[address] [place] [address]) Appt Note: 2 weeks [address], [address]  
  
   
 [address], [address] [facility] [address] Upcoming Health Maintenance Date Due  
 GLAUCOMA SCREENING Q2Y 3/1/2018 MEDICARE YEARLY EXAM 9/26/2018 DTaP/Tdap/Td series (2 - Td) 10/20/2026 Allergies as of 11/13/2017  Review Complete On: 11/13/2017 By: Gage Marin MD  
  
 Severity Noted Reaction Type Reactions Codeine  08/04/2014    Hives Demerol [Meperidine]  08/04/2014    Nausea Only Levaquin [Levofloxacin]  08/04/2014    Other (comments) Swelling in feet and legs Current Immunizations  Reviewed on 9/22/2017 Name Date Influenza High Dose Vaccine PF 9/25/2017, 10/13/2016  8:53 AM  
 Influenza Vaccine 10/6/2015, 9/1/2013 12:00 AM  
 Pneumococcal Conjugate (PCV-13) 10/6/2015 Pneumococcal Polysaccharide (PPSV-23) 4/9/2010, 4/11/2003, 9/9/1993 Td 7/30/2008, 9/9/1993 Tdap 10/20/2016  8:40 AM  
 Zoster Vaccine, Live 1/14/2009 Not reviewed this visit You Were Diagnosed With   
  
 Codes Comments SOB (shortness of breath)    -  Primary ICD-10-CM: R06.02 
ICD-9-CM: 786.05   
 MACKEY (dyspnea on exertion)     ICD-10-CM: R06.09 
ICD-9-CM: 786.09 Vitals BP Pulse Height(growth percentile) Weight(growth percentile) SpO2 BMI  
 140/60 64 5' 1\" (1.549 m) 153 lb (69.4 kg) 95% 28.91 kg/m2 OB Status Smoking Status Hysterectomy Former Smoker Vitals History BMI and BSA Data Body Mass Index Body Surface Area  
 28.91 kg/m 2 1.73 m 2 Preferred Pharmacy Pharmacy Name Phone 305 [facility], [address] [place] [address] [address] Rah Marinelli Your Updated Medication List  
  
   
This list is accurate as of: 11/13/17  3:36 PM.  Always use your most recent med list.  
  
  
  
  
 allopurinol 100 mg tablet Commonly known as:  ZYLOPRIM  
TAKE 1/2 TABLET BY MOUTH DAILY  
  
 apixaban 2.5 mg tablet Commonly known as:  Khoa Andrade Take 1 Tab by mouth two (2) times a day. CENTRUM SILVER Tab tablet Generic drug:  multivitamins-minerals-lutein Take 1 Tab by mouth daily. CITRACAL + D MAXIMUM PO Take 2 Tabs by mouth daily. clobetasol 0.05 % topical foam  
Commonly known as:  OLUX Apply  to affected area two (2) times daily as needed for Skin Irritation. use thin film on affected area  
  
 cyanocobalamin 1,000 mcg tablet Take 1,000 mcg by mouth daily. doxepin 25 mg capsule Commonly known as:  SINEquan Take 1 Cap by mouth nightly. folic acid 366 mcg tablet Take 800 mcg by mouth daily. furosemide 20 mg tablet Commonly known as:  LASIX  
1 tablet each AM  
  
 guanFACINE IR 1 mg IR tablet Commonly known as:  TENEX  
TAKE 1/2 TABLET BY MOUTH EVERY DAY. levothyroxine 100 mcg tablet Commonly known as:  SYNTHROID Take 1 Tab by mouth nightly. loratadine 10 mg tablet Commonly known as:  Shellye Drape Take 10 mg by mouth daily. losartan 100 mg tablet Commonly known as:  COZAAR Take 1 Tab by mouth daily. metoprolol tartrate 50 mg tablet Commonly known as:  LOPRESSOR Take 1 Tab by mouth two (2) times a day. OSTEO BI-FLEX PO Take 2 Tabs by mouth daily. predniSONE 5 mg tablet Commonly known as:  Aleja Dunnnd Take 2 tablets (10 mg) by mouth once daily. Decrease to 1 1/2 tablets (7.5 mg) by mouth once daily on October 15, 2017. On 11/7/2017, dose will be further reduced to 1 tablet (5 mg) by mouth once daily as directed by Dr. Tone Schmidt. raNITIdine 300 mg tablet Commonly known as:  ZANTAC Take 1 Tab by mouth daily. simvastatin 20 mg tablet Commonly known as:  ZOCOR Take 1 Tab by mouth nightly. SYSTANE (PROPYLENE GLYCOL) 0.4-0.3 % Drop Generic drug:  peg 400-propylene glycol Administer 1 Drop to both eyes as needed. tamoxifen 20 mg tablet Commonly known as:  NOLVADEX Take 20 mg by mouth daily. We Performed the Following AMB POC EKG ROUTINE W/ 12 LEADS, INTER & REP [25861 CPT(R)] To-Do List   
 11/13/2017 ECG:  CARDIAC SPECT REST AND STRESS   
  
 11/13/2017 ECG:  NUCLEAR STRESS TEST   
  
 11/21/2017 8:15 AM  
  Appointment with HBV NUC CARD ROOM at [facility] NON-INVASIVE CARD ([phone]) This is a 2-part test which takes approximately 4 hours to complete. Please see part 2 of exam below for full instructions 11/21/2017 8:45 AM  
  Appointment with HBV NUC CARD ROOM at [facility] NON-INVASIVE CARD ([phone]) 1-No eating or coffee after midnight  2-Do not take diabetic meds (bring with) 3-Please take all other meds unless specified by cardiology Patient Instructions Continue current medications. If you have any further questions or concerns, please contact our office. [phone] Introducing [facility]! Dylan Rashid introduces [employer] patient portal. Now you can access parts of your medical record, email your doctor's office, and request medication refills online. 1. In your internet browser, go to [email or web address] 2. Click on the First Time User? Click Here link in the Sign In box. You will see the New Member Sign Up page. 3. Enter your [employer] Access Code exactly as it appears below. You will not need to use this code after youve completed the sign-up process. If you do not sign up before the expiration date, you must request a new code. · [employer] Access Code: [email or web address] Expires: 2/4/2018  9:23 AM 
 
 4. Enter the last four digits of your Social Security Number (xxxx) and Date of Birth (mm/dd/yyyy) as indicated and click Submit. You will be taken to the next sign-up page. 5. Create a Clover ID. This will be your [facility]over login ID and cannot be changed, so think of one that is secure and easy to remember. 6. Create a Clover password. You can change your password at any time. 7. Enter your Password Reset Question and Answer. This can be used at a later time if you forget your password. 8. Enter your e-mail address. You will receive e-mail notification when new information is available in [address] 9. Click Sign Up. You can now view and download portions of your medical record. 10. Click the Download Summary menu link to download a portable copy of your medical information. If you have questions, please visit the Frequently Asked Questions section of the Clover website. Remember, [employer]over is NOT to be used for urgent needs. For medical emergencies, dial 911. Now available from your iPhone and Android! Please provide this summary of care documentation to your next provider. Your primary care clinician is listed as Amaya Quinn. If you have any questions after today's visit, please call [phone].

## 2017-11-17 ENCOUNTER — OFFICE VISIT (OUTPATIENT)
Dept: INTERNAL MEDICINE CLINIC | Age: 82
End: 2017-11-17

## 2017-11-17 VITALS
SYSTOLIC BLOOD PRESSURE: 152 MMHG | TEMPERATURE: 98.2 F | BODY MASS INDEX: 28.7 KG/M2 | DIASTOLIC BLOOD PRESSURE: 72 MMHG | HEART RATE: 63 BPM | OXYGEN SATURATION: 98 % | WEIGHT: 152 LBS | RESPIRATION RATE: 16 BRPM | HEIGHT: 61 IN

## 2017-11-17 DIAGNOSIS — J84.10 PULMONARY FIBROSIS (HCC): Primary | ICD-10-CM

## 2017-11-17 DIAGNOSIS — I10 ESSENTIAL HYPERTENSION: ICD-10-CM

## 2017-11-17 DIAGNOSIS — E78.00 PURE HYPERCHOLESTEROLEMIA: ICD-10-CM

## 2017-11-17 DIAGNOSIS — M35.3 PMR (POLYMYALGIA RHEUMATICA) (HCC): ICD-10-CM

## 2017-11-17 NOTE — PROGRESS NOTES
1. Have you been to the ER, urgent care clinic since your last visit? Hospitalized since your last visit? No    2. Have you seen or consulted any other health care providers outside of the [address] [place] [address] since your last visit? Include any pap smears or colon screening.  Dr. Boom Arceke up from mastectomy

## 2017-11-17 NOTE — MR AVS SNAPSHOT
Visit Information Date & Time Provider Department Dept. Phone Encounter #  
 11/17/2017 11:00 AM Torrey Fierro MD [facility] INTERNAL MEDICINE OF Sharifa Ortiz [phone] [id number] Your Appointments 12/11/2017  1:15 PM  
Follow Up with Torrey Fierro MD  
[facility] INTERNAL MEDICINE OF [facility]) Appt Note: 1mo  
 [address] [facility], [address]  
  
   
 [address] [facility], [address] [facility]  
  
    
 5/7/2018 10:40 AM  
Follow Up with Rosalab Pfeiffer MD  
[facility] ([facility]) Appt Note: 6 month follow up [place] [address] [address] [address] [phone] [facility] [address] Upcoming Health Maintenance Date Due  
 GLAUCOMA SCREENING Q2Y 3/1/2018 MEDICARE YEARLY EXAM 9/26/2018 DTaP/Tdap/Td series (2 - Td) 10/20/2026 Allergies as of 11/17/2017  Review Complete On: 11/17/2017 By: Mal Parsons LPN Severity Noted Reaction Type Reactions Codeine  08/04/2014    Hives Demerol [Meperidine]  08/04/2014    Nausea Only Levaquin [Levofloxacin]  08/04/2014    Other (comments) Swelling in feet and legs Current Immunizations  Reviewed on 9/22/2017 Name Date Influenza High Dose Vaccine PF 9/25/2017, 10/13/2016  8:53 AM  
 Influenza Vaccine 10/6/2015, 9/1/2013 12:00 AM  
 Pneumococcal Conjugate (PCV-13) 10/6/2015 Pneumococcal Polysaccharide (PPSV-23) 4/9/2010, 4/11/2003, 9/9/1993 Td 7/30/2008, 9/9/1993 Tdap 10/20/2016  8:40 AM  
 Zoster Vaccine, Live 1/14/2009 Not reviewed this visit Vitals BP Pulse Temp Resp Height(growth percentile) 152/72 (BP 1 Location: Left arm, BP Patient Position: Sitting) 63 98.2 °F (36.8 °C) (Tympanic) 16 5' 1\" (1.549 m) Weight(growth percentile) SpO2 BMI OB Status Smoking Status 152 lb (68.9 kg) 98% 28.72 kg/m2 Hysterectomy Former Smoker Vitals History BMI and BSA Data Body Mass Index Body Surface Area 28.72 kg/m 2 1.72 m 2 Preferred Pharmacy Pharmacy Name Phone [address] [facility], [address] [place] Your Updated Medication List  
  
   
This list is accurate as of: 11/17/17 12:19 PM.  Always use your most recent med list.  
  
  
  
  
 allopurinol 100 mg tablet Commonly known as:  ZYLOPRIM  
TAKE 1/2 TABLET BY MOUTH DAILY  
  
 apixaban 2.5 mg tablet Commonly known as:  Shepherd Azalia Take 1 Tab by mouth two (2) times a day. CENTRUM SILVER Tab tablet Generic drug:  multivitamins-minerals-lutein Take 1 Tab by mouth daily. CITRACAL + D MAXIMUM PO Take 2 Tabs by mouth daily. clobetasol 0.05 % topical foam  
Commonly known as:  OLUX Apply  to affected area two (2) times daily as needed for Skin Irritation. use thin film on affected area  
  
 cyanocobalamin 1,000 mcg tablet Take 1,000 mcg by mouth daily. doxepin 25 mg capsule Commonly known as:  SINEquan Take 1 Cap by mouth nightly. folic acid 359 mcg tablet Take 800 mcg by mouth daily. furosemide 20 mg tablet Commonly known as:  LASIX  
1 tablet each AM  
  
 guanFACINE IR 1 mg IR tablet Commonly known as:  TENEX  
TAKE 1/2 TABLET BY MOUTH EVERY DAY. levothyroxine 100 mcg tablet Commonly known as:  SYNTHROID Take 1 Tab by mouth nightly. loratadine 10 mg tablet Commonly known as:  Jearline Austin Take 10 mg by mouth daily. losartan 100 mg tablet Commonly known as:  COZAAR Take 1 Tab by mouth daily. metoprolol tartrate 50 mg tablet Commonly known as:  LOPRESSOR Take 1 Tab by mouth two (2) times a day. OSTEO BI-FLEX PO Take 2 Tabs by mouth daily. predniSONE 5 mg tablet Commonly known as:  Hayley Yi  
 Take 2 tablets (10 mg) by mouth once daily. Decrease to 1 1/2 tablets (7.5 mg) by mouth once daily on October 15, 2017. On 11/7/2017, dose will be further reduced to 1 tablet (5 mg) by mouth once daily as directed by Dr. Alyssa Solorzano. raNITIdine 300 mg tablet Commonly known as:  ZANTAC Take 1 Tab by mouth daily. simvastatin 20 mg tablet Commonly known as:  ZOCOR Take 1 Tab by mouth nightly. SYSTANE (PROPYLENE GLYCOL) 0.4-0.3 % Drop Generic drug:  peg 400-propylene glycol Administer 1 Drop to both eyes as needed. tamoxifen 20 mg tablet Commonly known as:  NOLVADEX Take 20 mg by mouth daily. To-Do List   
 11/21/2017 8:15 AM  
  Appointment with [facility] at [facility] ([phone]) This is a 2-part test which takes approximately 4 hours to complete. Please see part 2 of exam below for full instructions 11/21/2017 8:45 AM  
  Appointment with [facility]UC [facility] ROOM at [facility] ([phone]) 1-No eating or coffee after midnight  2-Do not take diabetic meds (bring with) 3-Please take all other meds unless specified by cardiology Patient Instructions There are no preventive care reminders to display for this patient. Introducing [facility]! Alberto Jeong introduces CallAround patient portal. Now you can access parts of your medical record, email your doctor's office, and request medication refills online. 1. In your internet browser, go to [email or web address] 2. Click on the First Time User? Click Here link in the Sign In box. You will see the New Member Sign Up page. 3. Enter your CallAround Access Code exactly as it appears below. You will not need to use this code after youve completed the sign-up process. If you do not sign up before the expiration date, you must request a new code. · CallAround Access Code: [email or web address] Expires: 2/4/2018  9:23 AM 
 
 4. Enter the last four digits of your Social Security Number (xxxx) and Date of Birth (mm/dd/yyyy) as indicated and click Submit. You will be taken to the next sign-up page. 5. Create a [employer] ID. This will be your [employer] login ID and cannot be changed, so think of one that is secure and easy to remember. 6. Create a [employer] password. You can change your password at any time. 7. Enter your Password Reset Question and Answer. This can be used at a later time if you forget your password. 8. Enter your e-mail address. You will receive e-mail notification when new information is available in [address] 9. Click Sign Up. You can now view and download portions of your medical record. 10. Click the Download Summary menu link to download a portable copy of your medical information. If you have questions, please visit the Frequently Asked Questions section of the [employer] website. Remember, [employer] is NOT to be used for urgent needs. For medical emergencies, dial 911. Now available from your iPhone and Android! Please provide this summary of care documentation to your next provider. Your primary care clinician is listed as Alonzo Bautista. If you have any questions after today's visit, please call [phone].

## 2017-11-19 NOTE — PROGRESS NOTES
The patient presents to the office today with the chief complaint of pulmonary fibrosis    HPI    The patient has pulmonary fibrosis which is worsening on xrays. She is being evaluated for a possible lung biopsy. The patient remains on medications for hypertension and hyperlipidemia. The patient is tolerating the medications well. ROS    Allergies   Allergen Reactions    Codeine Hives    Demerol [Meperidine] Nausea Only    Levaquin [Levofloxacin] Other (comments)     Swelling in feet and legs       Current Outpatient Prescriptions   Medication Sig Dispense Refill    predniSONE (DELTASONE) 5 mg tablet Take 2 tablets (10 mg) by mouth once daily. Decrease to 1 1/2 tablets (7.5 mg) by mouth once daily on October 15, 2017. On 11/7/2017, dose will be further reduced to 1 tablet (5 mg) by mouth once daily as directed by Dr. Ines Mcadams.  CALCIUM CITRATE/VITAMIN D3 (CITRACAL + D MAXIMUM PO) Take 2 Tabs by mouth daily.  folic acid 381 mcg tablet Take 800 mcg by mouth daily.  losartan (COZAAR) 100 mg tablet Take 1 Tab by mouth daily. 90 Tab 3    apixaban (ELIQUIS) 2.5 mg tablet Take 1 Tab by mouth two (2) times a day. 60 Tab 0    allopurinol (ZYLOPRIM) 100 mg tablet TAKE 1/2 TABLET BY MOUTH DAILY (Patient taking differently: Take 100 mg by mouth daily.) 45 Tab 3    simvastatin (ZOCOR) 20 mg tablet Take 1 Tab by mouth nightly. 90 Tab 3    raNITIdine (ZANTAC) 300 mg tablet Take 1 Tab by mouth daily. 90 Tab 3    doxepin (SINEQUAN) 25 mg capsule Take 1 Cap by mouth nightly. 90 Cap 3    metoprolol tartrate (LOPRESSOR) 50 mg tablet Take 1 Tab by mouth two (2) times a day. 180 Tab 3    levothyroxine (SYNTHROID) 100 mcg tablet Take 1 Tab by mouth nightly. 90 Tab 3    furosemide (LASIX) 20 mg tablet 1 tablet each AM 90 Tab 3    guanFACINE IR (TENEX) 1 mg IR tablet TAKE 1/2 TABLET BY MOUTH EVERY DAY.  45 Tab 3    peg 400-propylene glycol (SYSTANE, PROPYLENE GLYCOL,) 0.4-0.3 % drop Administer 1 Drop to both eyes as needed.  GLUCOSAMINE HCL/CHONDR LEGER A NA (OSTEO BI-FLEX PO) Take 2 Tabs by mouth daily.  cyanocobalamin 1,000 mcg tablet Take 1,000 mcg by mouth daily.  multivitamins-minerals-lutein (CENTRUM SILVER) tab tablet Take 1 Tab by mouth daily.  loratadine (CLARITIN) 10 mg tablet Take 10 mg by mouth daily.  tamoxifen (NOLVADEX) 20 mg tablet Take 20 mg by mouth daily.  clobetasol (OLUX) 0.05 % topical foam Apply  to affected area two (2) times daily as needed for Skin Irritation. use thin film on affected area         Past Medical History:   Diagnosis Date    Autoimmune disease ([address]     Breast cancer ([address] 2001, 1/ 2014    right, left    Cancer ([address]     Colon polyps     Elevated cholesterol     Enlarged lymph nodes     pericardium    GERD (gastroesophageal reflux disease)     Hypertension     Ill-defined condition 2007 to 2010    lung-ground glass followed by Dr. Howard Aguirre of breast, right 1976    benign    Mass of breast, right 1994    benign    Pure hyperglyceridemia     Thyroid disease        Past Surgical History:   Procedure Laterality Date    HX APPENDECTOMY      HX CATARACT REMOVAL Bilateral 2010    HX CHOLECYSTECTOMY      HX HEMORRHOIDECTOMY      HX HERNIA REPAIR  2004    small abdominal repair and fistula    HX HYSTERECTOMY  1991    cystocele    HX LYMPHADENECTOMY Right 7/2001    right axilla    HX MASTECTOMY Right 2001    with reconstruction    HX MASTECTOMY Left 1/14    HX POLYPECTOMY      [id number] muliple removals    HX RECTOCELE REPAIR  2003       Social History     Social History    Marital status:      Spouse name: N/A    Number of children: N/A    Years of education: N/A     Occupational History    Not on file.      Social History Main Topics    Smoking status: Former Smoker     Packs/day: 2.00     Years: 9.00     Quit date: 1/1/1963    Smokeless tobacco: Never Used    Alcohol use 0.5 oz/week     1 Standard drinks or equivalent per week      Comment: rare    Drug use: No    Sexual activity: Not Currently     Other Topics Concern    Not on file     Social History Narrative       Patient does have an advanced directive on file    Visit Vitals    /72 (BP 1 Location: Left arm, BP Patient Position: Sitting)    Pulse 63    Temp 98.2 °F (36.8 °C) (Tympanic)    Resp 16    Ht 5' 1\" (1.549 m)    Wt 152 lb (68.9 kg)    SpO2 98%    BMI 28.72 kg/m2       Physical Exam   No Cervical Lymphadenopathy  No Supraclavicular Lymphadenopathy  Thyroid is Normal  Lungs are normal to percussion. Clear to auscultation   Heart:  S1 S2 are normal, No gallops, No mummers  No Carotid Bruits  Abdomen:  Normal Bowel Sounds. No tenderness. No masses. No Hepatomegaly or Splenomegly  LE:  Strong Pedal Pulses. No Edema      BMI:  The patient was advised to limit fat to 20% of the calories - approximately 60 grams      Hospital Outpatient Visit on 10/18/2017   Component Date Value Ref Range Status    Uric acid 10/18/2017 6.1  2.6 - 7.2 MG/DL Final    Comment: The drugs N-acetylcysteine (NAC) and  Metamiszole have been found to cause falsely  low results in this chemical assay. Please  be sure to submit blood samples obtained  BEFORE administration of either of these  drugs to assure correct results.       Sodium 10/18/2017 140  136 - 145 mmol/L Final    Potassium 10/18/2017 4.2  3.5 - 5.5 mmol/L Final    Chloride 10/18/2017 104  100 - 108 mmol/L Final    CO2 10/18/2017 28  21 - 32 mmol/L Final    Anion gap 10/18/2017 8  3.0 - 18 mmol/L Final    Glucose 10/18/2017 88  74 - 99 mg/dL Final    BUN 10/18/2017 36* 7.0 - 18 MG/DL Final    Creatinine 10/18/2017 1.35* 0.6 - 1.3 MG/DL Final    BUN/Creatinine ratio 10/18/2017 27* 12 - 20   Final    GFR est AA 10/18/2017 45* >60 ml/min/1.73m2 Final    GFR est non-AA 10/18/2017 37* >60 ml/min/1.73m2 Final    Comment: (NOTE)  Estimated GFR is calculated using the Modification of Diet in Renal   Disease (MDRD) Study equation, reported for both  Americans   (GFRAA) and non- Americans (GFRNA), and normalized to 1.73m2   body surface area. The physician must decide which value applies to   the patient. The MDRD study equation should only be used in   individuals age 25 or older. It has not been validated for the   following: pregnant women, patients with serious comorbid conditions,   or on certain medications, or persons with extremes of body size,   muscle mass, or nutritional status.  Calcium 10/18/2017 9.8  8.5 - 10.1 MG/DL Final       .No results found for any visits on 11/17/17. Assessment / Plan      ICD-10-CM ICD-9-CM    1. Pulmonary fibrosis ([address] J84.10 515    2. PMR (polymyalgia rheumatica) ([facility]) M35.3 725    3. Essential hypertension I10 401.9    4. Pure hypercholesterolemia E78.00 272.0        she was advised to continue her maintenance medications  Continue to follow up with Pulmonary      Follow-up Disposition:  Return in about 5 months (around 4/17/2018). I asked Jennifer Conway if she has any questions and I answered the questions. Lew Franco Conway states that she understands the treatment plan and agrees with the treatment plan

## 2017-11-21 ENCOUNTER — HOSPITAL ENCOUNTER (OUTPATIENT)
Dept: NON INVASIVE DIAGNOSTICS | Age: 82
Discharge: HOME | End: 2017-11-21
Attending: INTERNAL MEDICINE
Payer: MEDICARE

## 2017-11-21 DIAGNOSIS — R06.09 DOE (DYSPNEA ON EXERTION): ICD-10-CM

## 2017-11-21 LAB
ATTENDING PHYSICIAN, CST07: NORMAL
DIAGNOSIS, 93000: NORMAL
DUKE TM SCORE RESULT, CST14: NORMAL
DUKE TREADMILL SCORE, CST13: NORMAL
ECG INTERP BEFORE EX, CST11: NORMAL
ECG INTERP DURING EX, CST12: NORMAL
FUNCTIONAL CAPACITY, CST17: NORMAL
KNOWN CARDIAC CONDITION, CST08: NORMAL
MAX. DIASTOLIC BP, CST04: 80 MMHG
MAX. HEART RATE, CST05: 88 BPM
MAX. SYSTOLIC BP, CST03: 140 MMHG
OVERALL BP RESPONSE TO EXERCISE, CST16: NORMAL
OVERALL HR RESPONSE TO EXERCISE, CST15: NORMAL
PEAK EX METS, CST10: 1 METS
PROTOCOL NAME, CST01: NORMAL
TEST INDICATION, CST09: NORMAL

## 2017-11-21 PROCEDURE — 78452 HT MUSCLE IMAGE SPECT MULT: CPT | Performed by: INTERNAL MEDICINE

## 2017-11-21 PROCEDURE — A9500 TC99M SESTAMIBI: HCPCS

## 2017-11-21 PROCEDURE — 74011250636 HC RX REV CODE- 250/636: Performed by: INTERNAL MEDICINE

## 2017-11-21 PROCEDURE — 93017 CV STRESS TEST TRACING ONLY: CPT | Performed by: INTERNAL MEDICINE

## 2017-11-21 RX ORDER — SODIUM CHLORIDE 0.9 % (FLUSH) 0.9 %
10 SYRINGE (ML) INJECTION AS NEEDED
Status: COMPLETED | OUTPATIENT
Start: 2017-11-21 | End: 2017-11-21

## 2017-11-21 RX ADMIN — REGADENOSON 0.4 MG: 0.08 INJECTION, SOLUTION INTRAVENOUS at 09:15

## 2017-11-21 RX ADMIN — Medication 10 ML: at 08:15

## 2017-11-21 RX ADMIN — Medication 10 ML: at 09:15

## 2017-11-21 NOTE — PROGRESS NOTES
Patient was injected with 16.1 millicuries 36SNC Sestamibi on 11/21/17 at 0815. Patient was injected with 84.3 millicuries 36LAH Sestamibi on  11/21/17 at 0915. Patient's armbands were removed and placed in shred-it box.     Patient had a Nuclear Lexiscan Stress Test.

## 2017-11-22 NOTE — PROCEDURES
[address] STRESS    Name:  Ousmane Pena  MR#:  [id number]  :  1932  Account #:  [de-identified]  Date of Adm:  2017  Date of Service:      ORDERING PHYSICIAN: MANUEL Escobedo MD    PRIMARY CARE PHYSICIAN: Dr. Meng Gordon: Dyspnea on exertion, rule out anginal  equivalent. EKG RESTING: Normal sinus rhythm, rate 67. Left axis deviation. Complete right bundle branch block. Left anterior fascicular block. Left  ventricular hypertrophy with repolarization abnormalities. Poor R-wave  progression anterolaterally with possible lateral infarct. PROCEDURE: The patient received 10.5 mCi of technetium-99m  sestamibi intravenously and had a resting myocardial perfusion study  completed. She subsequently received Lexiscan 0.4 mg intravenously  followed by a 5 mL saline flush and was asked to move her legs while  seated for 3 minutes. She complained of shortness of breath with  Lexiscan administration, but had no significant electrocardiographic  changes or chest pain. She subsequently was given technetium-99  sestamibi intravenously and had a stress myocardial perfusion study  completed and compared to the resting study. FINDINGS: There appeared to be normal perfusion throughout the left  ventricular myocardium without signs of ischemia or infarction. Gated  SPECT imaging demonstrated low normal left ventricular volumes with  normal wall motion and hyperdynamic left ventricular function, ejection  fraction of 79%. There was a transient ischemic dilatation index at  1.10, which was felt to be spuriously high, after gross inspection of the  raw images. IMPRESSION:  1. Normal Lexiscan Cardiolite myocardial perfusion study. 2. Normal perfusion throughout the left ventricular myocardium without  signs of ischemia or infarction.   3. Low-normal left ventricular volumes with normal to somewhat  hyperdynamic wall motion and ejection fraction of 79% on gated  SPECT imaging. 4. Negative pharmacologic stress test electrocardiographically. 5. This was a low risk Lexiscan Cardiolite myocardial perfusion study.         Vicente Arauz DO    ES / MAURA  D:  11/21/2017   18:16  T:  11/22/2017   05:53  Job #:  [id number]

## 2017-11-27 ENCOUNTER — TELEPHONE (OUTPATIENT)
Dept: CARDIOLOGY CLINIC | Age: 82
End: 2017-11-27

## 2017-11-27 NOTE — TELEPHONE ENCOUNTER
----- Message from Dalila Guillen MD sent at 11/27/2017 11:00 AM EST -----  Clear her for lung biopsy.

## 2017-12-18 ENCOUNTER — HOSPITAL ENCOUNTER (OUTPATIENT)
Dept: LAB | Age: 82
Discharge: HOME | End: 2017-12-18
Payer: MEDICARE

## 2017-12-18 ENCOUNTER — OFFICE VISIT (OUTPATIENT)
Dept: INTERNAL MEDICINE CLINIC | Age: 82
End: 2017-12-18

## 2017-12-18 VITALS
HEART RATE: 78 BPM | RESPIRATION RATE: 16 BRPM | BODY MASS INDEX: 28.89 KG/M2 | TEMPERATURE: 98.5 F | DIASTOLIC BLOOD PRESSURE: 84 MMHG | SYSTOLIC BLOOD PRESSURE: 148 MMHG | HEIGHT: 61 IN | OXYGEN SATURATION: 98 % | WEIGHT: 153 LBS

## 2017-12-18 DIAGNOSIS — M35.3 PMR (POLYMYALGIA RHEUMATICA) (HCC): ICD-10-CM

## 2017-12-18 DIAGNOSIS — L85.3 DRY SKIN DERMATITIS: Primary | ICD-10-CM

## 2017-12-18 DIAGNOSIS — I10 ESSENTIAL HYPERTENSION: ICD-10-CM

## 2017-12-18 PROCEDURE — 80048 BASIC METABOLIC PNL TOTAL CA: CPT | Performed by: INTERNAL MEDICINE

## 2017-12-18 RX ORDER — TRIAMCINOLONE ACETONIDE 1 MG/G
CREAM TOPICAL
Qty: 45 G | Refills: 1 | Status: SHIPPED | OUTPATIENT
Start: 2017-12-18

## 2017-12-18 NOTE — PROGRESS NOTES
The patient presents to the office today with the chief complaint of itching    HPI    The patient complains of dry skin with itching. The problem has been present for the past 2 months. The has been using Dove soap and cool showers. The patient remains on medications for PMR. This is in fair control      Review of Systems   Respiratory: Negative for shortness of breath. Cardiovascular: Negative for chest pain and leg swelling. Allergies   Allergen Reactions    Codeine Hives    Demerol [Meperidine] Nausea Only    Levaquin [Levofloxacin] Other (comments)     Swelling in feet and legs       Current Outpatient Prescriptions   Medication Sig Dispense Refill    triamcinolone acetonide (KENALOG) 0.1 % topical cream Apply daily 45 g 1    predniSONE (DELTASONE) 5 mg tablet Take 2 tablets (10 mg) by mouth once daily. Decrease to 1 1/2 tablets (7.5 mg) by mouth once daily on October 15, 2017. On 11/7/2017, dose will be further reduced to 1 tablet (5 mg) by mouth once daily as directed by Dr. Napoleon Pacheco.  CALCIUM CITRATE/VITAMIN D3 (CITRACAL + D MAXIMUM PO) Take 2 Tabs by mouth daily.  folic acid 482 mcg tablet Take 800 mcg by mouth daily.  losartan (COZAAR) 100 mg tablet Take 1 Tab by mouth daily. 90 Tab 3    apixaban (ELIQUIS) 2.5 mg tablet Take 1 Tab by mouth two (2) times a day. 60 Tab 0    allopurinol (ZYLOPRIM) 100 mg tablet TAKE 1/2 TABLET BY MOUTH DAILY (Patient taking differently: Take 100 mg by mouth daily.) 45 Tab 3    simvastatin (ZOCOR) 20 mg tablet Take 1 Tab by mouth nightly. 90 Tab 3    raNITIdine (ZANTAC) 300 mg tablet Take 1 Tab by mouth daily. 90 Tab 3    doxepin (SINEQUAN) 25 mg capsule Take 1 Cap by mouth nightly. 90 Cap 3    metoprolol tartrate (LOPRESSOR) 50 mg tablet Take 1 Tab by mouth two (2) times a day. 180 Tab 3    levothyroxine (SYNTHROID) 100 mcg tablet Take 1 Tab by mouth nightly.  90 Tab 3    furosemide (LASIX) 20 mg tablet 1 tablet each AM 90 Tab 3    guanFACINE IR (TENEX) 1 mg IR tablet TAKE 1/2 TABLET BY MOUTH EVERY DAY. 45 Tab 3    peg 400-propylene glycol (SYSTANE, PROPYLENE GLYCOL,) 0.4-0.3 % drop Administer 1 Drop to both eyes as needed.  GLUCOSAMINE HCL/CHONDR LEGER A NA (OSTEO BI-FLEX PO) Take 2 Tabs by mouth daily.  cyanocobalamin 1,000 mcg tablet Take 1,000 mcg by mouth daily.  multivitamins-minerals-lutein (CENTRUM SILVER) tab tablet Take 1 Tab by mouth daily.  loratadine (CLARITIN) 10 mg tablet Take 10 mg by mouth daily.  tamoxifen (NOLVADEX) 20 mg tablet Take 20 mg by mouth daily.  clobetasol (OLUX) 0.05 % topical foam Apply  to affected area two (2) times daily as needed for Skin Irritation. use thin film on affected area         Past Medical History:   Diagnosis Date    Autoimmune disease ([address]     [facility] cancer [facility] 2001, 1/ 2014    right, left    Cancer ([facility]     Colon polyps     Elevated cholesterol     Enlarged lymph nodes     pericardium    GERD (gastroesophageal reflux disease)     Hypertension     Ill-defined condition 2007 to 2010    lung-ground glass followed by Dr. Emmanuelle El of breast, right 1976    benign    Mass of breast, right 1994    benign    Pure hyperglyceridemia     Thyroid disease        Past Surgical History:   Procedure Laterality Date    HX APPENDECTOMY      HX CATARACT REMOVAL Bilateral 2010    HX CHOLECYSTECTOMY      HX HEMORRHOIDECTOMY      HX HERNIA REPAIR  2004    small abdominal repair and fistula    HX HYSTERECTOMY  1991    cystocele    HX LYMPHADENECTOMY Right 7/2001    right axilla    HX MASTECTOMY Right 2001    with reconstruction    HX MASTECTOMY Left 1/14    HX POLYPECTOMY      [id number] muliple removals    HX RECTOCELE REPAIR  2003       Social History     Social History    Marital status:      Spouse name: N/A    Number of children: N/A    Years of education: N/A     Occupational History    Not on file. Social History Main Topics    Smoking status: Former Smoker     Packs/day: 2.00     Years: 9.00     Quit date: 1/1/1963    Smokeless tobacco: Never Used    Alcohol use 0.5 oz/week     1 Standard drinks or equivalent per week      Comment: rare    Drug use: No    Sexual activity: Not Currently     Other Topics Concern    Not on file     Social History Narrative       Patient does have an advanced directive on file    Visit Vitals    /84 (BP 1 Location: Right arm, BP Patient Position: Sitting)    Pulse 78    Temp 98.5 °F (36.9 °C) (Tympanic)    Resp 16    Ht 5' 1\" (1.549 m)    Wt 153 lb (69.4 kg)    SpO2 98%    BMI 28.91 kg/m2       Physical Exam    BMI:  [facility] Outpatient Visit on 12/18/2017   Component Date Value Ref Range Status    Sodium 12/18/2017 139  136 - 145 mmol/L Final    Potassium 12/18/2017 3.9  3.5 - 5.5 mmol/L Final    Chloride 12/18/2017 101  100 - 108 mmol/L Final    CO2 12/18/2017 PENDING  mmol/L Incomplete    Anion gap 12/18/2017 PENDING  mmol/L Incomplete    Glucose 12/18/2017 176* 74 - 99 mg/dL Final    BUN 12/18/2017 44* 7.0 - 18 MG/DL Final    Creatinine 12/18/2017 1.67* 0.6 - 1.3 MG/DL Final    BUN/Creatinine ratio 12/18/2017 26* 12 - 20   Final    GFR est AA 12/18/2017 35* >60 ml/min/1.73m2 Final    GFR est non-AA 12/18/2017 29* >60 ml/min/1.73m2 Final    Comment: (NOTE)  Estimated GFR is calculated using the Modification of Diet in Renal   Disease (MDRD) Study equation, reported for both  Americans   (GFRAA) and non- Americans (GFRNA), and normalized to 1.73m2   body surface area. The physician must decide which value applies to   the patient. The MDRD study equation should only be used in   individuals age 25 or older. It has not been validated for the   following: pregnant women, patients with serious comorbid conditions,   or on certain medications, or persons with extremes of body size,   muscle mass, or nutritional status.       Calcium 12/18/2017 9.4  8.5 - 10.1 MG/DL Final   Hospital Outpatient Visit on 11/21/2017   Component Date Value Ref Range Status    Test indication 11/21/2017 Dyspnea on Exertion   Preliminary    Functional capacity 11/21/2017 Could Not Be Adequately Assessed   Preliminary    ECG Interp. Before Exercise 11/21/2017    Preliminary                    Value:Normal Sinus Rhythm  Left Axis Deviation  right bundle branch block  LVH with early repolorization  Poss. Lateral infarct age undetermined      ECG Interp. During Exercise 11/21/2017 none   Preliminary    Max. Systolic BP 30/35/6453 179  mmHg Preliminary    Max. Diastolic BP 32/19/7702 80  mmHg Preliminary    Max. Heart rate 11/21/2017 88  BPM Preliminary    Peak Ex METs 11/21/2017 1.0  METS Preliminary    Protocol name 11/21/2017 [facility] Outpatient Visit on 10/18/2017   Component Date Value Ref Range Status    Uric acid 10/18/2017 6.1  2.6 - 7.2 MG/DL Final    Comment: The drugs N-acetylcysteine (NAC) and  Metamiszole have been found to cause falsely  low results in this chemical assay. Please  be sure to submit blood samples obtained  BEFORE administration of either of these  drugs to assure correct results.       Sodium 10/18/2017 140  136 - 145 mmol/L Final    Potassium 10/18/2017 4.2  3.5 - 5.5 mmol/L Final    Chloride 10/18/2017 104  100 - 108 mmol/L Final    CO2 10/18/2017 28  21 - 32 mmol/L Final    Anion gap 10/18/2017 8  3.0 - 18 mmol/L Final    Glucose 10/18/2017 88  74 - 99 mg/dL Final    BUN 10/18/2017 36* 7.0 - 18 MG/DL Final    Creatinine 10/18/2017 1.35* 0.6 - 1.3 MG/DL Final    BUN/Creatinine ratio 10/18/2017 27* 12 - 20   Final    GFR est AA 10/18/2017 45* >60 ml/min/1.73m2 Final    GFR est non-AA 10/18/2017 37* >60 ml/min/1.73m2 Final    Comment: (NOTE)  Estimated GFR is calculated using the Modification of Diet in Renal   Disease (MDRD) Study equation, reported for both  Americans [facility] and non- Americans (GFRNA), and normalized to 1.73m2   body surface area. The physician must decide which value applies to   the patient. The MDRD study equation should only be used in   individuals age 25 or older. It has not been validated for the   following: pregnant women, patients with serious comorbid conditions,   or on certain medications, or persons with extremes of body size,   muscle mass, or nutritional status.  Calcium 10/18/2017 9.8  8.5 - 10.1 MG/DL Final       .  Results for orders placed or performed during the hospital encounter of 12/27/36   METABOLIC PANEL, BASIC   Result Value Ref Range    Sodium 139 136 - 145 mmol/L    Potassium 3.9 3.5 - 5.5 mmol/L    Chloride 101 100 - 108 mmol/L    CO2 PENDING mmol/L    Anion gap PENDING mmol/L    Glucose 176 (H) 74 - 99 mg/dL    BUN 44 (H) 7.0 - 18 MG/DL    Creatinine 1.67 (H) 0.6 - 1.3 MG/DL    BUN/Creatinine ratio 26 (H) 12 - 20      GFR est AA 35 (L) >60 ml/min/1.73m2    GFR est non-AA 29 (L) >60 ml/min/1.73m2    Calcium 9.4 8.5 - 10.1 MG/DL       Assessment / Plan      ICD-10-CM ICD-9-CM    1. Dry skin dermatitis L85.3 692.89    2. Essential hypertension T45 209.2 METABOLIC PANEL, BASIC   3. PMR (polymyalgia rheumatica) ([facility]) M35.3 725      Moisturizing measures  Triamcinolone cream  she was advised to continue her maintenance medications  she is to call if symptoms persist over 10 days      Follow-up Disposition:  Return in about 3 months (around 3/18/2018). I asked Kevin Foote. Candi Middleton if she has any questions and I answered the questions. Joey Middleton states that she understands the treatment plan and agrees with the treatment plan

## 2017-12-18 NOTE — MR AVS SNAPSHOT
Visit Information Date & Time Provider Department Dept. Phone Encounter #  
 12/18/2017  2:00 PM Calvin Stephenson MD [facility] INTERNAL MEDICINE OF Robbie Patel [phone] [id number] Follow-up Instructions Return in about 3 months (around 3/18/2018). Your Appointments 5/7/2018 10:40 AM  
Follow Up with Deanna Sellers MD  
[facility] ([facility]) Appt Note: 6 month follow up [facility] [id number] [address] [address] [phone] [facility] [address] Upcoming Health Maintenance Date Due  
 GLAUCOMA SCREENING Q2Y 3/1/2018 MEDICARE YEARLY EXAM 9/26/2018 DTaP/Tdap/Td series (2 - Td) 10/20/2026 Allergies as of 12/18/2017  Review Complete On: 12/18/2017 By: Calvin Stephenson MD  
  
 Severity Noted Reaction Type Reactions Codeine  08/04/2014    Hives Demerol [Meperidine]  08/04/2014    Nausea Only Levaquin [Levofloxacin]  08/04/2014    Other (comments) Swelling in feet and legs Current Immunizations  Reviewed on 12/15/2017 Name Date Influenza High Dose Vaccine PF 9/25/2017, 10/13/2016  8:53 AM  
 Influenza Vaccine 10/6/2015, 9/1/2013 12:00 AM  
 Pneumococcal Conjugate (PCV-13) 10/6/2015 Pneumococcal Polysaccharide (PPSV-23) 10/14/2015, 4/9/2010, 4/11/2003, 9/9/1993 Td 7/30/2008, 9/9/1993 Tdap 10/20/2016  8:40 AM  
 Zoster Vaccine, Live 10/16/2017, 1/14/2009 Not reviewed this visit You Were Diagnosed With   
  
 Codes Comments Essential hypertension    -  Primary ICD-10-CM: I10 
ICD-9-CM: 401.9 Vitals BP Pulse Temp Resp Height(growth percentile) 160/78 (BP 1 Location: Left arm, BP Patient Position: Sitting) 76 98.5 °F (36.9 °C) (Tympanic) 16 5' 1\" (1.549 m) Weight(growth percentile) SpO2 BMI OB Status Smoking Status 153 lb (69.4 kg) 98% 28.91 kg/m2 Hysterectomy Former Smoker Vitals History BMI and BSA Data Body Mass Index Body Surface Area  
 28.91 kg/m 2 1.73 m 2 Preferred Pharmacy Pharmacy Name Phone [facility] Angel Perdomo [address] [place] [address] [phone] Your Updated Medication List  
  
   
This list is accurate as of: 12/18/17  3:15 PM.  Always use your most recent med list.  
  
  
  
  
 allopurinol 100 mg tablet Commonly known as:  ZYLOPRIM  
TAKE 1/2 TABLET BY MOUTH DAILY  
  
 apixaban 2.5 mg tablet Commonly known as:  Jodelle Patient Take 1 Tab by mouth two (2) times a day. CENTRUM SILVER Tab tablet Generic drug:  multivitamins-minerals-lutein Take 1 Tab by mouth daily. CITRACAL + D MAXIMUM PO Take 2 Tabs by mouth daily. clobetasol 0.05 % topical foam  
Commonly known as:  OLUX Apply  to affected area two (2) times daily as needed for Skin Irritation. use thin film on affected area  
  
 cyanocobalamin 1,000 mcg tablet Take 1,000 mcg by mouth daily. doxepin 25 mg capsule Commonly known as:  SINEquan Take 1 Cap by mouth nightly. folic acid 850 mcg tablet Take 800 mcg by mouth daily. furosemide 20 mg tablet Commonly known as:  LASIX  
1 tablet each AM  
  
 guanFACINE IR 1 mg IR tablet Commonly known as:  TENEX  
TAKE 1/2 TABLET BY MOUTH EVERY DAY. levothyroxine 100 mcg tablet Commonly known as:  SYNTHROID Take 1 Tab by mouth nightly. loratadine 10 mg tablet Commonly known as:  [place] Take 10 mg by mouth daily. losartan 100 mg tablet Commonly known as:  COZAAR Take 1 Tab by mouth daily. metoprolol tartrate 50 mg tablet Commonly known as:  LOPRESSOR Take 1 Tab by mouth two (2) times a day. OSTEO BI-FLEX PO Take 2 Tabs by mouth daily. predniSONE 5 mg tablet Commonly known as:  Orlean Aas Take 2 tablets (10 mg) by mouth once daily.   Decrease to 1 1/2 tablets (7.5 mg) by mouth once daily on October 15, 2017. On 11/7/2017, dose will be further reduced to 1 tablet (5 mg) by mouth once daily as directed by Dr. Adelita Hatchet. raNITIdine 300 mg tablet Commonly known as:  ZANTAC Take 1 Tab by mouth daily. simvastatin 20 mg tablet Commonly known as:  ZOCOR Take 1 Tab by mouth nightly. SYSTANE (PROPYLENE GLYCOL) 0.4-0.3 % Drop Generic drug:  peg 400-propylene glycol Administer 1 Drop to both eyes as needed. tamoxifen 20 mg tablet Commonly known as:  NOLVADEX Take 20 mg by mouth daily. triamcinolone acetonide 0.1 % topical cream  
Commonly known as:  KENALOG Apply daily Prescriptions Sent to Pharmacy Refills  
 triamcinolone acetonide (KENALOG) 0.1 % topical cream 1 Sig: Apply daily Class: Normal  
 Pharmacy: [facility] [address] [place] [address] Ph #: [phone] Follow-up Instructions Return in about 3 months (around 3/18/2018). Patient Instructions There are no preventive care reminders to display for this patient. Introducing [facility]! Linda Machado introduces IZ[employer]-collecte patient portal. Now you can access parts of your medical record, email your doctor's office, and request medication refills online. 1. In your internet browser, go to [email or web address] 2. Click on the First Time User? Click Here link in the Sign In box. You will see the New Member Sign Up page. 3. Enter your IZ[employer]-collecte Access Code exactly as it appears below. You will not need to use this code after youve completed the sign-up process. If you do not sign up before the expiration date, you must request a new code. · IZ[employer]-collecte Access Code: [email or web address] Expires: 2/4/2018  9:23 AM 
 
4. Enter the last four digits of your Social Security Number (xxxx) and Date of Birth (mm/dd/yyyy) as indicated and click Submit.  You will be taken to the next sign-up page. 5. Create a Tigermed ID. This will be your Tigermed login ID and cannot be changed, so think of one that is secure and easy to remember. 6. Create a Tigermed password. You can change your password at any time. 7. Enter your Password Reset Question and Answer. This can be used at a later time if you forget your password. 8. Enter your e-mail address. You will receive e-mail notification when new information is available in [address] 9. Click Sign Up. You can now view and download portions of your medical record. 10. Click the Download Summary menu link to download a portable copy of your medical information. If you have questions, please visit the Frequently Asked Questions section of the Tigermed website. Remember, Tigermed is NOT to be used for urgent needs. For medical emergencies, dial 911. Now available from your iPhone and Android! Please provide this summary of care documentation to your next provider. Your primary care clinician is listed as Zay Dailey. If you have any questions after today's visit, please call [phone].

## 2017-12-18 NOTE — PROGRESS NOTES
1. Have you been to the ER, urgent care clinic since your last visit? Hospitalized since your last visit? No    2. Have you seen or consulted any other health care providers outside of the [address] [place] [address] since your last visit? Include any pap smears or colon screening.  No

## 2017-12-19 LAB
ANION GAP SERPL CALC-SCNC: 9 MMOL/L (ref 3–18)
BUN SERPL-MCNC: 44 MG/DL (ref 7–18)
BUN/CREAT SERPL: 26 (ref 12–20)
CALCIUM SERPL-MCNC: 9.4 MG/DL (ref 8.5–10.1)
CHLORIDE SERPL-SCNC: 101 MMOL/L (ref 100–108)
CO2 SERPL-SCNC: 29 MMOL/L (ref 21–32)
CREAT SERPL-MCNC: 1.67 MG/DL (ref 0.6–1.3)
GLUCOSE SERPL-MCNC: 176 MG/DL (ref 74–99)
POTASSIUM SERPL-SCNC: 3.9 MMOL/L (ref 3.5–5.5)
SODIUM SERPL-SCNC: 139 MMOL/L (ref 136–145)

## 2018-01-23 RX ORDER — GUANFACINE HYDROCHLORIDE 1 MG/1
TABLET ORAL
Qty: 45 TAB | Refills: 3 | Status: SHIPPED | OUTPATIENT
Start: 2018-01-23 | End: 2018-10-29

## 2018-02-20 ENCOUNTER — ANESTHESIA EVENT (OUTPATIENT)
Dept: ENDOSCOPY | Age: 83
End: 2018-02-20

## 2018-02-21 ENCOUNTER — HOSPITAL ENCOUNTER (OUTPATIENT)
Age: 83
Setting detail: OUTPATIENT SURGERY
Discharge: HOME | End: 2018-02-21
Attending: INTERNAL MEDICINE | Admitting: INTERNAL MEDICINE
Payer: MEDICARE

## 2018-02-21 ENCOUNTER — ANESTHESIA (OUTPATIENT)
Dept: ENDOSCOPY | Age: 83
End: 2018-02-21

## 2018-02-21 VITALS
HEIGHT: 60 IN | TEMPERATURE: 97.4 F | SYSTOLIC BLOOD PRESSURE: 123 MMHG | DIASTOLIC BLOOD PRESSURE: 62 MMHG | OXYGEN SATURATION: 94 % | HEART RATE: 68 BPM | RESPIRATION RATE: 16 BRPM | WEIGHT: 145 LBS | BODY MASS INDEX: 28.47 KG/M2

## 2018-02-21 LAB
APTT PPP: 24.1 SEC (ref 23–36.4)
BRONCH. LAVAGE DIFF.,BR: NORMAL
EOSINOPHIL NFR BRONCH MANUAL: 12 %
ERYTHROCYTE [DISTWIDTH] IN BLOOD BY AUTOMATED COUNT: 13.7 % (ref 11.6–14.5)
HCT VFR BLD AUTO: 32.3 % (ref 35–45)
HGB BLD-MCNC: 10.6 G/DL (ref 12–16)
INR PPP: 1.1 (ref 0.8–1.2)
LYMPHOCYTES NFR BRONCH MANUAL: 34 %
MACROPHAGES NFR BRONCH MANUAL: 22 %
MCH RBC QN AUTO: 31.6 PG (ref 24–34)
MCHC RBC AUTO-ENTMCNC: 32.8 G/DL (ref 31–37)
MCV RBC AUTO: 96.4 FL (ref 74–97)
NEUTROPHILS NFR BRONCH MANUAL: 32 %
PLATELET # BLD AUTO: 210 K/UL (ref 135–420)
PMV BLD AUTO: 9.4 FL (ref 9.2–11.8)
PROTHROMBIN TIME: 13.4 SEC (ref 11.5–15.2)
RBC # BLD AUTO: 3.35 M/UL (ref 4.2–5.3)
WBC # BLD AUTO: 7.2 K/UL (ref 4.6–13.2)

## 2018-02-21 PROCEDURE — 87102 FUNGUS ISOLATION CULTURE: CPT | Performed by: INTERNAL MEDICINE

## 2018-02-21 PROCEDURE — 88312 SPECIAL STAINS GROUP 1: CPT | Performed by: INTERNAL MEDICINE

## 2018-02-21 PROCEDURE — 85730 THROMBOPLASTIN TIME PARTIAL: CPT | Performed by: INTERNAL MEDICINE

## 2018-02-21 PROCEDURE — 77030012699 HC VLV SUC CNTRL OCOA -A: Performed by: INTERNAL MEDICINE

## 2018-02-21 PROCEDURE — 85027 COMPLETE CBC AUTOMATED: CPT | Performed by: INTERNAL MEDICINE

## 2018-02-21 PROCEDURE — 74011000250 HC RX REV CODE- 250: Performed by: INTERNAL MEDICINE

## 2018-02-21 PROCEDURE — 76040000019: Performed by: INTERNAL MEDICINE

## 2018-02-21 PROCEDURE — 87070 CULTURE OTHR SPECIMN AEROBIC: CPT | Performed by: INTERNAL MEDICINE

## 2018-02-21 PROCEDURE — 89051 BODY FLUID CELL COUNT: CPT | Performed by: INTERNAL MEDICINE

## 2018-02-21 PROCEDURE — 77030013140 HC MSK NEB VYRM -A: Performed by: INTERNAL MEDICINE

## 2018-02-21 PROCEDURE — 99152 MOD SED SAME PHYS/QHP 5/>YRS: CPT

## 2018-02-21 PROCEDURE — 77030022556 HC FCPS BIOP TIS ENDOSC BSC -B: Performed by: INTERNAL MEDICINE

## 2018-02-21 PROCEDURE — 88112 CYTOPATH CELL ENHANCE TECH: CPT | Performed by: INTERNAL MEDICINE

## 2018-02-21 PROCEDURE — 74011000250 HC RX REV CODE- 250: Performed by: NURSE ANESTHETIST, CERTIFIED REGISTERED

## 2018-02-21 PROCEDURE — 87116 MYCOBACTERIA CULTURE: CPT | Performed by: INTERNAL MEDICINE

## 2018-02-21 PROCEDURE — 85610 PROTHROMBIN TIME: CPT | Performed by: INTERNAL MEDICINE

## 2018-02-21 PROCEDURE — 74011250636 HC RX REV CODE- 250/636: Performed by: NURSE ANESTHETIST, CERTIFIED REGISTERED

## 2018-02-21 PROCEDURE — 74011250636 HC RX REV CODE- 250/636

## 2018-02-21 RX ORDER — LIDOCAINE HYDROCHLORIDE 20 MG/ML
1.5 INJECTION, SOLUTION EPIDURAL; INFILTRATION; INTRACAUDAL; PERINEURAL ONCE
Status: DISCONTINUED | OUTPATIENT
Start: 2018-02-21 | End: 2018-02-21 | Stop reason: HOSPADM

## 2018-02-21 RX ORDER — FLUMAZENIL 0.1 MG/ML
0.2 INJECTION INTRAVENOUS
Status: DISCONTINUED | OUTPATIENT
Start: 2018-02-21 | End: 2018-02-21 | Stop reason: HOSPADM

## 2018-02-21 RX ORDER — INSULIN LISPRO 100 [IU]/ML
INJECTION, SOLUTION INTRAVENOUS; SUBCUTANEOUS ONCE
Status: DISCONTINUED | OUTPATIENT
Start: 2018-02-21 | End: 2018-02-21 | Stop reason: SDUPTHER

## 2018-02-21 RX ORDER — SODIUM CHLORIDE 0.9 % (FLUSH) 0.9 %
5-10 SYRINGE (ML) INJECTION AS NEEDED
Status: DISCONTINUED | OUTPATIENT
Start: 2018-02-21 | End: 2018-02-21 | Stop reason: SDUPTHER

## 2018-02-21 RX ORDER — MIDAZOLAM HYDROCHLORIDE 1 MG/ML
1 INJECTION, SOLUTION INTRAMUSCULAR; INTRAVENOUS
Status: DISCONTINUED | OUTPATIENT
Start: 2018-02-21 | End: 2018-02-21 | Stop reason: HOSPADM

## 2018-02-21 RX ORDER — SODIUM CHLORIDE, SODIUM LACTATE, POTASSIUM CHLORIDE, CALCIUM CHLORIDE 600; 310; 30; 20 MG/100ML; MG/100ML; MG/100ML; MG/100ML
75 INJECTION, SOLUTION INTRAVENOUS CONTINUOUS
Status: DISCONTINUED | OUTPATIENT
Start: 2018-02-21 | End: 2018-02-21 | Stop reason: SDUPTHER

## 2018-02-21 RX ORDER — SODIUM CHLORIDE, SODIUM LACTATE, POTASSIUM CHLORIDE, CALCIUM CHLORIDE 600; 310; 30; 20 MG/100ML; MG/100ML; MG/100ML; MG/100ML
75 INJECTION, SOLUTION INTRAVENOUS CONTINUOUS
Status: DISCONTINUED | OUTPATIENT
Start: 2018-02-21 | End: 2018-02-21 | Stop reason: HOSPADM

## 2018-02-21 RX ORDER — SODIUM CHLORIDE 0.9 % (FLUSH) 0.9 %
5-10 SYRINGE (ML) INJECTION EVERY 8 HOURS
Status: DISCONTINUED | OUTPATIENT
Start: 2018-02-21 | End: 2018-02-21 | Stop reason: SDUPTHER

## 2018-02-21 RX ORDER — SODIUM CHLORIDE 0.9 % (FLUSH) 0.9 %
5-10 SYRINGE (ML) INJECTION EVERY 8 HOURS
Status: DISCONTINUED | OUTPATIENT
Start: 2018-02-21 | End: 2018-02-21 | Stop reason: HOSPADM

## 2018-02-21 RX ORDER — SODIUM CHLORIDE 9 MG/ML
50 INJECTION, SOLUTION INTRAVENOUS CONTINUOUS
Status: DISCONTINUED | OUTPATIENT
Start: 2018-02-21 | End: 2018-02-21 | Stop reason: HOSPADM

## 2018-02-21 RX ORDER — NALOXONE HYDROCHLORIDE 0.4 MG/ML
0.4 INJECTION, SOLUTION INTRAMUSCULAR; INTRAVENOUS; SUBCUTANEOUS
Status: DISCONTINUED | OUTPATIENT
Start: 2018-02-21 | End: 2018-02-21 | Stop reason: HOSPADM

## 2018-02-21 RX ORDER — INSULIN LISPRO 100 [IU]/ML
INJECTION, SOLUTION INTRAVENOUS; SUBCUTANEOUS ONCE
Status: DISCONTINUED | OUTPATIENT
Start: 2018-02-21 | End: 2018-02-21 | Stop reason: HOSPADM

## 2018-02-21 RX ORDER — SODIUM CHLORIDE 0.9 % (FLUSH) 0.9 %
5-10 SYRINGE (ML) INJECTION AS NEEDED
Status: DISCONTINUED | OUTPATIENT
Start: 2018-02-21 | End: 2018-02-21 | Stop reason: HOSPADM

## 2018-02-21 RX ORDER — LIDOCAINE HYDROCHLORIDE 20 MG/ML
INJECTION, SOLUTION INFILTRATION; PERINEURAL AS NEEDED
Status: DISCONTINUED | OUTPATIENT
Start: 2018-02-21 | End: 2018-02-21 | Stop reason: HOSPADM

## 2018-02-21 RX ADMIN — Medication 10 ML: at 06:20

## 2018-02-21 RX ADMIN — SODIUM CHLORIDE 20 MG: 9 INJECTION INTRAMUSCULAR; INTRAVENOUS; SUBCUTANEOUS at 06:48

## 2018-02-21 RX ADMIN — SODIUM CHLORIDE, SODIUM LACTATE, POTASSIUM CHLORIDE, AND CALCIUM CHLORIDE 75 ML/HR: 600; 310; 30; 20 INJECTION, SOLUTION INTRAVENOUS at 06:21

## 2018-02-21 NOTE — PROGRESS NOTES
Pulmonary    Visit Vitals    /62    Pulse 68    Temp 97.4 °F (36.3 °C)    Resp 16    Ht 5' (1.524 m)    Wt 65.8 kg (145 lb)    SpO2 94%    Breastfeeding No    BMI 28.32 kg/m2       Patient examined post procedure  Awake, alert   No chest pains or hemoptysis  Vitals and oxygenation stable  Lungs symmetrical breath sounds, NO wheezing    Ok to dc home  Resume eliquis from tomorrow    D.w patient and son, daughter in law    Charles Mukherjee MD

## 2018-02-21 NOTE — PROCEDURES
[facility]  Pulmonary, Critical Care, and Sleep Medicine    Name: Dereck Suero MRN: [id number]   : 1932 Hospital: [address] [place] [address]   Date: 2018          Diagnostic Bronchoscopy, Bronchio-Alveolar Lavage (BAL)      Pre-procedure diagnosis  1. Interstitial Lung Disease    Post procedure diagnosis  Same    ASA: 2      Mallampatti: 2    Procedures  1. Diagnostic Bronchoscopy  2. Bronchio-Alveolar Lavage (BAL) from Right Middle lobe    Consent/Treatment: Informed consent was obtained from the  patient after risks, benefits and alternatives were explained. Timeout verified the correct patient and correct procedure. Anesthesia:   Moderate conscious sedation; total versed 1 mg iv. Topical 20% benzocaine for back of throat  Topical 2% lidocaine for vocal cords and airways  Fio2 via nc - 2-4 lits    Procedure Details: The patient was placed in semi-sitting position. Vital signs monitored throughout procedure and were stable. The flexible bronchoscope was passed through the oral adapter. The scope was passed through the vocal cords into the trachea. Airways surveillance:   -- The vocal cords were moving normally, no lesions seen. -- The trachea was normal, main radha was normal.   -- The right-sided endobronchial anatomy was completely inspected and were found to be normal.   -- The left-sided endobronchial anatomy was completely inspected and were found to be normal.   -- No bleeding or endobronchial masses or nodules or secretions seen. Specimens / Further Procedures:   Bronchio-Alveolar Lavage (BAL): The bronchoscope was wedged in the Right middle lobe segments and bronchoalveolar lavage was performed with 20 cc aliquots times 5; material was sent for cytology with pneumocystis staining, aerobic culture, cell count and diff, AFB smear and culture and fungal culture.     Complications: none  No bleeding noted post procedure    Estimated Blood Loss: None    PLAN  Frequent vitals next 30 mins  D.w patient and family (son) post procedure  Ok to [employer] home when stable    Dewayne Mckeon

## 2018-02-21 NOTE — DISCHARGE INSTRUCTIONS
After bronchoscopy, cough, fever and respiratory secretions are expected for 6 to 24 hours. Please use simple analgesics such as Tylenol if needed. If you are coughing jaci blood or having chest pain or shortness of breath - please call 911 and/or call the office [phone] / [phone]. It is recommended to take rest the day of the procedure after going home. Since sedation was used, you would feel sleepy. It is recommended not to drive or operate machinery or take alcohol on the day of the procedure. Light meal advised for the very first meal post-procedure. Normal meal can be taken thereafter. If the procedure was done with breathing tube and general anesthesia, some sore throat is expected for 24 to 48 hours. Since anesthesia / sedation was used, do not make important legal decisions today. RESUME ELIQUIS from tomorrow if not coughing blood, otherwise call office for advice. Albino Cook MD  [facility]  [phone] / [phone] from Nurse    PATIENT INSTRUCTIONS:    After general anesthesia or intravenous sedation, for 24 hours or while taking prescription Narcotics:  · Limit your activities  · Do not drive and operate hazardous machinery  · Do not make important personal or business decisions  · Do  not drink alcoholic beverages  · If you have not urinated within 8 hours after discharge, please contact your surgeon on call. Report the following to your surgeon:  · Excessive pain, swelling, redness or odor of or around the surgical area  · Temperature over 100.5  · Nausea and vomiting lasting longer than 4 hours or if unable to take medications  · Any signs of decreased circulation or nerve impairment to extremity: change in color, persistent  numbness, tingling, coldness or increase pain  · Any questions    *  Please give a list of your current medications to your Primary Care Provider.     *  Please update this list whenever your medications are discontinued, doses are      changed, or new medications (including over-the-counter products) are added. *  Please carry medication information at all times in case of emergency situations. These are general instructions for a healthy lifestyle:    No smoking/ No tobacco products/ Avoid exposure to second hand smoke  Surgeon General's Warning:  Quitting smoking now greatly reduces serious risk to your health. Obesity, smoking, and sedentary lifestyle greatly increases your risk for illness    A healthy diet, regular physical exercise & weight monitoring are important for maintaining a healthy lifestyle    You may be retaining fluid if you have a history of heart failure or if you experience any of the following symptoms:  Weight gain of 3 pounds or more overnight or 5 pounds in a week, increased swelling in our hands or feet or shortness of breath while lying flat in bed. Please call your doctor as soon as you notice any of these symptoms; do not wait until your next office visit. Recognize signs and symptoms of STROKE:    F-face looks uneven    A-arms unable to move or move unevenly    S-speech slurred or non-existent    T-time-call 911 as soon as signs and symptoms begin-DO NOT go       Back to bed or wait to see if you get better-TIME IS BRAIN. Warning Signs of HEART ATTACK     Call 911 if you have these symptoms:   Chest discomfort. Most heart attacks involve discomfort in the center of the chest that lasts more than a few minutes, or that goes away and comes back. It can feel like uncomfortable pressure, squeezing, fullness, or pain.  Discomfort in other areas of the upper body. Symptoms can include pain or discomfort in one or both arms, the back, neck, jaw, or stomach.  Shortness of breath with or without chest discomfort.  Other signs may include breaking out in a cold sweat, nausea, or lightheadedness. Don't wait more than five minutes to call 911 - MINUTES MATTER! Fast action can save your life. Calling 911 is almost always the fastest way to get lifesaving treatment. Emergency Medical Services staff can begin treatment when they arrive -- up to an hour sooner than if someone gets to the hospital by car. The discharge information has been reviewed with the patient. The patient verbalized understanding. Discharge medications reviewed with the patient and appropriate educational materials and side effects teaching were provided.   ___________________________________________________________________________________________________________________________________

## 2018-02-21 NOTE — IP AVS SNAPSHOT
[address] [facility] 
 
 
 [address] [facility] [address] [facility] Patient: Vijaya Sanabria MRN: [id number] EQA:7/20/3842 About your hospitalization You were admitted on:  February 21, 2018 You last received care in the:  [facility] PHASE 2 RECOVERY You were discharged on:  February 21, 2018 Why you were hospitalized Your primary diagnosis was:  Not on File Follow-up Information Follow up With Details Comments Contact Info Caty Gates MD   [address] [facility] [address] 
[phone] Glo Fagan MD Schedule an appointment as soon as possible for a visit in 1 week(s)  [address] [facility] 
[phone] Your Scheduled Appointments Monday March 12, 2018  2:15 PM EDT Follow Up with Caty Gates MD  
[facility] INTERNAL MEDICINE OF [facility]) [address] [facility] [address], [address] [facility] [address]  
[phone] Tuesday April 17, 2018 11:30 AM EDT  
CT CHEST WO CONT with HBV CT RM 1 HBV RAD CT ([facility]) [address] [facility] [address] [phone] GENERAL INSTRUCTIONS  This study does not require you to drink contrast prior to your study. RELATED STUDY INFORMATION  Bring any films, CDs, and reports related with you on the day of your exam.  This only includes studies done outside of [address] [place] [address], [facility], [place], and [place]. QUESTIONS  Notify the CT Department if you have any questions concerning your study. [place] - [phone] [facility] - [phone] CHECK IN INSTRUCTIONS:  Check in to the Registration desk 30 minutes prior to your appointment. For appointments after 5:00pm, after noon Saturday and all day Sunday  - check in to the Emergency Room Registration. [facility] at [address]  ([address], Main Entrance 1st floor) [place], [address] Discharge Orders None A check azael indicates which time of day the medication should be taken. My Medications CHANGE how you take these medications Instructions Each Dose to Equal  
 Morning Noon Evening Bedtime  
 allopurinol 100 mg tablet Commonly known as:  Ailyn Vázquez What changed:   
- how much to take 
- how to take this - when to take this 
- additional instructions Your last dose was: Your next dose is: TAKE 1/2 TABLET BY MOUTH DAILY CONTINUE taking these medications Instructions Each Dose to Equal  
 Morning Noon Evening Bedtime  
 apixaban 2.5 mg tablet Commonly known as:  Merly Taylor Your last dose was: Your next dose is: Take 1 Tab by mouth two (2) times a day. 2.5 mg CENTRUM SILVER Tab tablet Generic drug:  multivitamins-minerals-lutein Your last dose was: Your next dose is: Take 1 Tab by mouth daily. 1 Tab CITRACAL + D MAXIMUM PO Your last dose was: Your next dose is: Take 2 Tabs by mouth daily. 2 Tab  
    
   
   
   
  
 clobetasol 0.05 % topical foam  
Commonly known as:  OLUX Your last dose was: Your next dose is:    
   
   
 Apply  to affected area two (2) times daily as needed for Skin Irritation. use thin film on affected area  
     
   
   
   
  
 cyanocobalamin 1,000 mcg tablet Your last dose was: Your next dose is: Take 1,000 mcg by mouth daily. 1000 mcg  
    
   
   
   
  
 doxepin 25 mg capsule Commonly known as:  SINEquan Your last dose was: Your next dose is: Take 1 Cap by mouth nightly. 25 mg  
    
   
   
   
  
 folic acid 917 mcg tablet Your last dose was: Your next dose is: Take 800 mcg by mouth daily. 800 mcg  
    
   
   
   
  
 furosemide 20 mg tablet Commonly known as:  LASIX Your last dose was: Your next dose is:    
   
   
 1 tablet each AM  
     
   
   
   
  
 guanFACINE IR 1 mg IR tablet Commonly known as:  Vergil Barks Your last dose was: Your next dose is: TAKE ONE-HALF TABLET BY  MOUTH EVERY DAY  
     
   
   
   
  
 levothyroxine 100 mcg tablet Commonly known as:  SYNTHROID Your last dose was: Your next dose is: Take 1 Tab by mouth nightly. 100 mcg  
    
   
   
   
  
 loratadine 10 mg tablet Commonly known as:  Thermon Marker Your last dose was: Your next dose is: Take 10 mg by mouth daily. 10 mg  
    
   
   
   
  
 losartan 100 mg tablet Commonly known as:  COZAAR Your last dose was: Your next dose is: Take 1 Tab by mouth daily. 100 mg  
    
   
   
   
  
 metoprolol tartrate 50 mg tablet Commonly known as:  LOPRESSOR Your last dose was: Your next dose is: Take 1 Tab by mouth two (2) times a day. 50 mg OSTEO BI-FLEX PO Your last dose was: Your next dose is: Take 2 Tabs by mouth daily. 2 Tab  
    
   
   
   
  
 predniSONE 5 mg tablet Commonly known as:  Rochelle Ramirez Your last dose was: Your next dose is: Take 2 tablets (10 mg) by mouth once daily. Decrease to 1 1/2 tablets (7.5 mg) by mouth once daily on October 15, 2017. On 11/7/2017, dose will be further reduced to 1 tablet (5 mg) by mouth once daily as directed by Dr. Addison Mosquera. raNITIdine 300 mg tablet Commonly known as:  ZANTAC Your last dose was: Your next dose is: Take 1 Tab by mouth daily. 300 mg  
    
   
   
   
  
 simvastatin 20 mg tablet Commonly known as:  ZOCOR Your last dose was: Your next dose is: Take 1 Tab by mouth nightly. 20 mg  
    
   
   
   
  
 SYSTANE (PROPYLENE GLYCOL) 0.4-0.3 % Drop Generic drug:  peg 400-propylene glycol Your last dose was: Your next dose is:    
   
   
 Administer 1 Drop to both eyes as needed. 1 Drop  
    
   
   
   
  
 tamoxifen 20 mg tablet Commonly known as:  NOLVADEX Your last dose was: Your next dose is: Take 20 mg by mouth daily. 20 mg  
    
   
   
   
  
 triamcinolone acetonide 0.1 % topical cream  
Commonly known as:  KENALOG Your last dose was: Your next dose is:    
   
   
 Apply daily Discharge Instructions After bronchoscopy, cough, fever and respiratory secretions are expected for 6 to 24 hours. Please use simple analgesics such as Tylenol if needed. If you are coughing jaci blood or having chest pain or shortness of breath - please call 911 and/or call the office [phone] / [phone]. It is recommended to take rest the day of the procedure after going home. Since sedation was used, you would feel sleepy. It is recommended not to drive or operate machinery or take alcohol on the day of the procedure. Light meal advised for the very first meal post-procedure. Normal meal can be taken thereafter. If the procedure was done with breathing tube and general anesthesia, some sore throat is expected for 24 to 48 hours. Since anesthesia / sedation was used, do not make important legal decisions today. RESUME ELIQUIS from tomorrow if not coughing blood, otherwise call office for advice. MD Fernanda Marino [facility] [phone] DISCHARGE SUMMARY from Nurse PATIENT INSTRUCTIONS: 
 
 
F-face looks uneven A-arms unable to move or move unevenly S-speech slurred or non-existent T-time-call 911 as soon as signs and symptoms begin-DO NOT go  
 Back to bed or wait to see if you get better-TIME IS BRAIN. Warning Signs of HEART ATTACK Call 911 if you have these symptoms: 
? Chest discomfort. Most heart attacks involve discomfort in the center of the chest that lasts more than a few minutes, or that goes away and comes back. It can feel like uncomfortable pressure, squeezing, fullness, or pain. ? Discomfort in other areas of the upper body. Symptoms can include pain or discomfort in one or both arms, the back, neck, jaw, or stomach. ? Shortness of breath with or without chest discomfort. ? Other signs may include breaking out in a cold sweat, nausea, or lightheadedness. Don't wait more than five minutes to call [address]! Fast action can save your life. Calling 911 is almost always the fastest way to get lifesaving treatment. Emergency Medical Services staff can begin treatment when they arrive  up to an hour sooner than if someone gets to the hospital by car. The discharge information has been reviewed with the patient. The patient verbalized understanding. Discharge medications reviewed with the patient and appropriate educational materials and side effects teaching were provided. ___________________________________________________________________________________________________________________________________ ACO Transitions of Care Introducing [employer] [address] Fadumo Martel offers a voluntary care coordination program to provide high quality service and care to [facility] fee-for-service beneficiaries. Kranthi Warren was designed to help you enhance your health and well-being through the following services: ? Transitions of Care  support for individuals who are transitioning from one care setting to another (example: Hospital to home). ?  Chronic and Complex Care Coordination  support for individuals and caregivers of those with serious or chronic illnesses or with more than one chronic (ongoing) condition and those who take a number of different medications. If you meet specific medical criteria, a [facility] [address] may call you directly to coordinate your care with your primary care physician and your other care providers. For questions about the [facility] programs, please, contact your physicians office. For general questions or additional information about Accountable Care Organizations: 
Please visit www.medicare.gov/acos. html or call 1-800-MEDICARE (7-736.631.4171) TTY users should call 3-529.481.3778. Introducing [facility] & HEALTH SERVICES! [employer] introduces [employer] patient portal. Now you can access parts of your medical record, email your doctor's office, and request medication refills online. 1. In your internet browser, go to [email or web address] 2. Click on the First Time User? Click Here link in the Sign In box. You will see the New Member Sign Up page. 3. Enter your [employer] Access Code exactly as it appears below. You will not need to use this code after youve completed the sign-up process. If you do not sign up before the expiration date, you must request a new code. · [employer] Access Code: [email or web address] Expires: 5/22/2018  8:20 AM 
 
4. Enter the last four digits of your Social Security Number (xxxx) and Date of Birth (mm/dd/yyyy) as indicated and click Submit. You will be taken to the next sign-up page. 5. Create a Wing Power Energyt ID. This will be your [employer] login ID and cannot be changed, so think of one that is secure and easy to remember. 6. Create a [employer] Energyt password. You can change your password at any time. 7. Enter your Password Reset Question and Answer. This can be used at a later time if you forget your password. 8. Enter your e-mail address.  You will receive e-mail notification when new information is available in Iptivia. 9. Click Sign Up. You can now view and download portions of your medical record. 10. Click the Download Summary menu link to download a portable copy of your medical information. If you have questions, please visit the Frequently Asked Questions section of the Iptivia website. Remember, I[employer]ivia is NOT to be used for urgent needs. For medical emergencies, dial 911. Now available from your iPhone and Android! Providers Seen During Your Hospitalization Provider Specialty Primary office phone Lisa Dalton MD Pulmonary Disease [phone] Your Primary Care Physician (PCP) Primary Care Physician Office Phone Office Fax [address] [place] , [address] [place] [address] Road [facility] [phone] You are allergic to the following Allergen Reactions Adhesive Tape-Silicones Rash Codeine Hives Demerol (Meperidine) Nausea Only Levaquin (Levofloxacin) Other (comments) Swelling in feet and legs Recent Documentation Height Weight Breastfeeding? BMI OB Status Smoking Status 1.524 m 65.8 kg No 28.32 kg/m2 Hysterectomy Former Smoker Emergency Contacts Name Discharge Info Relation Home Work Mobile [facility] DISCHARGE CAREGIVER [3] Son [22] [phone] Patient Belongings The following personal items are in your possession at time of discharge: 
  Dental Appliances: None  Visual Aid: Glasses Please provide this summary of care documentation to your next provider. Signatures-by signing, you are acknowledging that this After Visit Summary has been reviewed with you and you have received a copy. Patient Signature:  ____________________________________________________________ Date:  ____________________________________________________________  
  
Marlyse Leaks  Provider Signature: ____________________________________________________________ Date:  ____________________________________________________________

## 2018-02-21 NOTE — PERIOP NOTES
I have reviewed discharge instructions with the patient and daughter. The patient and daughter verbalized understanding.     Patient armband removed and shredded

## 2018-02-21 NOTE — PERIOP NOTES
TRANSFER - OUT REPORT:    Verbal report given to April Duran RN(name) on Selene Owen  being transferred to Phase II(unit) for routine progression of care       Report consisted of patients Situation, Background, Assessment and   Recommendations(SBAR). Information from the following report(s) SBAR, Procedure Summary and MAR was reviewed with the receiving nurse. Lines:   Peripheral IV 02/21/18 Left Hand (Active)   Site Assessment Clean, dry, & intact 2/21/2018  6:41 AM   Phlebitis Assessment 0 2/21/2018  6:41 AM   Infiltration Assessment 0 2/21/2018  6:41 AM   Dressing Status Clean, dry, & intact 2/21/2018  6:41 AM   Dressing Type Tape;Transparent 2/21/2018  6:41 AM   Hub Color/Line Status Pink; Infusing;Flushed 2/21/2018  6:41 AM   Action Taken Blood drawn 2/21/2018  6:41 AM        Opportunity for questions and clarification was provided.       Patient transported with:   Registered Nurse

## 2018-02-23 LAB
BACTERIA SPEC CULT: NORMAL
GRAM STN SPEC: NORMAL
SERVICE CMNT-IMP: NORMAL

## 2018-03-05 ENCOUNTER — OFFICE VISIT (OUTPATIENT)
Dept: INTERNAL MEDICINE CLINIC | Age: 83
End: 2018-03-05

## 2018-03-05 VITALS
SYSTOLIC BLOOD PRESSURE: 178 MMHG | OXYGEN SATURATION: 99 % | HEART RATE: 73 BPM | RESPIRATION RATE: 16 BRPM | DIASTOLIC BLOOD PRESSURE: 88 MMHG | WEIGHT: 145 LBS | HEIGHT: 60 IN | BODY MASS INDEX: 28.47 KG/M2 | TEMPERATURE: 97.8 F

## 2018-03-05 DIAGNOSIS — R10.84 GENERALIZED ABDOMINAL PAIN: Primary | ICD-10-CM

## 2018-03-05 DIAGNOSIS — J84.10 PULMONARY FIBROSIS (HCC): ICD-10-CM

## 2018-03-05 DIAGNOSIS — I10 ESSENTIAL HYPERTENSION: ICD-10-CM

## 2018-03-05 RX ORDER — DICYCLOMINE HYDROCHLORIDE 10 MG/1
CAPSULE ORAL
Qty: 90 CAP | Refills: 2 | Status: SHIPPED | OUTPATIENT
Start: 2018-03-05 | End: 2018-05-30 | Stop reason: SDUPTHER

## 2018-03-05 NOTE — MR AVS SNAPSHOT
[address] [facility] 
 
 
 [address], [address] [place] [address] 
[phone] Patient: Larisa Uribe MRN: [id number] VZD:2/96/2796 Visit Information Date & Time Provider Department Dept. Phone Encounter #  
 3/5/2018 12:30 PM Preston Perez MD [facility] INTERNAL MEDICINE OF [place] [phone] [id number] Follow-up Instructions Return in about 10 days (around 3/15/2018). Your Appointments 3/15/2018 10:15 AM  
Follow Up with Preston Perez MD  
[address] [facility] Appt Note: 10 days [address], [address] [place] [facility] 56.  
  
   
 [address], [address] [address]  
  
    
 5/7/2018 10:40 AM  
Follow Up with Janeth Olaery MD  
[facility] ([facility]) Appt Note: 6 month follow up [facility] [address] [address] [address] [phone] [facility] [address] Upcoming Health Maintenance Date Due  
 GLAUCOMA SCREENING Q2Y 3/1/2018 MEDICARE YEARLY EXAM 9/26/2018 DTaP/Tdap/Td series (2 - Td) 10/20/2026 Allergies as of 3/5/2018  Review Complete On: 3/5/2018 By: Cory Vasquez LPN Severity Noted Reaction Type Reactions Adhesive Tape-silicones  89/75/2320    Rash Codeine  08/04/2014    Hives Demerol [Meperidine]  08/04/2014    Nausea Only Levaquin [Levofloxacin]  08/04/2014    Other (comments) Swelling in feet and legs Current Immunizations  Reviewed on 12/15/2017 Name Date Influenza High Dose Vaccine PF 9/25/2017, 10/13/2016  8:53 AM  
 Influenza Vaccine 10/6/2015, 9/1/2013 12:00 AM  
 Pneumococcal Conjugate (PCV-13) 10/6/2015 Pneumococcal Polysaccharide (PPSV-23) 10/14/2015, 4/9/2010, 4/11/2003, 9/9/1993 Td 7/30/2008, 9/9/1993 Tdap 10/20/2016  8:40 AM  
 Zoster Vaccine, Live 10/16/2017, 1/14/2009 Not reviewed this visit You Were Diagnosed With   
  
 Codes Comments Generalized abdominal pain    -  Primary ICD-10-CM: R10.84 ICD-9-CM: 789.07 Vitals BP Pulse Temp Resp Height(growth percentile) 178/88 (BP 1 Location: Left arm, BP Patient Position: Sitting) 73 97.8 °F (36.6 °C) (Tympanic) 16 5' (1.524 m) Weight(growth percentile) SpO2 BMI OB Status Smoking Status 145 lb (65.8 kg) 99% 28.32 kg/m2 Hysterectomy Former Smoker Vitals History BMI and BSA Data Body Mass Index Body Surface Area  
 28.32 kg/m 2 1.67 m 2 Preferred Pharmacy Pharmacy Name Phone [facility] [address] [place] [address] [phone] Your Updated Medication List  
  
   
This list is accurate as of 3/5/18  1:56 PM.  Always use your most recent med list.  
  
  
  
  
 allopurinol 100 mg tablet Commonly known as:  ZYLOPRIM  
TAKE 1/2 TABLET BY MOUTH DAILY  
  
 apixaban 2.5 mg tablet Commonly known as:  Benny Locker Take 1 Tab by mouth two (2) times a day. CENTRUM SILVER Tab tablet Generic drug:  multivitamins-minerals-lutein Take 1 Tab by mouth daily. CITRACAL + D MAXIMUM PO Take 2 Tabs by mouth daily. clobetasol 0.05 % topical foam  
Commonly known as:  OLUX Apply  to affected area two (2) times daily as needed for Skin Irritation. use thin film on affected area  
  
 cyanocobalamin 1,000 mcg tablet Take 1,000 mcg by mouth daily. dicyclomine 10 mg capsule Commonly known as:  BENTYL 1 three times per day (with each meal) doxepin 25 mg capsule Commonly known as:  SINEquan Take 1 Cap by mouth nightly. folic acid 341 mcg tablet Take 800 mcg by mouth daily. furosemide 20 mg tablet Commonly known as:  LASIX  
1 tablet each AM  
  
 guanFACINE IR 1 mg IR tablet Commonly known as:  TENEX  
TAKE ONE-HALF TABLET BY  MOUTH EVERY DAY  
  
 levothyroxine 100 mcg tablet Commonly known as:  SYNTHROID Take 1 Tab by mouth nightly. loratadine 10 mg tablet Commonly known as:  Padmini Mary Take 10 mg by mouth daily. losartan 100 mg tablet Commonly known as:  COZAAR Take 1 Tab by mouth daily. metoprolol tartrate 50 mg tablet Commonly known as:  LOPRESSOR Take 1 Tab by mouth two (2) times a day. OSTEO BI-FLEX PO Take 2 Tabs by mouth daily. predniSONE 5 mg tablet Commonly known as:  Paulette Lies Take 2 tablets (10 mg) by mouth once daily. Decrease to 1 1/2 tablets (7.5 mg) by mouth once daily on October 15, 2017. On 2017, dose will be further reduced to 1 tablet (5 mg) by mouth once daily as directed by Dr. Josi Malin. raNITIdine 300 mg tablet Commonly known as:  ZANTAC Take 1 Tab by mouth daily. simvastatin 20 mg tablet Commonly known as:  ZOCOR Take 1 Tab by mouth nightly. SYSTANE (PROPYLENE GLYCOL) 0.4-0.3 % Drop Generic drug:  peg 400-propylene glycol Administer 1 Drop to both eyes as needed. tamoxifen 20 mg tablet Commonly known as:  NOLVADEX Take 20 mg by mouth daily. triamcinolone acetonide 0.1 % topical cream  
Commonly known as:  KENALOG Apply daily Prescriptions Sent to Pharmacy Refills  
 dicyclomine (BENTYL) 10 mg capsule 2 Si three times per day (with each meal) Class: Normal  
 Pharmacy: [facility] [address] [place] [address] Ph #: [phone] Follow-up Instructions Return in about 10 days (around 3/15/2018). To-Do List   
 2018 Imaging:  US ABD LTD   
  
 2018 10:45 AM  
  Appointment with SAHARA RODAS RM 2 at [address] [place] [address] ([phone]) OUTSIDE FILMS  - Any outside films related to the study being scheduled should be brought with you on the day of the exam.  If this cannot be done there may be a delay in the reading of the study. NPO -Patient must be NPO (no food or drink) 8 hours prior to the exam.  MEDICATIONS  - Patient must bring a complete list of all medications currently taking to include prescriptions, over-the-counter meds, herbals, vitamins & any dietary supplements 04/17/2018 11:30 AM  
  Appointment with HBV CT RM 1 at [facility] CT ([phone]) GENERAL INSTRUCTIONS  This study does not require you to drink contrast prior to your study. RELATED STUDY INFORMATION  Bring any films, CDs, and reports related with you on the day of your exam.  This only includes studies done outside of [address] [place] [address], [facility], [place], and [facility]. QUESTIONS  Notify the CT Department if you have any questions concerning your study. [place] - [phone] [facility] - [phone] Introducing [facility] & HEALTH SERVICES! [facility] introduces [employer] patient portal. Now you can access parts of your medical record, email your doctor's office, and request medication refills online. 1. In your internet browser, go to [email or web address] 2. Click on the First Time User? Click Here link in the Sign In box. You will see the New Member Sign Up page. 3. Enter your [place] Access Code exactly as it appears below. You will not need to use this code after youve completed the sign-up process. If you do not sign up before the expiration date, you must request a new code. · [employer] Access Code: [email or web address] Expires: 5/22/2018  8:20 AM 
 
4. Enter the last four digits of your Social Security Number (xxxx) and Date of Birth (mm/dd/yyyy) as indicated and click Submit. You will be taken to the next sign-up page. 5. Create a GOWEXt ID. This will be your GOWEXt login ID and cannot be changed, so think of one that is secure and easy to remember. 6. Create a GOWEXt password. You can change your password at any time. 7. Enter your Password Reset Question and Answer. This can be used at a later time if you forget your password. 8. Enter your e-mail address. You will receive e-mail notification when new information is available in [address] 9. Click Sign Up. You can now view and download portions of your medical record. 10. Click the Download Summary menu link to download a portable copy of your medical information. If you have questions, please visit the Frequently Asked Questions section of the Red Balloon [employer] website. Remember, [employer] is NOT to be used for urgent needs. For medical emergencies, dial 911. Now available from your iPhone and Android! Please provide this summary of care documentation to your next provider. Your primary care clinician is listed as Yoni Pastrana. If you have any questions after today's visit, please call [phone].

## 2018-03-06 NOTE — PROGRESS NOTES
The patient presents to the office today with the chief complaint of abdominal pain    HPI    The patient complains of several weeks of generalized abdominal pain. The patient finds that the pain develops after eating. The patient notes mild abdominal bloating. The patient has occasional vomiting and loose bowels associated with the pain. The patient is status post a cholecystectomy 19 years ago. The patient remains on medications for hypertension. She is tolerating the medications well. The patient has pulmonary fibrosis. The patient has dyspnea on exertion which is stable. Review of Systems   Respiratory: Positive for shortness of breath. Cardiovascular: Negative for chest pain and leg swelling. Gastrointestinal: Positive for abdominal pain. Allergies   Allergen Reactions    Adhesive Tape-Silicones Rash    Codeine Hives    Demerol [Meperidine] Nausea Only    Levaquin [Levofloxacin] Other (comments)     Swelling in feet and legs       Current Outpatient Prescriptions   Medication Sig Dispense Refill    dicyclomine (BENTYL) 10 mg capsule 1 three times per day (with each meal) 90 Cap 2    guanFACINE IR (TENEX) 1 mg IR tablet TAKE ONE-HALF TABLET BY  MOUTH EVERY DAY 45 Tab 3    triamcinolone acetonide (KENALOG) 0.1 % topical cream Apply daily 45 g 1    predniSONE (DELTASONE) 5 mg tablet Take 2 tablets (10 mg) by mouth once daily. Decrease to 1 1/2 tablets (7.5 mg) by mouth once daily on October 15, 2017. On 11/7/2017, dose will be further reduced to 1 tablet (5 mg) by mouth once daily as directed by Dr. Cindy Shoemaker.  CALCIUM CITRATE/VITAMIN D3 (CITRACAL + D MAXIMUM PO) Take 2 Tabs by mouth daily.  folic acid 011 mcg tablet Take 800 mcg by mouth daily.  losartan (COZAAR) 100 mg tablet Take 1 Tab by mouth daily. 90 Tab 3    apixaban (ELIQUIS) 2.5 mg tablet Take 1 Tab by mouth two (2) times a day. 60 Tab 0    simvastatin (ZOCOR) 20 mg tablet Take 1 Tab by mouth nightly.  80 Tab 3    raNITIdine (ZANTAC) 300 mg tablet Take 1 Tab by mouth daily. 90 Tab 3    doxepin (SINEQUAN) 25 mg capsule Take 1 Cap by mouth nightly. 90 Cap 3    metoprolol tartrate (LOPRESSOR) 50 mg tablet Take 1 Tab by mouth two (2) times a day. 180 Tab 3    levothyroxine (SYNTHROID) 100 mcg tablet Take 1 Tab by mouth nightly. 90 Tab 3    furosemide (LASIX) 20 mg tablet 1 tablet each AM 90 Tab 3    peg 400-propylene glycol (SYSTANE, PROPYLENE GLYCOL,) 0.4-0.3 % drop Administer 1 Drop to both eyes as needed.  GLUCOSAMINE HCL/CHONDR LEGER A NA (OSTEO BI-FLEX PO) Take 2 Tabs by mouth daily.  cyanocobalamin 1,000 mcg tablet Take 1,000 mcg by mouth daily.  multivitamins-minerals-lutein (CENTRUM SILVER) tab tablet Take 1 Tab by mouth daily.  loratadine (CLARITIN) 10 mg tablet Take 10 mg by mouth daily.  tamoxifen (NOLVADEX) 20 mg tablet Take 20 mg by mouth daily.  clobetasol (OLUX) 0.05 % topical foam Apply  to affected area two (2) times daily as needed for Skin Irritation.  use thin film on affected area      allopurinol (ZYLOPRIM) 100 mg tablet TAKE 1/2 TABLET BY MOUTH DAILY (Patient taking differently: Take 100 mg by mouth daily.) 45 Tab 3       Past Medical History:   Diagnosis Date    Autoimmune disease ([address]     Breast cancer ([address] 2001, 1/ 2014    right, left    Cancer ([address]     Colon polyps     Elevated cholesterol     Enlarged lymph nodes     pericardium    GERD (gastroesophageal reflux disease)     Hypertension     Ill-defined condition 2007 to 2010    lung-ground glass followed by Dr. Anuradha Beebe of breast, right 1976    benign    Mass of breast, right 1994    benign    Pure hyperglyceridemia     Thromboembolus ([address] 09/2017    blood clot right leg    Thyroid disease        Past Surgical History:   Procedure Laterality Date    HX APPENDECTOMY      HX CATARACT REMOVAL Bilateral 2010    HX CHOLECYSTECTOMY      HX HEMORRHOIDECTOMY      HX HERNIA REPAIR  2004    small abdominal repair and fistula    HX HYSTERECTOMY  1991    cystocele    HX LYMPHADENECTOMY Right 7/2001    right axilla    HX MASTECTOMY Right 2001    with reconstruction    HX MASTECTOMY Left 1/14    HX POLYPECTOMY      [id number] muliple removals    HX RECTOCELE REPAIR  2003       Social History     Social History    Marital status:      Spouse name: N/A    Number of children: N/A    Years of education: N/A     Occupational History    Not on file. Social History Main Topics    Smoking status: Former Smoker     Packs/day: 2.00     Years: 9.00     Quit date: 1/1/1963    Smokeless tobacco: Never Used    Alcohol use 0.5 oz/week     1 Standard drinks or equivalent per week      Comment: rare    Drug use: No    Sexual activity: Not Currently     Other Topics Concern    Not on file     Social History Narrative       Patient does have an advanced directive on file    Visit Vitals    /88 (BP 1 Location: Left arm, BP Patient Position: Sitting)    Pulse 73    Temp 97.8 °F (36.6 °C) (Tympanic)    Resp 16    Ht 5' (1.524 m)    Wt 145 lb (65.8 kg)    SpO2 99%    BMI 28.32 kg/m2       Physical Exam  No Cervical Lymphadenopathy  No Supraclavicular Lymphadenopathy  Thyroid is Normal  Lungs are normal to percussion. Clear to auscultation   Heart:  S1 S2 are normal, No gallops, No mummers  No Carotid Bruits  Abdomen:  Normal Bowel Sounds. Mild generalized tenderness. No rebound. No masses. No Hepatomegaly or Splenomegly  LE:  Strong Pedal Pulses.   No Edema      Admission on 02/21/2018, Discharged on 02/21/2018   Component Date Value Ref Range Status    WBC 02/21/2018 7.2  4.6 - 13.2 K/uL Final    RBC 02/21/2018 3.35* 4.20 - 5.30 M/uL Final    HGB 02/21/2018 10.6* 12.0 - 16.0 g/dL Final    HCT 02/21/2018 32.3* 35.0 - 45.0 % Final    MCV 02/21/2018 96.4  74.0 - 97.0 FL Final    MCH 02/21/2018 31.6  24.0 - 34.0 PG Final    MCHC 2018 32.8  31.0 - 37.0 g/dL Final    RDW 2018 13.7  11.6 - 14.5 % Final    PLATELET  967  135 - 420 K/uL Final    MPV 2018 9.4  9.2 - 11.8 FL Final    Prothrombin time 2018 13.4  11.5 - 15.2 sec Final    INR 2018 1.1  0.8 - 1.2   Final    Comment:            INR Therapeutic Ranges         (on stable oral anticoagulant):     INDICATION                INR  DVT/PE/Atrial Fib          2.0-3.0  MI/Mechanical Heart Valve  2.5-3.5      aPTT 2018 24.1  23.0 - 36.4 SEC Final    Special Requests: 2018 NO SPECIAL REQUESTS    Preliminary    FUNGUS SMEAR 2018 NO FUNGAL ELEMENTS SEEN    Preliminary    Culture result: 2018 NO FUNGUS ISOLATED 12 DAYS    Preliminary    Special Requests: 2018 NO SPECIAL REQUESTS    Final    GRAM STAIN 2018 FEW WBC'S    Final    GRAM STAIN 2018 RARE GRAM POSITIVE COCCI IN PAIRS    Final    GRAM STAIN 2018 RARE GRAM POSITIVE RODS    Final    GRAM STAIN 2018 RARE GRAM NEGATIVE RODS    Final    Culture result: 2018 MANY NORMAL RESPIRATORY SOWMYA    Final    Source 2018 BRONCHIAL LAVAGE    Final    AFB Specimen processing 2018 Concentration   Final    Acid Fast Smear 2018 NEGATIVE     Final    Comment: (NOTE)  Performed At: [address] [id number]  Elpidio Gutierrez MD Q      Acid Fast Culture 2018 PENDING   Incomplete    [facility] LAVAGE DIFF 2018        Final    [facility] NEUTROPHIL 2018 32  % Final    [facility] MACROPHAGES 2018 22  % Final    [facility] LYMPHS 2018 34  % Final    Hermelindo Azalia 2018 12  % Final   Hospital Outpatient Visit on 2017   Component Date Value Ref Range Status    Sodium 2017 139  136 - 145 mmol/L Final    Potassium 2017 3.9  3.5 - 5.5 mmol/L Final    Chloride 2017 101  100 - 108 mmol/L Final    CO2 2017 29  21 - 32 mmol/L Final    Anion gap 12/18/2017 9  3.0 - 18 mmol/L Final    Glucose 12/18/2017 176* 74 - 99 mg/dL Final    BUN 12/18/2017 44* 7.0 - 18 MG/DL Final    Creatinine 12/18/2017 1.67* 0.6 - 1.3 MG/DL Final    BUN/Creatinine ratio 12/18/2017 26* 12 - 20   Final    GFR est AA 12/18/2017 35* >60 ml/min/1.73m2 Final    GFR est non-AA 12/18/2017 29* >60 ml/min/1.73m2 Final    Comment: (NOTE)  Estimated GFR is calculated using the Modification of Diet in Renal   Disease (MDRD) Study equation, reported for both  Americans   (GFRAA) and non- Americans (GFRNA), and normalized to 1.73m2   body surface area. The physician must decide which value applies to   the patient. The MDRD study equation should only be used in   individuals age 25 or older. It has not been validated for the   following: pregnant women, patients with serious comorbid conditions,   or on certain medications, or persons with extremes of body size,   muscle mass, or nutritional status.  Calcium 12/18/2017 9.4  8.5 - 10.1 MG/DL Final       .No results found for any visits on 03/05/18. Assessment / Plan      ICD-10-CM ICD-9-CM    1. Generalized abdominal pain R10.84 789.07 US ABD LTD   2. Essential hypertension I10 401.9    3. Pulmonary fibrosis ([address] J84.10 515        Ultrasound right upper quadrant abdomen  Bentyl  she was advised to continue her maintenance medications      Follow-up Disposition:  Return in about 10 days (around 3/15/2018). I asked Elyssa Larsen if she has any questions and I answered the questions. Francine Ortiz  Kristina Larsen states that she understands the treatment plan and agrees with the treatment plan

## 2018-03-08 ENCOUNTER — HOSPITAL ENCOUNTER (OUTPATIENT)
Dept: ULTRASOUND IMAGING | Age: 83
Discharge: HOME | End: 2018-03-08
Attending: INTERNAL MEDICINE
Payer: MEDICARE

## 2018-03-08 DIAGNOSIS — R10.84 GENERALIZED ABDOMINAL PAIN: ICD-10-CM

## 2018-03-08 PROCEDURE — 76705 ECHO EXAM OF ABDOMEN: CPT

## 2018-03-12 ENCOUNTER — TELEPHONE (OUTPATIENT)
Dept: INTERNAL MEDICINE CLINIC | Age: 83
End: 2018-03-12

## 2018-03-15 ENCOUNTER — OFFICE VISIT (OUTPATIENT)
Dept: INTERNAL MEDICINE CLINIC | Age: 83
End: 2018-03-15

## 2018-03-15 ENCOUNTER — HOSPITAL ENCOUNTER (OUTPATIENT)
Dept: LAB | Age: 83
Discharge: HOME | End: 2018-03-15
Payer: MEDICARE

## 2018-03-15 VITALS
WEIGHT: 147 LBS | RESPIRATION RATE: 16 BRPM | BODY MASS INDEX: 28.86 KG/M2 | SYSTOLIC BLOOD PRESSURE: 132 MMHG | DIASTOLIC BLOOD PRESSURE: 76 MMHG | OXYGEN SATURATION: 97 % | HEIGHT: 60 IN | TEMPERATURE: 97.6 F | HEART RATE: 67 BPM

## 2018-03-15 DIAGNOSIS — R10.84 GENERALIZED ABDOMINAL PAIN: Primary | ICD-10-CM

## 2018-03-15 DIAGNOSIS — J84.10 PULMONARY FIBROSIS (HCC): ICD-10-CM

## 2018-03-15 DIAGNOSIS — R10.84 GENERALIZED ABDOMINAL PAIN: ICD-10-CM

## 2018-03-15 DIAGNOSIS — I10 ESSENTIAL HYPERTENSION: ICD-10-CM

## 2018-03-15 LAB
ALBUMIN SERPL-MCNC: 3.5 G/DL (ref 3.4–5)
ALBUMIN/GLOB SERPL: 1 {RATIO} (ref 0.8–1.7)
ALP SERPL-CCNC: 84 U/L (ref 45–117)
ALT SERPL-CCNC: 124 U/L (ref 13–56)
ANION GAP SERPL CALC-SCNC: 6 MMOL/L (ref 3–18)
AST SERPL-CCNC: 63 U/L (ref 15–37)
BILIRUB SERPL-MCNC: 0.4 MG/DL (ref 0.2–1)
BUN SERPL-MCNC: 32 MG/DL (ref 7–18)
BUN/CREAT SERPL: 21 (ref 12–20)
CALCIUM SERPL-MCNC: 8.9 MG/DL (ref 8.5–10.1)
CHLORIDE SERPL-SCNC: 103 MMOL/L (ref 100–108)
CO2 SERPL-SCNC: 30 MMOL/L (ref 21–32)
CREAT SERPL-MCNC: 1.56 MG/DL (ref 0.6–1.3)
GLOBULIN SER CALC-MCNC: 3.5 G/DL (ref 2–4)
GLUCOSE SERPL-MCNC: 106 MG/DL (ref 74–99)
POTASSIUM SERPL-SCNC: 4 MMOL/L (ref 3.5–5.5)
PROT SERPL-MCNC: 7 G/DL (ref 6.4–8.2)
SODIUM SERPL-SCNC: 139 MMOL/L (ref 136–145)

## 2018-03-15 PROCEDURE — 80053 COMPREHEN METABOLIC PANEL: CPT | Performed by: INTERNAL MEDICINE

## 2018-03-15 NOTE — MR AVS SNAPSHOT
[address] [facility] 
 
 
 [address] [place], [address] [facility] [address] 
[phone] Patient: Srikanth Lopez MRN: [id number] OFS:[birth year]35 Visit Information Date & Time Provider Department Dept. Phone Encounter #  
 3/15/2018 10:15 AM Joshua Alexis MD [facility] [place] [phone] [id number] Your Appointments 3/28/2018  9:00 AM  
Follow Up with Joshua Alexis MD  
[address] [place] [address] [address]) Appt Note: f/u testing [address] [place], [address] [facility]  
  
   
 [address] [facility] [address]  
  
    
 5/7/2018 10:40 AM  
Follow Up with Kelsy Loomis MD  
[facility] ([address]) Appt Note: 6 month follow up [facility] [address] [address] [address] [phone] [address] [place] [address] [address] Upcoming Health Maintenance Date Due  
 GLAUCOMA SCREENING Q2Y 3/1/2018 MEDICARE YEARLY EXAM 9/26/2018 DTaP/Tdap/Td series (2 - Td) 10/20/2026 Allergies as of 3/15/2018  Review Complete On: 3/15/2018 By: Burnard Height, LPN Severity Noted Reaction Type Reactions Adhesive Tape-silicones  99/86/2916    Rash Codeine  08/04/2014    Hives Demerol [Meperidine]  08/04/2014    Nausea Only Levaquin [Levofloxacin]  08/04/2014    Other (comments) Swelling in feet and legs Current Immunizations  Reviewed on 3/12/2018 Name Date Influenza High Dose Vaccine PF 9/25/2017, 10/13/2016  8:53 AM  
 Influenza Vaccine 10/6/2015, 9/1/2013 12:00 AM  
 Pneumococcal Conjugate (PCV-13) 10/6/2015 Pneumococcal Polysaccharide (PPSV-23) 10/14/2015, 4/9/2010, 4/11/2003, 9/9/1993 Td 7/30/2008, 9/9/1993 Tdap 10/20/2016  8:40 AM  
 Zoster Vaccine, Live 10/16/2017, 1/14/2009 Not reviewed this visit You Were Diagnosed With   
  
 Codes Comments Generalized abdominal pain    -  Primary ICD-10-CM: R10.84 ICD-9-CM: 789.07 Vitals BP Pulse Temp Resp Height(growth percentile) 132/76 (BP 1 Location: Left arm, BP Patient Position: Sitting) 67 97.6 °F (36.4 °C) (Tympanic) 16 5' (1.524 m) Weight(growth percentile) SpO2 BMI OB Status Smoking Status 147 lb (66.7 kg) 97% 28.71 kg/m2 Hysterectomy Former Smoker Vitals History BMI and BSA Data Body Mass Index Body Surface Area 28.71 kg/m 2 1.68 m 2 Preferred Pharmacy Pharmacy Name Phone [facility] [address] [place] [phone] Your Updated Medication List  
  
   
This list is accurate as of 3/15/18 12:00 PM.  Always use your most recent med list.  
  
  
  
  
 allopurinol 100 mg tablet Commonly known as:  ZYLOPRIM  
TAKE 1/2 TABLET BY MOUTH DAILY  
  
 apixaban 2.5 mg tablet Commonly known as:  Tosha Ewiiaapaayp Take 1 Tab by mouth two (2) times a day. CENTRUM SILVER Tab tablet Generic drug:  multivitamins-minerals-lutein Take 1 Tab by mouth daily. CITRACAL + D MAXIMUM PO Take 2 Tabs by mouth daily. clobetasol 0.05 % topical foam  
Commonly known as:  OLUX Apply  to affected area two (2) times daily as needed for Skin Irritation. use thin film on affected area  
  
 cyanocobalamin 1,000 mcg tablet Take 1,000 mcg by mouth daily. dicyclomine 10 mg capsule Commonly known as:  BENTYL 1 three times per day (with each meal) doxepin 25 mg capsule Commonly known as:  SINEquan Take 1 Cap by mouth nightly. folic acid 960 mcg tablet Take 800 mcg by mouth daily. furosemide 20 mg tablet Commonly known as:  LASIX  
1 tablet each AM  
  
 guanFACINE IR 1 mg IR tablet Commonly known as:  TENEX  
TAKE ONE-HALF TABLET BY  MOUTH EVERY DAY  
  
 levothyroxine 100 mcg tablet Commonly known as:  SYNTHROID Take 1 Tab by mouth nightly. loratadine 10 mg tablet Commonly known as:  Tiajuana Bile Take 10 mg by mouth daily. losartan 100 mg tablet Commonly known as:  COZAAR Take 1 Tab by mouth daily. metoprolol tartrate 50 mg tablet Commonly known as:  LOPRESSOR Take 1 Tab by mouth two (2) times a day. OSTEO BI-FLEX PO Take 2 Tabs by mouth daily. predniSONE 5 mg tablet Commonly known as:  Latoya Blew Take 2 tablets (10 mg) by mouth once daily. Decrease to 1 1/2 tablets (7.5 mg) by mouth once daily on October 15, 2017. On 11/7/2017, dose will be further reduced to 1 tablet (5 mg) by mouth once daily as directed by Dr. Shayy Crowe. raNITIdine 300 mg tablet Commonly known as:  ZANTAC Take 1 Tab by mouth daily. simvastatin 20 mg tablet Commonly known as:  ZOCOR Take 1 Tab by mouth nightly. SYSTANE (PROPYLENE GLYCOL) 0.4-0.3 % Drop Generic drug:  peg 400-propylene glycol Administer 1 Drop to both eyes as needed. tamoxifen 20 mg tablet Commonly known as:  NOLVADEX Take 20 mg by mouth daily. triamcinolone acetonide 0.1 % topical cream  
Commonly known as:  KENALOG Apply daily To-Do List   
 03/15/2018 Lab:  METABOLIC PANEL, COMPREHENSIVE   
  
 03/16/2018 Imaging:  DUPLEX ABD VISC ART ORGANS COMPLETE   
  
 03/26/2018 9:00 AM  
  Appointment with [facility] VASCULAR LAB 1 at [facility] VASCULAR LAB ([phone]) DAY PRIOR TO EXAM: 1. You can eat anything that you like the day prior to the exam as long as it does not give you gas. Please refrain from carbonated beverages after noon the day before the exam.  DAY OF THE EXAM: 1. DO NOT EAT OR DRINK ANYTHING!! This includes medications. Please bring your medications with you and you can take them after the examination. If you are diabetic, then bring a breakfast meal.  You will be able to eat it following the examination.   Patient may hand-carry an order or the physician can fax a written prescription to Jenny Scheduling @ [phone]. Please report to the main location @ [place] [id number]  Kamari Curry at least 15 minutes prior to your appointment time. The  is located on the RIGHT side of the street, immediately adjacent to the Emergency Room. 04/17/2018 11:30 AM  
  Appointment with HBV CT RM 1 at [facility] RAD CT ([phone]) GENERAL INSTRUCTIONS  This study does not require you to drink contrast prior to your study. RELATED STUDY INFORMATION  Bring any films, CDs, and reports related with you on the day of your exam.  This only includes studies done outside of [address] [place] [address], [facility], [facility], and [facility]. QUESTIONS  Notify the CT Department if you have any questions concerning your study. [facility] - [phone] [facility] - [phone] Introducing [facility]! Trish Hernandes introduces [employer] patient portal. Now you can access parts of your medical record, email your doctor's office, and request medication refills online. 1. In your internet browser, go to [email or web address] 2. Click on the First Time User? Click Here link in the Sign In box. You will see the New Member Sign Up page. 3. Enter your [employer] Access Code exactly as it appears below. You will not need to use this code after youve completed the sign-up process. If you do not sign up before the expiration date, you must request a new code. · [employer] Access Code: [email or web address] Expires: 5/22/2018  9:20 AM 
 
4. Enter the last four digits of your Social Security Number (xxxx) and Date of Birth (mm/dd/yyyy) as indicated and click Submit. You will be taken to the next sign-up page. 5. Create a [employer] ID. This will be your [employer] login ID and cannot be changed, so think of one that is secure and easy to remember. 6. Create a [employer] password. You can change your password at any time. 7. Enter your Password Reset Question and Answer. This can be used at a later time if you forget your password. 8. Enter your e-mail address. You will receive e-mail notification when new information is available in [address] 9. Click Sign Up. You can now view and download portions of your medical record. 10. Click the Download Summary menu link to download a portable copy of your medical information. If you have questions, please visit the Frequently Asked Questions section of the [employer] website. Remember, [employer] is NOT to be used for urgent needs. For medical emergencies, dial 911. Now available from your iPhone and Android! Please provide this summary of care documentation to your next provider. Your primary care clinician is listed as Rosa Oshea. If you have any questions after today's visit, please call [phone].

## 2018-03-19 NOTE — PROGRESS NOTES
The patient presents to the office today with the chief complaint of abdominal pain    HPI    The patient has intermittent abdominal pain. This has greatly improved with Bentyl. The patient underwent a ultrasound of the abdomen that was negative except for a possible vascular lesion. The patient has pulmonary fibrosis. She has mild dyspnea but this is stable. The patient remains on medications for hypertension. Review of Systems   Respiratory: Positive for shortness of breath. Cardiovascular: Negative for chest pain and leg swelling. Gastrointestinal: Positive for abdominal pain. Allergies   Allergen Reactions    Adhesive Tape-Silicones Rash    Codeine Hives    Demerol [Meperidine] Nausea Only    Levaquin [Levofloxacin] Other (comments)     Swelling in feet and legs       Current Outpatient Prescriptions   Medication Sig Dispense Refill    dicyclomine (BENTYL) 10 mg capsule 1 three times per day (with each meal) 90 Cap 2    guanFACINE IR (TENEX) 1 mg IR tablet TAKE ONE-HALF TABLET BY  MOUTH EVERY DAY 45 Tab 3    triamcinolone acetonide (KENALOG) 0.1 % topical cream Apply daily 45 g 1    predniSONE (DELTASONE) 5 mg tablet Take 2 tablets (10 mg) by mouth once daily. Decrease to 1 1/2 tablets (7.5 mg) by mouth once daily on October 15, 2017. On 11/7/2017, dose will be further reduced to 1 tablet (5 mg) by mouth once daily as directed by Dr. Addison Mosquera.  CALCIUM CITRATE/VITAMIN D3 (CITRACAL + D MAXIMUM PO) Take 2 Tabs by mouth daily.  folic acid 433 mcg tablet Take 800 mcg by mouth daily.  losartan (COZAAR) 100 mg tablet Take 1 Tab by mouth daily. 90 Tab 3    apixaban (ELIQUIS) 2.5 mg tablet Take 1 Tab by mouth two (2) times a day. 60 Tab 0    simvastatin (ZOCOR) 20 mg tablet Take 1 Tab by mouth nightly. 90 Tab 3    raNITIdine (ZANTAC) 300 mg tablet Take 1 Tab by mouth daily. 90 Tab 3    doxepin (SINEQUAN) 25 mg capsule Take 1 Cap by mouth nightly.  90 Cap 3    metoprolol tartrate (LOPRESSOR) 50 mg tablet Take 1 Tab by mouth two (2) times a day. 180 Tab 3    levothyroxine (SYNTHROID) 100 mcg tablet Take 1 Tab by mouth nightly. 90 Tab 3    furosemide (LASIX) 20 mg tablet 1 tablet each AM (Patient taking differently: Take 30 mg by mouth daily. 1 tablet each AM) 90 Tab 3    peg 400-propylene glycol (SYSTANE, PROPYLENE GLYCOL,) 0.4-0.3 % drop Administer 1 Drop to both eyes as needed.  GLUCOSAMINE HCL/CHONDR LEGER A NA (OSTEO BI-FLEX PO) Take 2 Tabs by mouth daily.  cyanocobalamin 1,000 mcg tablet Take 1,000 mcg by mouth daily.  multivitamins-minerals-lutein (CENTRUM SILVER) tab tablet Take 1 Tab by mouth daily.  loratadine (CLARITIN) 10 mg tablet Take 10 mg by mouth daily.  tamoxifen (NOLVADEX) 20 mg tablet Take 20 mg by mouth daily.  clobetasol (OLUX) 0.05 % topical foam Apply  to affected area two (2) times daily as needed for Skin Irritation.  use thin film on affected area         Past Medical History:   Diagnosis Date    Autoimmune disease ([address]     Breast cancer ([address] 2001, 1/ 2014    right, left    Cancer ([address]     Colon polyps     Elevated cholesterol     Enlarged lymph nodes     pericardium    GERD (gastroesophageal reflux disease)     Hypertension     Ill-defined condition 2007 to 2010    lung-ground glass followed by Dr. Edmond Claudio of breast, right 1976    benign    Mass of breast, right 1994    benign    Pure hyperglyceridemia     Thromboembolus ([address] 09/2017    blood clot right leg    Thyroid disease        Past Surgical History:   Procedure Laterality Date    HX APPENDECTOMY      HX CATARACT REMOVAL Bilateral 2010    HX CHOLECYSTECTOMY      HX HEMORRHOIDECTOMY      HX HERNIA REPAIR  2004    small abdominal repair and fistula    HX HYSTERECTOMY  1991    cystocele    HX LYMPHADENECTOMY Right 7/2001    right axilla    HX MASTECTOMY Right 2001    with reconstruction    HX MASTECTOMY Left 1/14    HX POLYPECTOMY      [id number] muliple removals    HX RECTOCELE REPAIR  2003       Social History     Social History    Marital status:      Spouse name: N/A    Number of children: N/A    Years of education: N/A     Occupational History    Not on file. Social History Main Topics    Smoking status: Former Smoker     Packs/day: 2.00     Years: 9.00     Quit date: 1/1/1963    Smokeless tobacco: Never Used    Alcohol use 0.5 oz/week     1 Standard drinks or equivalent per week      Comment: rare    Drug use: No    Sexual activity: Not Currently     Other Topics Concern    Not on file     Social History Narrative       Patient does have an advanced directive on file    Visit Vitals    /76 (BP 1 Location: Left arm, BP Patient Position: Sitting)    Pulse 67    Temp 97.6 °F (36.4 °C) (Tympanic)    Resp 16    Ht 5' (1.524 m)    Wt 147 lb (66.7 kg)    SpO2 97%    BMI 28.71 kg/m2       Physical Exam   Cardiovascular: Normal rate and regular rhythm. Exam reveals no gallop. No murmur heard. Pulmonary/Chest: She has no wheezes. She has rales (Dry rales).        BMI:  [facility] Out[facility] Visit on 03/15/2018   Component Date Value Ref Range Status    Sodium 03/15/2018 139  136 - 145 mmol/L Final    Potassium 03/15/2018 4.0  3.5 - 5.5 mmol/L Final    Chloride 03/15/2018 103  100 - 108 mmol/L Final    CO2 03/15/2018 30  21 - 32 mmol/L Final    Anion gap 03/15/2018 6  3.0 - 18 mmol/L Final    Glucose 03/15/2018 106* 74 - 99 mg/dL Final    BUN 03/15/2018 32* 7.0 - 18 MG/DL Final    Creatinine 03/15/2018 1.56* 0.6 - 1.3 MG/DL Final    BUN/Creatinine ratio 03/15/2018 21* 12 - 20   Final    GFR est AA 03/15/2018 38* >60 ml/min/1.73m2 Final    GFR est non-AA 03/15/2018 32* >60 ml/min/1.73m2 Final    Comment: (NOTE)  Estimated GFR is calculated using the Modification of Diet in Renal   Disease (MDRD) Study equation, reported for both  Americans   (GFRAA) and non- Americans (GFRNA), and normalized to 1.73m2   body surface area. The physician must decide which value applies to   the patient. The MDRD study equation should only be used in   individuals age 25 or older. It has not been validated for the   following: pregnant women, patients with serious comorbid conditions,   or on certain medications, or persons with extremes of body size,   muscle mass, or nutritional status.  Calcium 03/15/2018 8.9  8.5 - 10.1 MG/DL Final    Bilirubin, total 03/15/2018 0.4  0.2 - 1.0 MG/DL Final    ALT (SGPT) 03/15/2018 124* 13 - 56 U/L Final    AST (SGOT) 03/15/2018 63* 15 - 37 U/L Final    Alk.  phosphatase 03/15/2018 84  45 - 117 U/L Final    Protein, total 03/15/2018 7.0  6.4 - 8.2 g/dL Final    Albumin 03/15/2018 3.5  3.4 - 5.0 g/dL Final    Globulin 03/15/2018 3.5  2.0 - 4.0 g/dL Final    A-G Ratio 03/15/2018 1.0  0.8 - 1.7   Final   Admission on 02/21/2018, Discharged on 02/21/2018   Component Date Value Ref Range Status    WBC 02/21/2018 7.2  4.6 - 13.2 K/uL Final    RBC 02/21/2018 3.35* 4.20 - 5.30 M/uL Final    HGB 02/21/2018 10.6* 12.0 - 16.0 g/dL Final    HCT 02/21/2018 32.3* 35.0 - 45.0 % Final    MCV 02/21/2018 96.4  74.0 - 97.0 FL Final    MCH 02/21/2018 31.6  24.0 - 34.0 PG Final    MCHC 02/21/2018 32.8  31.0 - 37.0 g/dL Final    RDW 02/21/2018 13.7  11.6 - 14.5 % Final    PLATELET 64/97/4218 918  135 - 420 K/uL Final    MPV 02/21/2018 9.4  9.2 - 11.8 FL Final    Prothrombin time 02/21/2018 13.4  11.5 - 15.2 sec Final    INR 02/21/2018 1.1  0.8 - 1.2   Final    Comment:            INR Therapeutic Ranges         (on stable oral anticoagulant):     INDICATION                INR  DVT/PE/Atrial Fib          2.0-3.0  MI/Mechanical Heart Valve  2.5-3.5      aPTT 02/21/2018 24.1  23.0 - 36.4 SEC Final    Special Requests: 02/21/2018 NO SPECIAL REQUESTS    Preliminary    FUNGUS SMEAR 02/21/2018 NO FUNGAL ELEMENTS SEEN    Preliminary    Culture result: 02/21/2018 NO FUNGUS ISOLATED 19 DAYS    Preliminary    Special Requests: 02/21/2018 NO SPECIAL REQUESTS    Final    GRAM STAIN 02/21/2018 FEW WBC'S    Final    GRAM STAIN 02/21/2018 RARE GRAM POSITIVE COCCI IN PAIRS    Final    GRAM STAIN 02/21/2018 RARE GRAM POSITIVE RODS    Final    GRAM STAIN 02/21/2018 RARE GRAM NEGATIVE RODS    Final    Culture result: 02/21/2018 MANY NORMAL RESPIRATORY SOWMYA    Final    Source 02/21/2018 BRONCHIAL LAVAGE    Final    AFB Specimen processing 02/21/2018 Concentration   Final    Acid Fast Smear 02/21/2018 NEGATIVE     Final    Comment: (NOTE)  Performed At: [address] [id number]  Paulette Phillips MD :[id number]      Acid Fast Culture 02/21/2018 PENDING   Incomplete    [facility] [place] DIFF 02/21/2018        Final    [facility] NEUTROPHIL 02/21/2018 32  % Final    [facility] MACROPHAGES 02/21/2018 22  % Final    [facility] LYMPHS 02/21/2018 34  % Final    [facility] EOSINS 02/21/2018 12  % Final   Hospital Outpatient Visit on 12/18/2017   Component Date Value Ref Range Status    Sodium 12/18/2017 139  136 - 145 mmol/L Final    Potassium 12/18/2017 3.9  3.5 - 5.5 mmol/L Final    Chloride 12/18/2017 101  100 - 108 mmol/L Final    CO2 12/18/2017 29  21 - 32 mmol/L Final    Anion gap 12/18/2017 9  3.0 - 18 mmol/L Final    Glucose 12/18/2017 176* 74 - 99 mg/dL Final    BUN 12/18/2017 44* 7.0 - 18 MG/DL Final    Creatinine 12/18/2017 1.67* 0.6 - 1.3 MG/DL Final    BUN/Creatinine ratio 12/18/2017 26* 12 - 20   Final    GFR est AA 12/18/2017 35* >60 ml/min/1.73m2 Final    GFR est non-AA 12/18/2017 29* >60 ml/min/1.73m2 Final    Comment: (NOTE)  Estimated GFR is calculated using the Modification of Diet in Renal   Disease (MDRD) Study equation, reported for both  Americans   (GFRAA) and non- Americans (GFRNA), and normalized to 1.73m2   body surface area. The physician must decide which value applies to   the patient.  The MDRD study equation should only be used in   individuals age 25 or older. It has not been validated for the   following: pregnant women, patients with serious comorbid conditions,   or on certain medications, or persons with extremes of body size,   muscle mass, or nutritional status.  Calcium 12/18/2017 9.4  8.5 - 10.1 MG/DL Final       .  Results for orders placed or performed during the hospital encounter of 90/18/57   METABOLIC PANEL, COMPREHENSIVE   Result Value Ref Range    Sodium 139 136 - 145 mmol/L    Potassium 4.0 3.5 - 5.5 mmol/L    Chloride 103 100 - 108 mmol/L    CO2 30 21 - 32 mmol/L    Anion gap 6 3.0 - 18 mmol/L    Glucose 106 (H) 74 - 99 mg/dL    BUN 32 (H) 7.0 - 18 MG/DL    Creatinine 1.56 (H) 0.6 - 1.3 MG/DL    BUN/Creatinine ratio 21 (H) 12 - 20      GFR est AA 38 (L) >60 ml/min/1.73m2    GFR est non-AA 32 (L) >60 ml/min/1.73m2    Calcium 8.9 8.5 - 10.1 MG/DL    Bilirubin, total 0.4 0.2 - 1.0 MG/DL    ALT (SGPT) 124 (H) 13 - 56 U/L    AST (SGOT) 63 (H) 15 - 37 U/L    Alk. phosphatase 84 45 - 117 U/L    Protein, total 7.0 6.4 - 8.2 g/dL    Albumin 3.5 3.4 - 5.0 g/dL    Globulin 3.5 2.0 - 4.0 g/dL    A-G Ratio 1.0 0.8 - 1.7         Assessment / Plan      ICD-10-CM ICD-9-CM    1. Generalized abdominal pain M25.68 052.84 METABOLIC PANEL, COMPREHENSIVE      DUPLEX ABD VISC ART ORGANS COMPLETE      CANCELED: CT ABD PELV W CONT   2. Pulmonary fibrosis ([address] J84.10 515    3. 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 I asked Ann Samaniego. Marilyn Garzon if she has any questions and I answered the questions. Solis Garzon states that she understands the treatment plan and agrees with the treatment plan

## 2018-03-26 ENCOUNTER — HOSPITAL ENCOUNTER (OUTPATIENT)
Dept: VASCULAR SURGERY | Age: 83
Discharge: HOME | End: 2018-03-26
Attending: INTERNAL MEDICINE
Payer: MEDICARE

## 2018-03-26 DIAGNOSIS — R10.84 GENERALIZED ABDOMINAL PAIN: ICD-10-CM

## 2018-03-26 LAB
BACTERIA SPEC CULT: NORMAL
FUNGUS SMEAR,FNGSMR: NORMAL
SERVICE CMNT-IMP: NORMAL

## 2018-03-26 PROCEDURE — 93975 VASCULAR STUDY: CPT

## 2018-03-26 NOTE — PROCEDURES
[address]  *** FINAL REPORT ***    Name: Denita Tuttle  MRN: [id number]    Outpatient  : 23 Aug 1932  HIS Order #: [id number]  [address] [facility] Visit #: [id number]  Date: 26 Mar 2018    TYPE OF TEST: Visceral Arterial Duplex    REASON FOR TEST    Aortic PSV:  88.0 cm/s  Diameter AP: 1.5 cm   TV: 1.5 cm    Mesenteric:-                  Prox   Mid   Dist Ratio Stenosis          Aneurysm                  ----- ----- ----- ----- ----------------- ------------  SMA:             90.0 134.0 154.0  1.8 Normal  Celiac:         165.0               1.9 Normal  Hepatic:  Splenic:  YULIANA:  :    INTERPRETATION/FINDINGS  Duplex images were obtained using 2-D gray scale, color flow, and  spectral Doppler analysis. MESENTERIC:  1. No significant stenosis identified in the superior mesenteric  artery. 2. No significant stenosis identified in the celiac artery. 3. No evidence of aneurysm noted in the abdominal aorta. ADDITIONAL COMMENTS    I have personally reviewed the data relevant to the interpretation of  this  study. TECHNOLOGIST: Rylee Garcia, [place] [facility], RVT/  Signed: 2018 09:39 AM    PHYSICIAN: Rasta Carrasco.  Ivanna Moran MD  Signed: 2018 11:50 AM

## 2018-03-28 ENCOUNTER — OFFICE VISIT (OUTPATIENT)
Dept: INTERNAL MEDICINE CLINIC | Age: 83
End: 2018-03-28

## 2018-03-28 VITALS
RESPIRATION RATE: 16 BRPM | HEIGHT: 60 IN | HEART RATE: 52 BPM | TEMPERATURE: 97.2 F | BODY MASS INDEX: 27.88 KG/M2 | DIASTOLIC BLOOD PRESSURE: 70 MMHG | OXYGEN SATURATION: 99 % | WEIGHT: 142 LBS | SYSTOLIC BLOOD PRESSURE: 132 MMHG

## 2018-04-05 LAB
ACID FAST STN SPEC: NEGATIVE
MYCOBACTERIUM SPEC QL CULT: NEGATIVE
SPECIMEN PREPARATION: NORMAL
SPECIMEN SOURCE: NORMAL

## 2018-04-17 ENCOUNTER — HOSPITAL ENCOUNTER (OUTPATIENT)
Dept: CT IMAGING | Age: 83
Discharge: HOME | End: 2018-04-17
Attending: INTERNAL MEDICINE
Payer: MEDICARE

## 2018-04-17 DIAGNOSIS — J84.9 INTERSTITIAL PULMONARY DISEASE (HCC): ICD-10-CM

## 2018-04-17 PROCEDURE — 71250 CT THORAX DX C-: CPT

## 2018-05-07 ENCOUNTER — OFFICE VISIT (OUTPATIENT)
Dept: CARDIOLOGY CLINIC | Age: 83
End: 2018-05-07

## 2018-05-07 VITALS — BODY MASS INDEX: 28.27 KG/M2 | HEIGHT: 60 IN | OXYGEN SATURATION: 98 % | WEIGHT: 144 LBS | HEART RATE: 53 BPM

## 2018-05-07 DIAGNOSIS — I45.2 BIFASCICULAR BUNDLE BRANCH BLOCK: Primary | ICD-10-CM

## 2018-05-07 DIAGNOSIS — M35.3 PMR (POLYMYALGIA RHEUMATICA) (HCC): ICD-10-CM

## 2018-05-07 DIAGNOSIS — E78.00 PURE HYPERCHOLESTEROLEMIA: ICD-10-CM

## 2018-05-07 DIAGNOSIS — J84.10 PULMONARY FIBROSIS (HCC): ICD-10-CM

## 2018-05-07 DIAGNOSIS — I10 ESSENTIAL HYPERTENSION: ICD-10-CM

## 2018-05-07 DIAGNOSIS — R94.31 ABNORMAL ECG: ICD-10-CM

## 2018-05-07 NOTE — MR AVS SNAPSHOT
[address] [place] [address] [address] [facility] [address] [phone] Patient: Aminta Rosario MRN: [id number] YHJ:[birth year] Visit Information Date & Time Provider Department Dept. Phone Encounter #  
 5/7/2018 10:40 AM Tu Rosario MD [facility] [id number] Your Appointments 5/9/2018  9:45 AM  
Follow Up with Kishor Ram MD  
[address] [place] [address] [address] [place] [address]) Appt Note: 6wk  
 [address] [facility], [address] [facility] [address] [address]  
  
   
 [address], [address] [facility] [address] Upcoming Health Maintenance Date Due  
 GLAUCOMA SCREENING Q2Y 3/1/2018 Influenza Age 5 to Adult 8/1/2018 MEDICARE YEARLY EXAM 9/26/2018 DTaP/Tdap/Td series (2 - Td) 10/20/2026 Allergies as of 5/7/2018  Review Complete On: 5/7/2018 By: Tu Rosario MD  
  
 Severity Noted Reaction Type Reactions Adhesive Tape-silicones  11/24/6644    Rash Codeine  08/04/2014    Hives Demerol [Meperidine]  08/04/2014    Nausea Only Levaquin [Levofloxacin]  08/04/2014    Other (comments) Swelling in feet and legs Current Immunizations  Reviewed on 3/12/2018 Name Date Influenza High Dose Vaccine PF 9/25/2017, 10/13/2016  8:53 AM  
 Influenza Vaccine 10/6/2015, 9/1/2013 12:00 AM  
 Pneumococcal Conjugate (PCV-13) 10/6/2015 Pneumococcal Polysaccharide (PPSV-23) 10/14/2015, 4/9/2010, 4/11/2003, 9/9/1993 Td 7/30/2008, 9/9/1993 Tdap 10/20/2016  8:40 AM  
 Zoster Vaccine, Live 10/16/2017, 1/14/2009 Not reviewed this visit You Were Diagnosed With   
  
 Codes Comments Abnormal ECG    -  Primary ICD-10-CM: R94.31 
ICD-9-CM: 794.31 Dyspnea, unspecified type     ICD-10-CM: R06.00 
ICD-9-CM: 786.09   
 Pulmonary fibrosis ([facility] [address]     ICD-10-CM: J84.10 ICD-9-CM: 123 PMR (polymyalgia rheumatica) (HCC)     ICD-10-CM: M35.3 ICD-9-CM: 486 Essential hypertension     ICD-10-CM: I10 
ICD-9-CM: 401.9 Pure hypercholesterolemia     ICD-10-CM: E78.00 ICD-9-CM: 272.0 Vitals BP Pulse Height(growth percentile) Weight(growth percentile) SpO2 BMI  
 (P) 130/70 (!) 53 5' (1.524 m) 144 lb (65.3 kg) 98% 28.12 kg/m2 OB Status Smoking Status Hysterectomy Former Smoker Vitals History BMI and BSA Data Body Mass Index Body Surface Area  
 28.12 kg/m 2 1.66 m 2 Preferred Pharmacy Pharmacy Name Phone [facility] Miguel Angel Perdomo [address] [place] [address] [phone] Your Updated Medication List  
  
   
This list is accurate as of 5/7/18 11:21 AM.  Always use your most recent med list.  
  
  
  
  
 apixaban 2.5 mg tablet Commonly known as:  [place] Take 1 Tab by mouth two (2) times a day. CENTRUM SILVER Tab tablet Generic drug:  multivitamins-minerals-lutein Take 1 Tab by mouth daily. CITRACAL + D MAXIMUM PO Take 2 Tabs by mouth daily. clobetasol 0.05 % topical foam  
Commonly known as:  OLUX Apply  to affected area two (2) times daily as needed for Skin Irritation. use thin film on affected area  
  
 cyanocobalamin 1,000 mcg tablet Take 1,000 mcg by mouth daily. dicyclomine 10 mg capsule Commonly known as:  BENTYL 1 three times per day (with each meal) doxepin 25 mg capsule Commonly known as:  SINEquan Take 1 Cap by mouth nightly. folic acid 663 mcg tablet Take 800 mcg by mouth daily. furosemide 20 mg tablet Commonly known as:  LASIX  
1 tablet each AM  
  
 guanFACINE IR 1 mg IR tablet Commonly known as:  TENEX  
TAKE ONE-HALF TABLET BY  MOUTH EVERY DAY  
  
 levothyroxine 100 mcg tablet Commonly known as:  SYNTHROID Take 1 Tab by mouth nightly. loratadine 10 mg tablet Commonly known as:  Jennifer Badder Take 10 mg by mouth daily. losartan 100 mg tablet Commonly known as:  COZAAR Take 1 Tab by mouth daily. metoprolol tartrate 50 mg tablet Commonly known as:  LOPRESSOR Take 1 Tab by mouth two (2) times a day. OSTEO BI-FLEX PO Take 2 Tabs by mouth daily. predniSONE 5 mg tablet Commonly known as:  [place] Take 2 tablets (10 mg) by mouth once daily. Decrease to 1 1/2 tablets (7.5 mg) by mouth once daily on October 15, 2017. On 11/7/2017, dose will be further reduced to 1 tablet (5 mg) by mouth once daily as directed by Dr. Kita Pillai. raNITIdine 300 mg tablet Commonly known as:  ZANTAC Take 1 Tab by mouth daily. simvastatin 20 mg tablet Commonly known as:  ZOCOR Take 1 Tab by mouth nightly. SYSTANE (PROPYLENE GLYCOL) 0.4-0.3 % Drop Generic drug:  peg 400-propylene glycol Administer 1 Drop to both eyes as needed. tamoxifen 20 mg tablet Commonly known as:  NOLVADEX Take 20 mg by mouth daily. triamcinolone acetonide 0.1 % topical cream  
Commonly known as:  KENALOG Apply daily We Performed the Following AMB POC EKG ROUTINE W/ 12 LEADS, INTER & REP [85481 CPT(R)] Introducing [facility]! [facility] introduces [employer] patient portal. Now you can access parts of your medical record, email your doctor's office, and request medication refills online. 1. In your internet browser, go to [email or web address] 2. Click on the First Time User? Click Here link in the Sign In box. You will see the New Member Sign Up page. 3. Enter your [employer] Access Code exactly as it appears below. You will not need to use this code after youve completed the sign-up process. If you do not sign up before the expiration date, you must request a new code. · [employer] Access Code: [email or web address] Expires: 5/22/2018  9:20 AM 
 
4. Enter the last four digits of your Social Security Number (xxxx) and Date of Birth (mm/dd/yyyy) as indicated and click Submit.  You will be taken to the next sign-up page. 5. Create a [employer] ID. This will be your [employer] login ID and cannot be changed, so think of one that is secure and easy to remember. 6. Create a [employer] password. You can change your password at any time. 7. Enter your Password Reset Question and Answer. This can be used at a later time if you forget your password. 8. Enter your e-mail address. You will receive e-mail notification when new information is available in [address] 9. Click Sign Up. You can now view and download portions of your medical record. 10. Click the Download Summary menu link to download a portable copy of your medical information. If you have questions, please visit the Frequently Asked Questions section of the [employer] website. Remember, [employer] is NOT to be used for urgent needs. For medical emergencies, dial 911. Now available from your iPhone and Android! Please provide this summary of care documentation to your next provider. Your primary care clinician is listed as Hermilo Geiger. If you have any questions after today's visit, please call [phone].

## 2018-05-07 NOTE — PROGRESS NOTES
HISTORY OF PRESENT ILLNESS  Burnadette Dandy is a 80 y.o. female. HPI  She has been feeling reasonably well. She continues with chronic dyspnea on exertion which has not been any worse. She has no resting dyspnea, orthopnea, PND. She denies wheezing. She has had no chest pain. She denies palpitation, dizziness or syncope. She denies any symptoms of TIA or amaurosis fugax. She underwent stress nuclear cardiac imaging on 11/21/17 which demonstrated normal perfusion with no evidence of scarring or ischemia. EF was in the 79% range. She went on to have a lung biopsy on 2/8/18 which demonstrated apparently no evidence of malignancy, but evidence of pulmonary fibrosis of unknown etiology. She has been found to have severe pulmonary fibrosis by CT scanning, which has been progressively worsening on repeat CT scans despite the fact that she has been on prednisone for polymyalgia rheumatica and for which she was seen by a rheumatologist who apparently discovered the pulmonary fibrosis on the CT scan. Despite the worsening CT findings of pulmonary fibrosis, her symptoms have remained to be very mild. Her pulmonologist has been considering a lung biopsy for the possibility of a different diagnosis other than pulmonary fibrosis. She has had leg edema and has been found to have nonobstructive venous thrombosis and has been placed on Eliquis.       She had an echocardiogram on 11/09/2017, which demonstrated normal LV function with EF in the 60-65% range and  grade I diastolic dysfunction parameters. There was no significant valvular pathology. PA pressure was estimated at around 29 mmHg. Left atrial dimension was normal and so was the right atrial dimension. Right ventricular size was normal with normal systolic function.       She is a nonsmoker. She has history of hypertension and diabetes mellitus. She also has a family history of coronary artery disease.          Review of Systems   Constitutional: Negative for malaise/fatigue and weight loss. HENT: Negative for hearing loss. Eyes: Negative for blurred vision and double vision. Respiratory: Positive for shortness of breath. Cardiovascular: Positive for leg swelling. Negative for chest pain, palpitations, orthopnea, claudication and PND. Gastrointestinal: Negative for blood in stool, heartburn and melena. Genitourinary: Negative for dysuria, frequency, hematuria and urgency. Musculoskeletal: Negative for back pain and joint pain. Skin: Negative for itching and rash. Neurological: Negative for dizziness, loss of consciousness and weakness. Psychiatric/Behavioral: Negative for depression and memory loss. Physical Exam   Constitutional: She is oriented to person, place, and time. She appears well-developed and well-nourished. HENT:   Head: Normocephalic and atraumatic. Eyes: Conjunctivae are normal. Pupils are equal, round, and reactive to light. Neck: Normal range of motion. Neck supple. No JVD present. Cardiovascular: Regular rhythm, S1 normal and S2 normal.   No extrasystoles are present. Bradycardia present. PMI is not displaced. Exam reveals no gallop and no friction rub. Murmur heard. Harsh early systolic murmur is present with a grade of 1/6  at the upper right sternal border  Pulses:       Carotid pulses are 3+ on the right side, and 3+ on the left side. Pulmonary/Chest: Effort normal. She has rales. Abdominal: Soft. There is no tenderness. Musculoskeletal: She exhibits edema. Neurological: She is alert and oriented to person, place, and time. No cranial nerve deficit. Skin: Skin is warm and dry. Psychiatric: She has a normal mood and affect.  Her behavior is normal.     Visit Vitals    BP (P) 130/70    Pulse (!) 53    Ht 5' (1.524 m)    Wt 65.3 kg (144 lb)    SpO2 98%    BMI 28.12 kg/m2       Past Medical History:   Diagnosis Date    Autoimmune disease ([facility] [address] [facility]     [facility] 2001, 1/ 2014 right, left    Cancer (HCC)     Colon polyps     Elevated cholesterol     Enlarged lymph nodes     pericardium    GERD (gastroesophageal reflux disease)     Hypertension     Ill-defined condition 2007 to 2010    lung-ground glass followed by Dr. Rinaldo Rinne Insomnia     Lichen plano-pilaris     Mass of breast, right 1976    benign    Mass of breast, right 1994    benign    Pure hyperglyceridemia     Thromboembolus ([facility] 09/2017    blood clot right leg    Thyroid disease        Social History     Social History    Marital status:      Spouse name: N/A    Number of children: N/A    Years of education: N/A     Occupational History    Not on file.      Social History Main Topics    Smoking status: Former Smoker     Packs/day: 2.00     Years: 9.00     Quit date: 1/1/1963    Smokeless tobacco: Never Used    Alcohol use No      Comment: rare    Drug use: No    Sexual activity: Not Currently     Other Topics Concern    Not on file     Social History Narrative       Family History   Problem Relation Age of Onset    Hypertension Mother     COPD Mother     Heart Disease Father        Past Surgical History:   Procedure Laterality Date    HX APPENDECTOMY      HX CATARACT REMOVAL Bilateral 2010    HX CHOLECYSTECTOMY      HX HEMORRHOIDECTOMY      HX HERNIA REPAIR  2004    small abdominal repair and fistula    HX HYSTERECTOMY  1991    cystocele    HX LYMPHADENECTOMY Right 7/2001    right axilla    HX MASTECTOMY Right 2001    with reconstruction    HX MASTECTOMY Left 1/14    HX POLYPECTOMY      [id number] muliple removals    HX RECTOCELE REPAIR  2003       Current Outpatient Prescriptions   Medication Sig Dispense Refill    dicyclomine (BENTYL) 10 mg capsule 1 three times per day (with each meal) 90 Cap 2    guanFACINE IR (TENEX) 1 mg IR tablet TAKE ONE-HALF TABLET BY  MOUTH EVERY DAY 45 Tab 3    triamcinolone acetonide (KENALOG) 0.1 % topical cream Apply daily 45 g 1    predniSONE (DELTASONE) 5 mg tablet Take 2 tablets (10 mg) by mouth once daily. Decrease to 1 1/2 tablets (7.5 mg) by mouth once daily on October 15, 2017. On 11/7/2017, dose will be further reduced to 1 tablet (5 mg) by mouth once daily as directed by Dr. Harinder Romo.  CALCIUM CITRATE/VITAMIN D3 (CITRACAL + D MAXIMUM PO) Take 2 Tabs by mouth daily.  folic acid 663 mcg tablet Take 800 mcg by mouth daily.  losartan (COZAAR) 100 mg tablet Take 1 Tab by mouth daily. 90 Tab 3    apixaban (ELIQUIS) 2.5 mg tablet Take 1 Tab by mouth two (2) times a day. 60 Tab 0    simvastatin (ZOCOR) 20 mg tablet Take 1 Tab by mouth nightly. 90 Tab 3    raNITIdine (ZANTAC) 300 mg tablet Take 1 Tab by mouth daily. 90 Tab 3    doxepin (SINEQUAN) 25 mg capsule Take 1 Cap by mouth nightly. 90 Cap 3    metoprolol tartrate (LOPRESSOR) 50 mg tablet Take 1 Tab by mouth two (2) times a day. 180 Tab 3    levothyroxine (SYNTHROID) 100 mcg tablet Take 1 Tab by mouth nightly. 90 Tab 3    furosemide (LASIX) 20 mg tablet 1 tablet each AM (Patient taking differently: Take 30 mg by mouth daily. 1 tablet each AM) 90 Tab 3    peg 400-propylene glycol (SYSTANE, PROPYLENE GLYCOL,) 0.4-0.3 % drop Administer 1 Drop to both eyes as needed.  GLUCOSAMINE HCL/CHONDR LEGER A NA (OSTEO BI-FLEX PO) Take 2 Tabs by mouth daily.  cyanocobalamin 1,000 mcg tablet Take 1,000 mcg by mouth daily.  multivitamins-minerals-lutein (CENTRUM SILVER) tab tablet Take 1 Tab by mouth daily.  loratadine (CLARITIN) 10 mg tablet Take 10 mg by mouth daily.  tamoxifen (NOLVADEX) 20 mg tablet Take 20 mg by mouth daily.  clobetasol (OLUX) 0.05 % topical foam Apply  to affected area two (2) times daily as needed for Skin Irritation. use thin film on affected area         EKG: unchanged from previous tracings, sinus bradycardia, RBBB, left axis deviation, rotation of the heart  . ASSESSMENT and PLAN  Encounter Diagnoses   Name Primary?     Bifascicular bundle branch block Yes    Abnormal ECG     Pulmonary fibrosis (HCC)     PMR (polymyalgia rheumatica) (HCC)     Essential hypertension     Pure hypercholesterolemia    She has been doing well from a cardiac standpoint. She has had no symptoms to indicate angina or cardiac decompensation. Her stress nuclear cardiac imaging demonstrated no evidence of ischemia and normal LV function. The pulmonary fibrosis apparently was confirmed by lung biopsy. She was once again advised that her abnormal EKG was related to location of the heart and bifascicular heart block. She was advised that sometime in her lifetime there may be a possibility that she might require a pacemaker, but not at this time. She was advised to be aware of anginal symptoms such as exertional chest tightness, pressure, throat tightness, jaw pain, upper back pain between the scapulae or exertional arm or shoulder pain.

## 2018-05-07 NOTE — PROGRESS NOTES
1. Have you been to the ER, urgent care clinic since your last visit? Hospitalized since your last visit? Yes 02/21/2018 for lung   disease  2. Have you seen or consulted any other health care providers outside of the [address] [place] [address] since your last visit? Include any pap smears or colon screening.  No

## 2018-05-09 ENCOUNTER — OFFICE VISIT (OUTPATIENT)
Dept: INTERNAL MEDICINE CLINIC | Age: 83
End: 2018-05-09

## 2018-05-09 ENCOUNTER — HOSPITAL ENCOUNTER (OUTPATIENT)
Dept: LAB | Age: 83
Discharge: HOME | End: 2018-05-09
Payer: MEDICARE

## 2018-05-09 VITALS
TEMPERATURE: 97.5 F | RESPIRATION RATE: 18 BRPM | HEIGHT: 60 IN | WEIGHT: 143 LBS | SYSTOLIC BLOOD PRESSURE: 136 MMHG | OXYGEN SATURATION: 92 % | HEART RATE: 74 BPM | DIASTOLIC BLOOD PRESSURE: 70 MMHG | BODY MASS INDEX: 28.07 KG/M2

## 2018-05-09 DIAGNOSIS — J84.10 PULMONARY FIBROSIS (HCC): ICD-10-CM

## 2018-05-09 DIAGNOSIS — I10 ESSENTIAL HYPERTENSION: ICD-10-CM

## 2018-05-09 DIAGNOSIS — E78.00 PURE HYPERCHOLESTEROLEMIA: ICD-10-CM

## 2018-05-09 DIAGNOSIS — R10.84 GENERALIZED ABDOMINAL PAIN: ICD-10-CM

## 2018-05-09 DIAGNOSIS — R10.84 GENERALIZED ABDOMINAL PAIN: Primary | ICD-10-CM

## 2018-05-09 DIAGNOSIS — M35.3 PMR (POLYMYALGIA RHEUMATICA) (HCC): ICD-10-CM

## 2018-05-09 LAB
ALBUMIN SERPL-MCNC: 3.6 G/DL (ref 3.4–5)
ALBUMIN/GLOB SERPL: 1.1 {RATIO} (ref 0.8–1.7)
ALP SERPL-CCNC: 62 U/L (ref 45–117)
ALT SERPL-CCNC: 42 U/L (ref 13–56)
ANION GAP SERPL CALC-SCNC: 7 MMOL/L (ref 3–18)
AST SERPL-CCNC: 25 U/L (ref 15–37)
BASOPHILS # BLD: 0 K/UL (ref 0–0.06)
BASOPHILS NFR BLD: 1 % (ref 0–2)
BILIRUB SERPL-MCNC: 0.4 MG/DL (ref 0.2–1)
BUN SERPL-MCNC: 33 MG/DL (ref 7–18)
BUN/CREAT SERPL: 23 (ref 12–20)
CALCIUM SERPL-MCNC: 9.3 MG/DL (ref 8.5–10.1)
CHLORIDE SERPL-SCNC: 101 MMOL/L (ref 100–108)
CHOLEST SERPL-MCNC: 151 MG/DL
CO2 SERPL-SCNC: 29 MMOL/L (ref 21–32)
CREAT SERPL-MCNC: 1.45 MG/DL (ref 0.6–1.3)
CRP SERPL-MCNC: <0.3 MG/DL (ref 0–0.3)
DIFFERENTIAL METHOD BLD: ABNORMAL
EOSINOPHIL # BLD: 0.5 K/UL (ref 0–0.4)
EOSINOPHIL NFR BLD: 6 % (ref 0–5)
ERYTHROCYTE [DISTWIDTH] IN BLOOD BY AUTOMATED COUNT: 14.1 % (ref 11.6–14.5)
ERYTHROCYTE [SEDIMENTATION RATE] IN BLOOD: 60 MM/HR (ref 0–30)
GLOBULIN SER CALC-MCNC: 3.4 G/DL (ref 2–4)
GLUCOSE SERPL-MCNC: 98 MG/DL (ref 74–99)
HCT VFR BLD AUTO: 34.7 % (ref 35–45)
HDLC SERPL-MCNC: 68 MG/DL (ref 40–60)
HDLC SERPL: 2.2 {RATIO} (ref 0–5)
HGB BLD-MCNC: 11 G/DL (ref 12–16)
LDLC SERPL CALC-MCNC: 52 MG/DL (ref 0–100)
LIPID PROFILE,FLP: ABNORMAL
LYMPHOCYTES # BLD: 1.4 K/UL (ref 0.9–3.6)
LYMPHOCYTES NFR BLD: 18 % (ref 21–52)
MCH RBC QN AUTO: 31.3 PG (ref 24–34)
MCHC RBC AUTO-ENTMCNC: 31.7 G/DL (ref 31–37)
MCV RBC AUTO: 98.9 FL (ref 74–97)
MONOCYTES # BLD: 0.8 K/UL (ref 0.05–1.2)
MONOCYTES NFR BLD: 11 % (ref 3–10)
NEUTS SEG # BLD: 5 K/UL (ref 1.8–8)
NEUTS SEG NFR BLD: 64 % (ref 40–73)
PLATELET # BLD AUTO: 244 K/UL (ref 135–420)
PMV BLD AUTO: 9.3 FL (ref 9.2–11.8)
POTASSIUM SERPL-SCNC: 3.7 MMOL/L (ref 3.5–5.5)
PROT SERPL-MCNC: 7 G/DL (ref 6.4–8.2)
RBC # BLD AUTO: 3.51 M/UL (ref 4.2–5.3)
SODIUM SERPL-SCNC: 137 MMOL/L (ref 136–145)
TRIGL SERPL-MCNC: 155 MG/DL (ref ?–150)
TSH SERPL DL<=0.05 MIU/L-ACNC: 1.15 UIU/ML (ref 0.36–3.74)
VLDLC SERPL CALC-MCNC: 31 MG/DL
WBC # BLD AUTO: 7.7 K/UL (ref 4.6–13.2)

## 2018-05-09 PROCEDURE — 84443 ASSAY THYROID STIM HORMONE: CPT | Performed by: INTERNAL MEDICINE

## 2018-05-09 PROCEDURE — 85025 COMPLETE CBC W/AUTO DIFF WBC: CPT | Performed by: INTERNAL MEDICINE

## 2018-05-09 PROCEDURE — 86140 C-REACTIVE PROTEIN: CPT | Performed by: INTERNAL MEDICINE

## 2018-05-09 PROCEDURE — 36415 COLL VENOUS BLD VENIPUNCTURE: CPT | Performed by: INTERNAL MEDICINE

## 2018-05-09 PROCEDURE — 80053 COMPREHEN METABOLIC PANEL: CPT | Performed by: INTERNAL MEDICINE

## 2018-05-09 PROCEDURE — 80061 LIPID PANEL: CPT | Performed by: INTERNAL MEDICINE

## 2018-05-09 PROCEDURE — 85652 RBC SED RATE AUTOMATED: CPT | Performed by: INTERNAL MEDICINE

## 2018-05-09 RX ORDER — PREDNISONE 2.5 MG/1
TABLET ORAL
COMMUNITY
End: 2018-10-29

## 2018-05-09 NOTE — MR AVS SNAPSHOT
[address] [place] [address] 
 
 
 [address], [address] [place] [address] 
[phone] Patient: Jayson Malave MRN: [id number] Visit Information Date & Time Provider Department Dept. Phone Encounter #  
 5/9/2018  9:45 AM Mariam Way MD [facility] [place] [phone] [id number] Your Appointments 8/13/2018 11:00 AM  
Follow Up with Mariam Way MD  
[facility]) Appt Note: 3 month [address] [place] [address]  
  
   
 [address], [address] [place] [address]  
  
    
 11/12/2018 11:40 AM  
Follow Up with Antonina Deal MD  
[facility] ([facility]) Appt Note: 6 month f/up [place] [id number] [address] [address] [phone] [address] [place] [address] [address] Upcoming Health Maintenance Date Due  
 GLAUCOMA SCREENING Q2Y 3/1/2018 Influenza Age 5 to Adult 8/1/2018 MEDICARE YEARLY EXAM 9/26/2018 DTaP/Tdap/Td series (2 - Td) 10/20/2026 Allergies as of 5/9/2018  Review Complete On: 5/9/2018 By: Tracy Thornton LPN Severity Noted Reaction Type Reactions Adhesive Tape-silicones  50/73/7758    Rash Codeine  08/04/2014    Hives Demerol [Meperidine]  08/04/2014    Nausea Only Levaquin [Levofloxacin]  08/04/2014    Other (comments) Swelling in feet and legs Current Immunizations  Reviewed on 3/12/2018 Name Date Influenza High Dose Vaccine PF 9/25/2017, 10/13/2016  8:53 AM  
 Influenza Vaccine 10/6/2015, 9/1/2013 12:00 AM  
 Pneumococcal Conjugate (PCV-13) 10/6/2015 Pneumococcal Polysaccharide (PPSV-23) 10/14/2015, 4/9/2010, 4/11/2003, 9/9/1993 Td 7/30/2008, 9/9/1993 Tdap 10/20/2016  8:40 AM  
 Zoster Vaccine, Live 10/16/2017, 1/14/2009 Not reviewed this visit You Were Diagnosed With   
  
 Codes Comments Generalized abdominal pain    -  Primary ICD-10-CM: R10.84 ICD-9-CM: 789.07   
 Pulmonary fibrosis ([address]     ICD-10-CM: J84.10 ICD-9-CM: 129 PMR (polymyalgia rheumatica) (HCC)     ICD-10-CM: M35.3 ICD-9-CM: 723 Essential hypertension     ICD-10-CM: I10 
ICD-9-CM: 401.9 Pure hypercholesterolemia     ICD-10-CM: E78.00 ICD-9-CM: 272.0 Vitals BP Pulse Temp Resp Height(growth percentile) 136/70 (BP 1 Location: Left arm, BP Patient Position: Sitting) 74 97.5 °F (36.4 °C) (Tympanic) 18 5' (1.524 m) Weight(growth percentile) SpO2 BMI OB Status Smoking Status 143 lb (64.9 kg) 92% 27.93 kg/m2 Hysterectomy Former Smoker BMI and BSA Data Body Mass Index Body Surface Area  
 27.93 kg/m 2 1.66 m 2 Preferred Pharmacy Pharmacy Name Phone [facility] [address] [place] [phone] Your Updated Medication List  
  
   
This list is accurate as of 5/9/18 11:34 AM.  Always use your most recent med list.  
  
  
  
  
 apixaban 2.5 mg tablet Commonly known as:  [place] Take 1 Tab by mouth two (2) times a day. CENTRUM SILVER Tab tablet Generic drug:  multivitamins-minerals-lutein Take 1 Tab by mouth daily. CITRACAL + D MAXIMUM PO Take 2 Tabs by mouth daily. clobetasol 0.05 % topical foam  
Commonly known as:  OLUX Apply  to affected area two (2) times daily as needed for Skin Irritation. use thin film on affected area  
  
 cyanocobalamin 1,000 mcg tablet Take 1,000 mcg by mouth daily. dicyclomine 10 mg capsule Commonly known as:  BENTYL 1 three times per day (with each meal) doxepin 25 mg capsule Commonly known as:  SINEquan Take 1 Cap by mouth nightly. folic acid 690 mcg tablet Take 800 mcg by mouth daily. furosemide 20 mg tablet Commonly known as:  LASIX  
1 tablet each AM  
  
 guanFACINE IR 1 mg IR tablet Commonly known as:  TENEX  
TAKE ONE-HALF TABLET BY  MOUTH EVERY DAY  
  
 levothyroxine 100 mcg tablet Commonly known as:  SYNTHROID Take 1 Tab by mouth nightly. loratadine 10 mg tablet Commonly known as:  Gene She Take 10 mg by mouth daily. losartan 100 mg tablet Commonly known as:  COZAAR Take 1 Tab by mouth daily. metoprolol tartrate 50 mg tablet Commonly known as:  LOPRESSOR Take 1 Tab by mouth two (2) times a day. OSTEO BI-FLEX PO Take 2 Tabs by mouth daily. * predniSONE 5 mg tablet Commonly known as:  Garrel Moron Take 2 tablets (10 mg) by mouth once daily. Decrease to 1 1/2 tablets (7.5 mg) by mouth once daily on October 15, 2017. On 11/7/2017, dose will be further reduced to 1 tablet (5 mg) by mouth once daily as directed by Dr. Wallace Santizo. * predniSONE 2.5 mg tablet Commonly known as:  Garrel Moron Take  by mouth. raNITIdine 300 mg tablet Commonly known as:  ZANTAC Take 1 Tab by mouth daily. simvastatin 20 mg tablet Commonly known as:  ZOCOR Take 1 Tab by mouth nightly. SYSTANE (PROPYLENE GLYCOL) 0.4-0.3 % Drop Generic drug:  peg 400-propylene glycol Administer 1 Drop to both eyes as needed. tamoxifen 20 mg tablet Commonly known as:  NOLVADEX Take 20 mg by mouth daily. triamcinolone acetonide 0.1 % topical cream  
Commonly known as:  KENALOG Apply daily * Notice: This list has 2 medication(s) that are the same as other medications prescribed for you. Read the directions carefully, and ask your doctor or other care provider to review them with you. We Performed the Following REFERRAL TO GASTROENTEROLOGY [RFR72 Custom] Comments:  
 Please evaluate patient for abdominal pain. Referral Information Referral ID Referred By Referred To  
  
 [id number] Stephanie Reeves MD   
   [address] [place] [address] [address] [place], [address] Phone: [phone] Fax: [phone] Visits Status Start Date End Date 1 New Request 5/9/18 5/9/19 If your referral has a status of pending review or denied, additional information will be sent to support the outcome of this decision. Introducing [facility]! Yonis Mcpherson introduces [employer] patient portal. Now you can access parts of your medical record, email your doctor's office, and request medication refills online. 1. In your internet browser, go to [email or web address]. [employer] 2. Click on the First Time User? Click Here link in the Sign In box. You will see the New Member Sign Up page. 3. Enter your [employer] Access Code exactly as it appears below. You will not need to use this code after youve completed the sign-up process. If you do not sign up before the expiration date, you must request a new code. · [employer] Access Code: [email or web address] Expires: 5/22/2018  9:20 AM 
 
4. Enter the last four digits of your Social Security Number (xxxx) and Date of Birth (mm/dd/yyyy) as indicated and click Submit. You will be taken to the next sign-up page. 5. Create a [employer] ID. This will be your [employer] login ID and cannot be changed, so think of one that is secure and easy to remember. 6. Create a [employer] password. You can change your password at any time. 7. Enter your Password Reset Question and Answer. This can be used at a later time if you forget your password. 8. Enter your e-mail address. You will receive e-mail notification when new information is available in [address] 9. Click Sign Up. You can now view and download portions of your medical record. 10. Click the Download Summary menu link to download a portable copy of your medical information. If you have questions, please visit the Frequently Asked Questions section of the [employer] website.  Remember, [employer] is NOT to be used for urgent needs. For medical emergencies, dial 911. Now available from your iPhone and Android! Please provide this summary of care documentation to your next provider. Your primary care clinician is listed as Andra Malave. If you have any questions after today's visit, please call [phone].

## 2018-05-09 NOTE — PROGRESS NOTES
Chief Complaint   Patient presents with    Well Woman     . Is someone accompanying this pt? no    Is the patient using any DME equipment during OV? no    Depression Screening:  PHQ over the last two weeks 3/5/2018 10/5/2017 9/25/2017 9/7/2017 3/15/2017 3/7/2016   Little interest or pleasure in doing things Several days Several days Not at all Several days Not at all Not at all   Feeling down, depressed or hopeless Several days Several days Not at all Several days Not at all Not at all   Total Score PHQ 2 2 2 0 2 0 0       Learning Assessment:  Learning Assessment 10/5/2017 9/7/2017 6/12/2017 3/7/2016   PRIMARY LEARNER Patient Patient Patient Patient   HIGHEST LEVEL OF EDUCATION - PRIMARY LEARNER  - - [address] LEARNER - - [address] CAREGIVER - - No No   PRIMARY [address] [facility]    NEED - - - No   LEARNER PREFERENCE PRIMARY LISTENING LISTENING DEMONSTRATION DEMONSTRATION     - - - READING   ANSWERED BY patient charlynet patient patient   RELATIONSHIP SELF SELF SELF SELF       Abuse Screening:  Abuse Screening Questionnaire 9/25/2017 6/12/2017 7/12/2016   Do you ever feel afraid of your partner? N N N   Are you in a relationship with someone who physically or mentally threatens you? N N N   Is it safe for you to go home? Lin Boyd       Fall Risk  Fall Risk Assessment, last 12 mths 3/5/2018 9/25/2017 3/15/2017 12/16/2016 10/30/2015   Able to walk? Yes Yes Yes Yes Yes   Fall in past 12 months? No No No No Yes   Fall with injury? - - - - Yes   Number of falls in past 12 months - - - - 1   Fall Risk Score - - - - 2         [facility] is not updated on all  glaucoma     Pt currently taking Antiplatelet therapy? no    Advance Directive:  1. Do you have an advance directive in place? Patient Reply:yes        Coordination of Care:  1. Have you been to the ER, urgent care clinic since your last visit? Hospitalized since your last visit? no    2. Have you seen or consulted any other health care providers outside of the [address] [place] [address] since your last visit? Include any pap smears or colon screening.  no

## 2018-05-11 ENCOUNTER — TELEPHONE (OUTPATIENT)
Dept: INTERNAL MEDICINE CLINIC | Age: 83
End: 2018-05-11

## 2018-05-12 NOTE — PROGRESS NOTES
The patient continues with GI symptoms - improved with Bentyl but the problem continues. The patient continues with aching in her muscles. The patient remains on medications for hypertension and hyperlipidemia. She is tolerating the medications well. I discussed the situation with the patient. I will refer to GI. Labs ordered. A total of 10 minutes was spent with the patient. Greater than 50% of this time was spent in discussion of the problem, counseling the patient, and coordinating the care regarding the problem of abdominal pain and PMR with hypertension and hyperlipidemia.

## 2018-05-17 NOTE — TELEPHONE ENCOUNTER
Requested Prescriptions     Pending Prescriptions Disp Refills    furosemide (LASIX) 20 mg tablet 135 Tab      Si tablet each AM  Indications: patient states she is taking 1 and 1/2 tablet

## 2018-05-18 RX ORDER — FUROSEMIDE 20 MG/1
TABLET ORAL
Qty: 135 TAB | Refills: 3 | Status: SHIPPED | OUTPATIENT
Start: 2018-05-18

## 2018-05-21 NOTE — TELEPHONE ENCOUNTER
Status check made regarding whether she had gotten lab results from dr Byron Fothergill she would also like labs mailed to her from 5/09/2018

## 2018-05-23 NOTE — TELEPHONE ENCOUNTER
[address] [place] [address] and informed Her of lab results per Dr Keren Nino request. Anthony Chapman expressed understanding.  Sed rate elevated and if she was still having achiness then he would treat her, she scheduled an appointment to discuss for next week

## 2018-05-25 NOTE — TELEPHONE ENCOUNTER
Dr. Link Ledesma has a copy of labs and will call patient with results.
Patient called Wanting her lab results please call her at [phone]
gait, locomotion, and balance/aerobic capacity/endurance/dizziness

## 2018-05-30 ENCOUNTER — OFFICE VISIT (OUTPATIENT)
Dept: INTERNAL MEDICINE CLINIC | Age: 83
End: 2018-05-30

## 2018-05-30 VITALS
HEART RATE: 62 BPM | RESPIRATION RATE: 18 BRPM | OXYGEN SATURATION: 97 % | HEIGHT: 60 IN | WEIGHT: 143 LBS | SYSTOLIC BLOOD PRESSURE: 128 MMHG | BODY MASS INDEX: 28.07 KG/M2 | DIASTOLIC BLOOD PRESSURE: 68 MMHG | TEMPERATURE: 97.9 F

## 2018-05-30 DIAGNOSIS — I10 ESSENTIAL HYPERTENSION: ICD-10-CM

## 2018-05-30 DIAGNOSIS — J84.10 PULMONARY FIBROSIS (HCC): ICD-10-CM

## 2018-05-30 DIAGNOSIS — K58.8 OTHER IRRITABLE BOWEL SYNDROME: Primary | ICD-10-CM

## 2018-05-30 RX ORDER — DICYCLOMINE HYDROCHLORIDE 10 MG/1
CAPSULE ORAL
Qty: 90 CAP | Refills: 6 | Status: SHIPPED | OUTPATIENT
Start: 2018-05-30

## 2018-05-30 NOTE — PROGRESS NOTES
1. Have you been to the ER, urgent care clinic since your last visit? Hospitalized since your last visit? No    2. Have you seen or consulted any other health care providers outside of the [address] [place] [address] since your last visit? Include any pap smears or colon screening.  No

## 2018-05-31 NOTE — PROGRESS NOTES
The patient presents to the office today with the chief complaint of abdominal cramps    HPI    The patient has abdominal cramps which has been doing much better on Bentyl. The patient has pulmonary fibrosis. Her breathing is doing ok. It appears that the disease is stable. The patient remains on medications for hypertension. The patient is tolerating the medications well. Review of Systems   Respiratory: Positive for shortness of breath (Mild dyspnea). Cardiovascular: Negative for chest pain and leg swelling. Allergies   Allergen Reactions    Adhesive Tape-Silicones Rash    Codeine Hives    Demerol [Meperidine] Nausea Only    Levaquin [Levofloxacin] Other (comments)     Swelling in feet and legs       Current Outpatient Prescriptions   Medication Sig Dispense Refill    dicyclomine (BENTYL) 10 mg capsule 1 three times per day (with each meal) 90 Cap 6    furosemide (LASIX) 20 mg tablet 1 tablet each AM  Indications: patient states she is taking 1 and 1/2 tablet 135 Tab 3    predniSONE (DELTASONE) 2.5 mg tablet Take  by mouth.  guanFACINE IR (TENEX) 1 mg IR tablet TAKE ONE-HALF TABLET BY  MOUTH EVERY DAY 45 Tab 3    triamcinolone acetonide (KENALOG) 0.1 % topical cream Apply daily 45 g 1    CALCIUM CITRATE/VITAMIN D3 (CITRACAL + D MAXIMUM PO) Take 2 Tabs by mouth daily.  folic acid 046 mcg tablet Take 800 mcg by mouth daily.  losartan (COZAAR) 100 mg tablet Take 1 Tab by mouth daily. 90 Tab 3    apixaban (ELIQUIS) 2.5 mg tablet Take 1 Tab by mouth two (2) times a day. 60 Tab 0    simvastatin (ZOCOR) 20 mg tablet Take 1 Tab by mouth nightly. 90 Tab 3    raNITIdine (ZANTAC) 300 mg tablet Take 1 Tab by mouth daily. 90 Tab 3    doxepin (SINEQUAN) 25 mg capsule Take 1 Cap by mouth nightly. 90 Cap 3    metoprolol tartrate (LOPRESSOR) 50 mg tablet Take 1 Tab by mouth two (2) times a day. 180 Tab 3    levothyroxine (SYNTHROID) 100 mcg tablet Take 1 Tab by mouth nightly.  80 Tab 3    peg 400-propylene glycol (SYSTANE, PROPYLENE GLYCOL,) 0.4-0.3 % drop Administer 1 Drop to both eyes as needed.  GLUCOSAMINE HCL/CHONDR LEGER A NA (OSTEO BI-FLEX PO) Take 2 Tabs by mouth daily.  cyanocobalamin 1,000 mcg tablet Take 1,000 mcg by mouth daily.  multivitamins-minerals-lutein (CENTRUM SILVER) tab tablet Take 1 Tab by mouth daily.  loratadine (CLARITIN) 10 mg tablet Take 10 mg by mouth daily.  tamoxifen (NOLVADEX) 20 mg tablet Take 20 mg by mouth daily.  clobetasol (OLUX) 0.05 % topical foam Apply  to affected area two (2) times daily as needed for Skin Irritation. use thin film on affected area      predniSONE (DELTASONE) 5 mg tablet Take 2 tablets (10 mg) by mouth once daily. Decrease to 1 1/2 tablets (7.5 mg) by mouth once daily on October 15, 2017. On 11/7/2017, dose will be further reduced to 1 tablet (5 mg) by mouth once daily as directed by Dr. Kita Pillai.          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 Guerita Hinton of breast, right 1976    benign    Mass of breast, right 1994    benign    Pure hyperglyceridemia     Thromboembolus ([address] 09/2017    blood clot right leg    Thyroid disease        Past Surgical History:   Procedure Laterality Date    HX APPENDECTOMY      HX CATARACT REMOVAL Bilateral 2010    HX CHOLECYSTECTOMY      HX HEMORRHOIDECTOMY      HX HERNIA REPAIR  2004    small abdominal repair and fistula    HX HYSTERECTOMY  1991    cystocele    HX LYMPHADENECTOMY Right 7/2001    right axilla    HX MASTECTOMY Right 2001    with reconstruction    HX MASTECTOMY Left 1/14    HX POLYPECTOMY      [id number] muliple removals    HX RECTOCELE REPAIR  2003       Social History     Social History    Marital status:      Spouse name: N/A    Number of children: N/A    Years of education: N/A     Occupational History    Not on file. Social History Main Topics    Smoking status: Former Smoker     Packs/day: 2.00     Years: 9.00     Quit date: 1/1/1963    Smokeless tobacco: Never Used    Alcohol use No      Comment: rare    Drug use: No    Sexual activity: Not Currently     Other Topics Concern    Not on file     Social History Narrative       Patient does have an advanced directive on file    Visit Vitals    /68 (BP 1 Location: Left arm, BP Patient Position: Sitting)    Pulse 62    Temp 97.9 °F (36.6 °C) (Tympanic)    Resp 18    Ht 5' (1.524 m)    Wt 143 lb (64.9 kg)    SpO2 97%    BMI 27.93 kg/m2       Physical Exam   Cardiovascular: Exam reveals no gallop. No murmur heard. Pulmonary/Chest: She has no wheezes. She has rales (Few dry rales). Abdominal: Soft. She exhibits no distension. There is no tenderness. BMI:  [facility] Outpatient Visit on 05/09/2018   Component Date Value Ref Range Status    WBC 05/09/2018 7.7  4.6 - 13.2 K/uL Final    RBC 05/09/2018 3.51* 4.20 - 5.30 M/uL Final    HGB 05/09/2018 11.0* 12.0 - 16.0 g/dL Final    HCT 05/09/2018 34.7* 35.0 - 45.0 % Final    MCV 05/09/2018 98.9* 74.0 - 97.0 FL Final    MCH 05/09/2018 31.3  24.0 - 34.0 PG Final    MCHC 05/09/2018 31.7  31.0 - 37.0 g/dL Final    RDW 05/09/2018 14.1  11.6 - 14.5 % Final    PLATELET 61/49/8797 767  135 - 420 K/uL Final    MPV 05/09/2018 9.3  9.2 - 11.8 FL Final    NEUTROPHILS 05/09/2018 64  40 - 73 % Final    LYMPHOCYTES 05/09/2018 18* 21 - 52 % Final    MONOCYTES 05/09/2018 11* 3 - 10 % Final    EOSINOPHILS 05/09/2018 6* 0 - 5 % Final    BASOPHILS 05/09/2018 1  0 - 2 % Final    ABS. NEUTROPHILS 05/09/2018 5.0  1.8 - 8.0 K/UL Final    ABS.  LYMPHOCYTES 05/09/2018 1.4  0.9 - 3.6 K/UL Final    ABS. MONOCYTES 05/09/2018 0.8  0.05 - 1.2 K/UL Final    ABS. EOSINOPHILS 05/09/2018 0.5* 0.0 - 0.4 K/UL Final    ABS. BASOPHILS 05/09/2018 0.0  0.0 - 0.06 K/UL Final    DF 05/09/2018 AUTOMATED    Final    Sodium 05/09/2018 137  136 - 145 mmol/L Final    Potassium 05/09/2018 3.7  3.5 - 5.5 mmol/L Final    Chloride 05/09/2018 101  100 - 108 mmol/L Final    CO2 05/09/2018 29  21 - 32 mmol/L Final    Anion gap 05/09/2018 7  3.0 - 18 mmol/L Final    Glucose 05/09/2018 98  74 - 99 mg/dL Final    BUN 05/09/2018 33* 7.0 - 18 MG/DL Final    Creatinine 05/09/2018 1.45* 0.6 - 1.3 MG/DL Final    BUN/Creatinine ratio 05/09/2018 23* 12 - 20   Final    GFR est AA 05/09/2018 42* >60 ml/min/1.73m2 Final    GFR est non-AA 05/09/2018 34* >60 ml/min/1.73m2 Final    Comment: (NOTE)  Estimated GFR is calculated using the Modification of Diet in Renal   Disease (MDRD) Study equation, reported for both  Americans   (GFRAA) and non- Americans (GFRNA), and normalized to 1.73m2   body surface area. The physician must decide which value applies to   the patient. The MDRD study equation should only be used in   individuals age 25 or older. It has not been validated for the   following: pregnant women, patients with serious comorbid conditions,   or on certain medications, or persons with extremes of body size,   muscle mass, or nutritional status.  Calcium 05/09/2018 9.3  8.5 - 10.1 MG/DL Final    Bilirubin, total 05/09/2018 0.4  0.2 - 1.0 MG/DL Final    ALT (SGPT) 05/09/2018 42  13 - 56 U/L Final    AST (SGOT) 05/09/2018 25  15 - 37 U/L Final    Alk.  phosphatase 05/09/2018 62  45 - 117 U/L Final    Protein, total 05/09/2018 7.0  6.4 - 8.2 g/dL Final    Albumin 05/09/2018 3.6  3.4 - 5.0 g/dL Final    Globulin 05/09/2018 3.4  2.0 - 4.0 g/dL Final    A-G Ratio 05/09/2018 1.1  0.8 - 1.7   Final    TSH 05/09/2018 1.15  0.36 - 3.74 uIU/mL Final    LIPID PROFILE 05/09/2018        Final    Cholesterol, total 05/09/2018 151  <200 MG/DL Final    Triglyceride 05/09/2018 155* <150 MG/DL Final    Comment: The drugs N-acetylcysteine (NAC) and  Metamiszole have been found to cause falsely  low results in this chemical assay. Please  be sure to submit blood samples obtained  BEFORE administration of either of these  drugs to assure correct results.  HDL Cholesterol 05/09/2018 68* 40 - 60 MG/DL Final    LDL, calculated 05/09/2018 52  0 - 100 MG/DL Final    VLDL, calculated 05/09/2018 31  MG/DL Final    CHOL/HDL Ratio 05/09/2018 2.2  0 - 5.0   Final    C-Reactive protein 05/09/2018 <0.3  0 - 0.3 mg/dL Final    Sed rate, automated 05/09/2018 60* 0 - 30 mm/hr Final   Hospital Outpatient Visit on 03/15/2018   Component Date Value Ref Range Status    Sodium 03/15/2018 139  136 - 145 mmol/L Final    Potassium 03/15/2018 4.0  3.5 - 5.5 mmol/L Final    Chloride 03/15/2018 103  100 - 108 mmol/L Final    CO2 03/15/2018 30  21 - 32 mmol/L Final    Anion gap 03/15/2018 6  3.0 - 18 mmol/L Final    Glucose 03/15/2018 106* 74 - 99 mg/dL Final    BUN 03/15/2018 32* 7.0 - 18 MG/DL Final    Creatinine 03/15/2018 1.56* 0.6 - 1.3 MG/DL Final    BUN/Creatinine ratio 03/15/2018 21* 12 - 20   Final    GFR est AA 03/15/2018 38* >60 ml/min/1.73m2 Final    GFR est non-AA 03/15/2018 32* >60 ml/min/1.73m2 Final    Comment: (NOTE)  Estimated GFR is calculated using the Modification of Diet in Renal   Disease (MDRD) Study equation, reported for both  Americans   (GFRAA) and non- Americans (GFRNA), and normalized to 1.73m2   body surface area. The physician must decide which value applies to   the patient. The MDRD study equation should only be used in   individuals age 25 or older.  It has not been validated for the   following: pregnant women, patients with serious comorbid conditions,   or on certain medications, or persons with extremes of body size,   muscle mass, or nutritional status.  Calcium 03/15/2018 8.9  8.5 - 10.1 MG/DL Final    Bilirubin, total 03/15/2018 0.4  0.2 - 1.0 MG/DL Final    ALT (SGPT) 03/15/2018 124* 13 - 56 U/L Final    AST (SGOT) 03/15/2018 63* 15 - 37 U/L Final    Alk. phosphatase 03/15/2018 84  45 - 117 U/L Final    Protein, total 03/15/2018 7.0  6.4 - 8.2 g/dL Final    Albumin 03/15/2018 3.5  3.4 - 5.0 g/dL Final    Globulin 03/15/2018 3.5  2.0 - 4.0 g/dL Final    A-G Ratio 03/15/2018 1.0  0.8 - 1.7   Final       .No results found for any visits on 05/30/18. Assessment / Plan      ICD-10-CM ICD-9-CM    1. Other irritable bowel syndrome K58.8 564.1    2. Pulmonary fibrosis ([facility] J84.10 515    3. Essential hypertension I10 401.9        she was advised to continue her maintenance medications      Follow-up Disposition:  Return in about 4 months (around 9/30/2018). I asked Juan Pablo Schaefer if she has any questions and I answered the questions. Jeannie Schaefer states that she understands the treatment plan and agrees with the treatment plan

## 2018-07-05 DIAGNOSIS — I87.2 SAPHENOFEMORAL VENOUS REFLUX: ICD-10-CM

## 2018-07-05 DIAGNOSIS — R60.0 LEG EDEMA, RIGHT: ICD-10-CM

## 2018-07-05 RX ORDER — APIXABAN 2.5 MG/1
TABLET, FILM COATED ORAL
Qty: 180 TAB | Refills: 3 | Status: SHIPPED | OUTPATIENT
Start: 2018-07-05

## 2018-07-05 RX ORDER — LOSARTAN POTASSIUM 100 MG/1
TABLET ORAL
Qty: 90 TAB | Refills: 2 | Status: SHIPPED | OUTPATIENT
Start: 2018-07-05

## 2018-09-18 RX ORDER — METOPROLOL TARTRATE 50 MG/1
TABLET ORAL
Qty: 180 TAB | Refills: 3 | Status: SHIPPED | OUTPATIENT
Start: 2018-09-18 | End: 2019-01-03

## 2018-10-19 ENCOUNTER — TELEPHONE (OUTPATIENT)
Dept: INTERNAL MEDICINE CLINIC | Age: 83
End: 2018-10-19

## 2018-10-19 NOTE — TELEPHONE ENCOUNTER
per Dr Echo Maldonado patient was called  and advised to come in for a office visit today, patient stated she has an appointment on 10/25/2018 and would come then, this nurse then advised her if she needed to, to utilize the emergency room if she didn't want to come for a visit today   Patient verbalized understanding

## 2018-10-19 NOTE — TELEPHONE ENCOUNTER
Patient called and stated that she has not been feeling well since October 5th. She is very weak and does not have the energy to bathe or brush her hair. She is having trouble breathing. She saw pulmonology doctor yesterday and he gave her inhaler. She had a fall in the yard this week. She went to see Dr Jazmine Rodriguez  and he stated that she is very anemic and needed to see Dr Summer Teran. She advised she can not get into office today. I advised her if she felt worse she needed to go to ER. She wants to know if Dr Summer Teran can order home health for her. She has appointment with Dr Summer Teran next week. Please advise at [phone].

## 2018-10-25 ENCOUNTER — APPOINTMENT (OUTPATIENT)
Dept: GENERAL RADIOLOGY | Age: 83
DRG: 197 | End: 2018-10-25
Attending: EMERGENCY MEDICINE
Payer: MEDICARE

## 2018-10-25 ENCOUNTER — HOSPITAL ENCOUNTER (INPATIENT)
Age: 83
LOS: 4 days | Discharge: NURSING, REHAB OR LONG-TERM CARE | DRG: 197 | End: 2018-10-29
Attending: EMERGENCY MEDICINE | Admitting: HOSPITALIST
Payer: MEDICARE

## 2018-10-25 ENCOUNTER — APPOINTMENT (OUTPATIENT)
Dept: CT IMAGING | Age: 83
DRG: 197 | End: 2018-10-25
Attending: EMERGENCY MEDICINE
Payer: MEDICARE

## 2018-10-25 ENCOUNTER — OFFICE VISIT (OUTPATIENT)
Dept: INTERNAL MEDICINE CLINIC | Age: 83
End: 2018-10-25

## 2018-10-25 DIAGNOSIS — R09.02 HYPOXIA: ICD-10-CM

## 2018-10-25 DIAGNOSIS — R53.1 WEAKNESS GENERALIZED: Primary | ICD-10-CM

## 2018-10-25 DIAGNOSIS — J84.10 PULMONARY FIBROSIS (HCC): ICD-10-CM

## 2018-10-25 DIAGNOSIS — J84.10 PULMONARY FIBROSIS (HCC): Primary | ICD-10-CM

## 2018-10-25 DIAGNOSIS — E87.79 OTHER HYPERVOLEMIA: ICD-10-CM

## 2018-10-25 DIAGNOSIS — Z23 ENCOUNTER FOR IMMUNIZATION: ICD-10-CM

## 2018-10-25 DIAGNOSIS — E87.1 HYPONATREMIA: ICD-10-CM

## 2018-10-25 PROBLEM — E87.70 FLUID OVERLOAD: Status: ACTIVE | Noted: 2018-10-25

## 2018-10-25 LAB
ALBUMIN SERPL-MCNC: 2.3 G/DL (ref 3.4–5)
ALBUMIN/GLOB SERPL: 0.6 {RATIO} (ref 0.8–1.7)
ALP SERPL-CCNC: 46 U/L (ref 45–117)
ALT SERPL-CCNC: 156 U/L (ref 13–56)
AMPHET UR QL SCN: NEGATIVE
ANION GAP SERPL CALC-SCNC: 11 MMOL/L (ref 3–18)
APAP SERPL-MCNC: <2 UG/ML (ref 10–30)
APPEARANCE UR: CLEAR
AST SERPL-CCNC: 109 U/L (ref 15–37)
ATRIAL RATE: 90 BPM
BACTERIA URNS QL MICRO: ABNORMAL /HPF
BARBITURATES UR QL SCN: NEGATIVE
BASOPHILS # BLD: 0 K/UL (ref 0–0.06)
BASOPHILS NFR BLD: 0 % (ref 0–3)
BENZODIAZ UR QL: NEGATIVE
BILIRUB SERPL-MCNC: 0.4 MG/DL (ref 0.2–1)
BILIRUB UR QL: NEGATIVE
BNP SERPL-MCNC: 2710 PG/ML (ref 0–1800)
BUN SERPL-MCNC: 46 MG/DL (ref 7–18)
BUN/CREAT SERPL: 25 (ref 12–20)
CALCIUM SERPL-MCNC: 8 MG/DL (ref 8.5–10.1)
CALCULATED P AXIS, ECG09: 20 DEGREES
CALCULATED R AXIS, ECG10: -44 DEGREES
CALCULATED T AXIS, ECG11: 4 DEGREES
CANNABINOIDS UR QL SCN: NEGATIVE
CHLORIDE SERPL-SCNC: 99 MMOL/L (ref 100–108)
CO2 SERPL-SCNC: 20 MMOL/L (ref 21–32)
COCAINE UR QL SCN: NEGATIVE
COLOR UR: YELLOW
CREAT SERPL-MCNC: 1.82 MG/DL (ref 0.6–1.3)
DIAGNOSIS, 93000: NORMAL
DIFFERENTIAL METHOD BLD: ABNORMAL
EOSINOPHIL # BLD: 0 K/UL (ref 0–0.4)
EOSINOPHIL NFR BLD: 0 % (ref 0–5)
EPITH CASTS URNS QL MICRO: ABNORMAL /LPF (ref 0–5)
ERYTHROCYTE [DISTWIDTH] IN BLOOD BY AUTOMATED COUNT: 13.9 % (ref 11.6–14.5)
ETHANOL SERPL-MCNC: <3 MG/DL (ref 0–3)
GLOBULIN SER CALC-MCNC: 4.1 G/DL (ref 2–4)
GLUCOSE SERPL-MCNC: 119 MG/DL (ref 74–99)
GLUCOSE UR STRIP.AUTO-MCNC: NEGATIVE MG/DL
HCT VFR BLD AUTO: 28.3 % (ref 35–45)
HDSCOM,HDSCOM: NORMAL
HGB BLD-MCNC: 9.8 G/DL (ref 12–16)
HGB UR QL STRIP: NEGATIVE
HYALINE CASTS URNS QL MICRO: ABNORMAL /LPF (ref 0–2)
KETONES UR QL STRIP.AUTO: NEGATIVE MG/DL
LEUKOCYTE ESTERASE UR QL STRIP.AUTO: ABNORMAL
LIPASE SERPL-CCNC: 62 U/L (ref 73–393)
LYMPHOCYTES # BLD: 0.5 K/UL (ref 0.8–3.5)
LYMPHOCYTES NFR BLD: 8 % (ref 20–51)
MAGNESIUM SERPL-MCNC: 1.5 MG/DL (ref 1.6–2.6)
MCH RBC QN AUTO: 30.2 PG (ref 24–34)
MCHC RBC AUTO-ENTMCNC: 34.6 G/DL (ref 31–37)
MCV RBC AUTO: 87.1 FL (ref 74–97)
METAMYELOCYTES NFR BLD MANUAL: 1 %
METHADONE UR QL: NEGATIVE
MONOCYTES # BLD: 0.4 K/UL (ref 0–1)
MONOCYTES NFR BLD: 6 % (ref 2–9)
NEUTS BAND NFR BLD MANUAL: 28 % (ref 0–5)
NEUTS SEG # BLD: 5.5 K/UL (ref 1.8–8)
NEUTS SEG NFR BLD: 57 % (ref 42–75)
NITRITE UR QL STRIP.AUTO: NEGATIVE
OPIATES UR QL: NEGATIVE
P-R INTERVAL, ECG05: 172 MS
PCP UR QL: NEGATIVE
PH UR STRIP: 5 [PH] (ref 5–8)
PLATELET # BLD AUTO: 403 K/UL (ref 135–420)
PLATELET COMMENTS,PCOM: ABNORMAL
PMV BLD AUTO: 8.7 FL (ref 9.2–11.8)
POTASSIUM SERPL-SCNC: 4 MMOL/L (ref 3.5–5.5)
PROT SERPL-MCNC: 6.4 G/DL (ref 6.4–8.2)
PROT UR STRIP-MCNC: NEGATIVE MG/DL
Q-T INTERVAL, ECG07: 350 MS
QRS DURATION, ECG06: 90 MS
QTC CALCULATION (BEZET), ECG08: 428 MS
RBC # BLD AUTO: 3.25 M/UL (ref 4.2–5.3)
RBC #/AREA URNS HPF: ABNORMAL /HPF (ref 0–5)
RBC MORPH BLD: ABNORMAL
SALICYLATES SERPL-MCNC: <2.8 MG/DL (ref 2.8–20)
SODIUM SERPL-SCNC: 130 MMOL/L (ref 136–145)
SP GR UR REFRACTOMETRY: 1.01 (ref 1–1.03)
UROBILINOGEN UR QL STRIP.AUTO: 0.2 EU/DL (ref 0.2–1)
VENTRICULAR RATE, ECG03: 90 BPM
WBC # BLD AUTO: 6.5 K/UL (ref 4.6–13.2)
WBC URNS QL MICRO: ABNORMAL /HPF (ref 0–4)

## 2018-10-25 PROCEDURE — 80053 COMPREHEN METABOLIC PANEL: CPT | Performed by: EMERGENCY MEDICINE

## 2018-10-25 PROCEDURE — 74011250637 HC RX REV CODE- 250/637: Performed by: HOSPITALIST

## 2018-10-25 PROCEDURE — 81001 URINALYSIS AUTO W/SCOPE: CPT | Performed by: EMERGENCY MEDICINE

## 2018-10-25 PROCEDURE — 71046 X-RAY EXAM CHEST 2 VIEWS: CPT

## 2018-10-25 PROCEDURE — 65660000000 HC RM CCU STEPDOWN

## 2018-10-25 PROCEDURE — C9113 INJ PANTOPRAZOLE SODIUM, VIA: HCPCS | Performed by: HOSPITALIST

## 2018-10-25 PROCEDURE — 85025 COMPLETE CBC W/AUTO DIFF WBC: CPT | Performed by: EMERGENCY MEDICINE

## 2018-10-25 PROCEDURE — 87070 CULTURE OTHR SPECIMN AEROBIC: CPT | Performed by: HOSPITALIST

## 2018-10-25 PROCEDURE — 93005 ELECTROCARDIOGRAM TRACING: CPT

## 2018-10-25 PROCEDURE — 83880 ASSAY OF NATRIURETIC PEPTIDE: CPT | Performed by: EMERGENCY MEDICINE

## 2018-10-25 PROCEDURE — 83690 ASSAY OF LIPASE: CPT | Performed by: EMERGENCY MEDICINE

## 2018-10-25 PROCEDURE — 74011250636 HC RX REV CODE- 250/636: Performed by: EMERGENCY MEDICINE

## 2018-10-25 PROCEDURE — 74011000250 HC RX REV CODE- 250: Performed by: HOSPITALIST

## 2018-10-25 PROCEDURE — 80307 DRUG TEST PRSMV CHEM ANLYZR: CPT | Performed by: EMERGENCY MEDICINE

## 2018-10-25 PROCEDURE — 83735 ASSAY OF MAGNESIUM: CPT | Performed by: EMERGENCY MEDICINE

## 2018-10-25 PROCEDURE — 99285 EMERGENCY DEPT VISIT HI MDM: CPT

## 2018-10-25 PROCEDURE — 74011250636 HC RX REV CODE- 250/636: Performed by: HOSPITALIST

## 2018-10-25 PROCEDURE — 70450 CT HEAD/BRAIN W/O DYE: CPT

## 2018-10-25 RX ORDER — FUROSEMIDE 10 MG/ML
40 INJECTION INTRAMUSCULAR; INTRAVENOUS
Status: COMPLETED | OUTPATIENT
Start: 2018-10-25 | End: 2018-10-25

## 2018-10-25 RX ORDER — ONDANSETRON 2 MG/ML
4 INJECTION INTRAMUSCULAR; INTRAVENOUS
Status: DISCONTINUED | OUTPATIENT
Start: 2018-10-25 | End: 2018-10-29 | Stop reason: HOSPADM

## 2018-10-25 RX ORDER — LEVOTHYROXINE SODIUM 100 UG/1
100 TABLET ORAL
Status: DISCONTINUED | OUTPATIENT
Start: 2018-10-25 | End: 2018-10-29 | Stop reason: HOSPADM

## 2018-10-25 RX ORDER — ACETAMINOPHEN 325 MG/1
650 TABLET ORAL
Status: DISCONTINUED | OUTPATIENT
Start: 2018-10-25 | End: 2018-10-29 | Stop reason: HOSPADM

## 2018-10-25 RX ORDER — FOLIC ACID 1 MG/1
800 TABLET ORAL DAILY
Status: DISCONTINUED | OUTPATIENT
Start: 2018-10-26 | End: 2018-10-29 | Stop reason: HOSPADM

## 2018-10-25 RX ORDER — METOPROLOL TARTRATE 50 MG/1
50 TABLET ORAL 2 TIMES DAILY
Status: DISCONTINUED | OUTPATIENT
Start: 2018-10-26 | End: 2018-10-29 | Stop reason: HOSPADM

## 2018-10-25 RX ORDER — NALOXONE HYDROCHLORIDE 0.4 MG/ML
0.4 INJECTION, SOLUTION INTRAMUSCULAR; INTRAVENOUS; SUBCUTANEOUS AS NEEDED
Status: DISCONTINUED | OUTPATIENT
Start: 2018-10-25 | End: 2018-10-29 | Stop reason: HOSPADM

## 2018-10-25 RX ORDER — METOPROLOL TARTRATE 50 MG/1
50 TABLET ORAL
Status: COMPLETED | OUTPATIENT
Start: 2018-10-25 | End: 2018-10-25

## 2018-10-25 RX ORDER — LOSARTAN POTASSIUM 50 MG/1
50 TABLET ORAL DAILY
Status: DISCONTINUED | OUTPATIENT
Start: 2018-10-26 | End: 2018-10-29

## 2018-10-25 RX ORDER — FUROSEMIDE 20 MG/1
20 TABLET ORAL DAILY
Status: DISCONTINUED | OUTPATIENT
Start: 2018-10-26 | End: 2018-10-26

## 2018-10-25 RX ADMIN — METOPROLOL TARTRATE 50 MG: 50 TABLET ORAL at 22:30

## 2018-10-25 RX ADMIN — LEVOTHYROXINE SODIUM 100 MCG: 100 TABLET ORAL at 22:29

## 2018-10-25 RX ADMIN — FUROSEMIDE 40 MG: 10 INJECTION, SOLUTION INTRAMUSCULAR; INTRAVENOUS at 17:04

## 2018-10-25 RX ADMIN — SODIUM CHLORIDE 40 MG: 9 INJECTION INTRAMUSCULAR; INTRAVENOUS; SUBCUTANEOUS at 22:30

## 2018-10-25 RX ADMIN — METHYLPREDNISOLONE SODIUM SUCCINATE 40 MG: 40 INJECTION, POWDER, FOR SOLUTION INTRAMUSCULAR; INTRAVENOUS at 23:35

## 2018-10-25 NOTE — ROUTINE PROCESS
Bedside shift change report given to Jose Tirado (oncoming nurse) by Stephanie Barillas (offgoing nurse). Report included the following information SBAR, Kardex and Recent Results.

## 2018-10-25 NOTE — ED PROVIDER NOTES
EMERGENCY DEPARTMENT HISTORY AND PHYSICAL EXAM 
 
1:08 PM 
 
 
Date: 10/25/2018 Patient Name: Xavi Alanis History of Presenting Illness Chief Complaint Patient presents with  Shortness of Breath History Provided By: Patient 1:08 PM Xavi Alanis is a 80 y.o. female with h/o COPD, HTN, breast cancer x2 who presents to ED complaining of progressive, gradual, moderate, SOB onset 3 weeks ago. Associated sx include fatigue, leg swelling. diarrhea, and blood in stool. Patient was sent to ED by PCP because he believes she is not doing well at home. PCP states patient is having a hard time breathing. Systolic pressure is 88. Also states patient is confused and believes she needs admission. Admits she is unable to care for herself at home and denies having home health. Denies CP. Admits to smoking for 9 years, years ago. Patient currently taking Eliquis. 2 days ago, patient also admits to falling. Denies hitting head and LOC. Denies any further complaints or symptoms at the moment. PCP: Michail Goltz, MD 
 
 
 
 
 
 
 
Past History Past Medical History: 
Past Medical History:  
Diagnosis Date  Autoimmune disease ([address]  Breast cancer ([address] 2001, 1/ 2014  
 right, left  Cancer ([address]  Colon polyps  Elevated cholesterol  Enlarged lymph nodes   
 pericardium  GERD (gastroesophageal reflux disease)  Hypertension  Ill-defined condition 2007 to 2010  
 lung-ground glass followed by Dr. Peters Schooling  Insomnia  Lichen plano-pilaris  Mass of breast, right 1976  
 benign  Mass of breast, right 1994  
 benign  Pure hyperglyceridemia  Thromboembolus ([address] 09/2017  
 blood clot right leg  Thyroid disease Past Surgical History: 
Past Surgical History:  
Procedure Laterality Date  HX APPENDECTOMY  HX CATARACT REMOVAL Bilateral 2010  HX CHOLECYSTECTOMY  HX HEMORRHOIDECTOMY  HX HERNIA REPAIR  2004 small abdominal repair and fistula  HX HYSTERECTOMY    
 cystocele  HX LYMPHADENECTOMY Right 2001  
 right axilla  HX MASTECTOMY Right   
 with reconstruction  HX MASTECTOMY Left   HX POLYPECTOMY    
 [id number] muliple removals  HX RECTOCELE REPAIR   Family History: 
Family History Problem Relation Age of Onset  Hypertension Mother  COPD Mother  Heart Disease Father Social History: 
Social History Tobacco Use  Smoking status: Former Smoker Packs/day: 2.00 Years: 9.00 Pack years: 18.00 Last attempt to quit: 1963 Years since quittin.8  Smokeless tobacco: Never Used Substance Use Topics  Alcohol use: No  
  Alcohol/week: 0.5 oz Types: 1 Standard drinks or equivalent per week Comment: rare  Drug use: No  
 
 
Allergies: Allergies Allergen Reactions  Adhesive Tape-Silicones Rash  Codeine Hives  Demerol [Meperidine] Nausea Only  Levaquin [Levofloxacin] Other (comments) Swelling in feet and legs Review of Systems Review of Systems Constitutional: Positive for fatigue. Negative for chills and fever. HENT: Negative for dental problem. Respiratory: Positive for shortness of breath. Cardiovascular: Positive for leg swelling. Negative for chest pain. Gastrointestinal: Positive for blood in stool and diarrhea. Negative for abdominal pain, nausea and vomiting. Musculoskeletal: Negative for neck pain. Neurological: Negative for dizziness and weakness. Psychiatric/Behavioral: Negative for behavioral problems and hallucinations. The patient is not nervous/anxious. All other systems reviewed and are negative. Physical Exam  
 
Visit Vitals /46 Pulse 77 Temp 97.9 °F (36.6 °C) Resp 21 Wt 60.8 kg (134 lb) SpO2 95% BMI 26.17 kg/m² Physical Exam  
Constitutional: She is oriented to person, place, and time.  She appears well-developed and well-nourished. No distress. HENT:  
Head: Normocephalic and atraumatic. Mouth/Throat: Mucous membranes are not dry. Moist mucous membranes Eyes: Pupils are equal, round, and reactive to light. Neck: Normal range of motion. Neck supple. Cardiovascular: Normal rate, regular rhythm, normal heart sounds and intact distal pulses. Exam reveals no gallop and no friction rub. No murmur heard. 2+ radial pulses bilaterally. No BLE edema. 1/6 systolic murmur Pulmonary/Chest: Effort normal and breath sounds normal. No respiratory distress. She has no wheezes. She has no rales. Crackles in the bases of the lungs Abdominal: Soft. Bowel sounds are normal. She exhibits no distension. There is no tenderness. Musculoskeletal: Normal range of motion. Trace pedal edema bilaterally Lymphadenopathy:  
No lymphadenopathy. Neurological: She is alert and oriented to person, place, and time. No cranial nerve deficit. Skin: Skin is warm and dry. Nursing note and vitals reviewed. Diagnostic Study Results Labs - Recent Results (from the past 12 hour(s)) EKG, 12 LEAD, INITIAL Collection Time: 10/25/18  1:15 PM  
Result Value Ref Range Ventricular Rate 90 BPM  
 Atrial Rate 90 BPM  
 P-R Interval 172 ms QRS Duration 90 ms Q-T Interval 350 ms QTC Calculation (Bezet) 428 ms Calculated P Axis 20 degrees Calculated R Axis -44 degrees Calculated T Axis 4 degrees Diagnosis Normal sinus rhythm Left axis deviation RSR' or QR pattern in V1 suggests right ventricular conduction delay Voltage criteria for left ventricular hypertrophy Inferior infarct , age undetermined Anterolateral infarct (cited on or before 21-NOV-2017) Abnormal ECG Confirmed by Triston Eckert MD, Srinivas Narvaez ([id number]) on 10/25/2018 5:05:19 PM 
  
CBC WITH AUTOMATED DIFF Collection Time: 10/25/18  1:16 PM  
Result Value Ref Range WBC 6.5 4.6 - 13.2 K/uL  
 RBC 3.25 (L) 4.20 - 5.30 M/uL HGB 9.8 (L) 12.0 - 16.0 g/dL HCT 28.3 (L) 35.0 - 45.0 % MCV 87.1 74.0 - 97.0 FL  
 MCH 30.2 24.0 - 34.0 PG  
 MCHC 34.6 31.0 - 37.0 g/dL  
 RDW 13.9 11.6 - 14.5 % PLATELET 018 344 - 478 K/uL MPV 8.7 (L) 9.2 - 11.8 FL  
 NEUTROPHILS 57 42 - 75 % BAND NEUTROPHILS 28 (H) 0 - 5 % LYMPHOCYTES 8 (L) 20 - 51 % MONOCYTES 6 2 - 9 % EOSINOPHILS 0 0 - 5 % BASOPHILS 0 0 - 3 % METAMYELOCYTES 1 (H) 0 %  
 ABS. NEUTROPHILS 5.5 1.8 - 8.0 K/UL  
 ABS. LYMPHOCYTES 0.5 (L) 0.8 - 3.5 K/UL  
 ABS. MONOCYTES 0.4 0 - 1.0 K/UL  
 ABS. EOSINOPHILS 0.0 0.0 - 0.4 K/UL  
 ABS. BASOPHILS 0.0 0.0 - 0.06 K/UL  
 DF MANUAL PLATELET COMMENTS Increased Platelets RBC COMMENTS NORMOCYTIC, NORMOCHROMIC METABOLIC PANEL, COMPREHENSIVE Collection Time: 10/25/18  1:16 PM  
Result Value Ref Range Sodium 130 (L) 136 - 145 mmol/L Potassium 4.0 3.5 - 5.5 mmol/L Chloride 99 (L) 100 - 108 mmol/L  
 CO2 20 (L) 21 - 32 mmol/L Anion gap 11 3.0 - 18 mmol/L Glucose 119 (H) 74 - 99 mg/dL BUN 46 (H) 7.0 - 18 MG/DL Creatinine 1.82 (H) 0.6 - 1.3 MG/DL  
 BUN/Creatinine ratio 25 (H) 12 - 20 GFR est AA 32 (L) >60 ml/min/1.73m2 GFR est non-AA 26 (L) >60 ml/min/1.73m2 Calcium 8.0 (L) 8.5 - 10.1 MG/DL Bilirubin, total 0.4 0.2 - 1.0 MG/DL  
 ALT (SGPT) 156 (H) 13 - 56 U/L  
 AST (SGOT) 109 (H) 15 - 37 U/L Alk. phosphatase 46 45 - 117 U/L Protein, total 6.4 6.4 - 8.2 g/dL Albumin 2.3 (L) 3.4 - 5.0 g/dL Globulin 4.1 (H) 2.0 - 4.0 g/dL A-G Ratio 0.6 (L) 0.8 - 1.7 NT-PRO BNP Collection Time: 10/25/18  1:16 PM  
Result Value Ref Range NT pro-BNP 2,710 (H) 0 - 1,800 PG/ML  
LIPASE Collection Time: 10/25/18  1:16 PM  
Result Value Ref Range Lipase 62 (L) 73 - 393 U/L MAGNESIUM Collection Time: 10/25/18  1:16 PM  
Result Value Ref Range Magnesium 1.5 (L) 1.6 - 2.6 mg/dL ACETAMINOPHEN Collection Time: 10/25/18  1:16 PM  
Result Value Ref Range Acetaminophen level <2 (L) 10.0 - 30.0 ug/mL ETHYL ALCOHOL Collection Time: 10/25/18  1:16 PM  
Result Value Ref Range ALCOHOL(ETHYL),SERUM <3 0 - 3 MG/DL  
SALICYLATE Collection Time: 10/25/18  1:16 PM  
Result Value Ref Range Salicylate level <4.5 (L) 2.8 - 20.0 MG/DL URINALYSIS W/ RFLX MICROSCOPIC Collection Time: 10/25/18  4:54 PM  
Result Value Ref Range Color YELLOW Appearance CLEAR Specific gravity 1.014 1.005 - 1.030    
 pH (UA) 5.0 5.0 - 8.0 Protein NEGATIVE  NEG mg/dL Glucose NEGATIVE  NEG mg/dL Ketone NEGATIVE  NEG mg/dL Bilirubin NEGATIVE  NEG Blood NEGATIVE  NEG Urobilinogen 0.2 0.2 - 1.0 EU/dL Nitrites NEGATIVE  NEG Leukocyte Esterase SMALL (A) NEG    
DRUG SCREEN, URINE Collection Time: 10/25/18  4:54 PM  
Result Value Ref Range BENZODIAZEPINES NEGATIVE  NEG    
 BARBITURATES NEGATIVE  NEG    
 THC (TH-CANNABINOL) NEGATIVE  NEG    
 OPIATES NEGATIVE  NEG    
 PCP(PHENCYCLIDINE) NEGATIVE  NEG    
 COCAINE NEGATIVE  NEG    
 AMPHETAMINES NEGATIVE  NEG METHADONE NEGATIVE  NEG HDSCOM (NOTE) URINE MICROSCOPIC ONLY Collection Time: 10/25/18  4:54 PM  
Result Value Ref Range WBC 2 to 3 0 - 4 /hpf  
 RBC NONE 0 - 5 /hpf Epithelial cells 2+ 0 - 5 /lpf Bacteria FEW (A) NEG /hpf Hyaline cast 0 to 1 0 - 2 /lpf Radiologic Studies -  
XR CHEST PA LAT Final Result CT HEAD WO CONT Final Result Medical Decision Making I am the first provider for this patient. I reviewed the vital signs, available nursing notes, past medical history, past surgical history, family history and social history. Vital Signs-Reviewed the patient's vital signs. Records Reviewed: Nursing Notes ED Course: Progress Notes, Reevaluation, and Consults: 
 
1:21 PM 
Spoke with PCP Dr. Kaitlynn Painter. He states patient is having diarrhea and is not doing well at home. Patient is having a hard time breathing. Systolic pressure is 88. She is confused and sent her to ED, and believes patient needs admission. Consult:  Discussed care with Dr. Rohith Khan, Specialty: Hospitalist. Standard discussion; including history of patients chief complaint, available diagnostic results, and treatment course. Accepts patient admission 5:02 PM, 10/25/2018 Provider Notes (Medical Decision Making): MDM Number of Diagnoses or Management Options Hyponatremia:  
Hypoxia:  
Other hypervolemia:  
Pulmonary fibrosis [facility]):  
Diagnosis management comments: Diff dx: copd exac, pulm fibrosis exac, pna, anemia Pt with worsening weakness over past 3wks, will r/o infection, ICH as pt has fallen and is on eliquis, pna, she does appear mildly tachypneic, will r/o CHF since has crackles but suspect these are related to pulm fibrosis. May need hospitalization for failure to thrive. Give small fluid bolus 7:41 PM 
Pt has fluid overload, hypoxic on walk test to 84%, will give lasix, admit to hospitalist, Dr. Rohith Khan accepts Yessenia Jeffrey MD 
 
 
 
 
Diagnosis Clinical Impression: 1. Pulmonary fibrosis ([address] 2. Hypoxia 3. Other hypervolemia 4. Hyponatremia Disposition: Admit Follow-up Information None Medication List  
  
CONTINUE taking these medications CENTRUM SILVER Tab tablet Generic drug:  multivitamins-minerals-lutein CITRACAL + D MAXIMUM PO 
  
clobetasol 0.05 % topical foam 
Commonly known as:  OLUX 
  
cyanocobalamin 1,000 mcg tablet 
  
dicyclomine 10 mg capsule Commonly known as:  BENTYL 1 three times per day (with each meal) doxepin 25 mg capsule Commonly known as:  SINEquan Take 1 Cap by mouth nightly. ELIQUIS 2.5 mg tablet Generic drug:  apixaban TAKE 1 TABLET BY MOUTH TWO  TIMES DAILY 
  
folic acid 140 mcg tablet 
  
furosemide 20 mg tablet Commonly known as:  LASIX 
1 tablet each AM  Indications: patient states she is taking 1 and 1/2 tablet guanFACINE IR 1 mg IR tablet Commonly known as:  TENEX 
TAKE ONE-HALF TABLET BY  MOUTH EVERY DAY 
  
levothyroxine 100 mcg tablet Commonly known as:  SYNTHROID Take 1 Tab by mouth nightly. loratadine 10 mg tablet Commonly known as:  CLARITIN 
  
losartan 100 mg tablet Commonly known as:  COZAAR 
TAKE 1 TABLET BY MOUTH  DAILY 
  
metoprolol tartrate 50 mg tablet Commonly known as:  LOPRESSOR 
TAKE 1 TABLET BY MOUTH TWO  TIMES DAILY 
  
OSTEO BI-FLEX PO 
  
* predniSONE 5 mg tablet Commonly known as:  Luevenia Norbert * predniSONE 2.5 mg tablet Commonly known as:  DELTASONE 
  
raNITIdine 300 mg Tab Commonly known as:  ZANTAC Take 1 Tab by mouth daily. simvastatin 20 mg tablet Commonly known as:  ZOCOR Take 1 Tab by mouth nightly. SYSTANE (PROPYLENE GLYCOL) 0.4-0.3 % Drop Generic drug:  peg 400-propylene glycol 
  
tamoxifen 20 mg tablet Commonly known as:  NOLVADEX 
  
triamcinolone acetonide 0.1 % topical cream 
Commonly known as:  KENALOG Apply daily * This list has 2 medication(s) that are the same as other medications prescribed for you. Read the directions carefully, and ask your doctor or other care provider to review them with you.  
  
  
  
 
_______________________________ Attestations: 
Scribe Attestation Alfred Connell acting as a scribe for and in the presence of Johnathon Wan MD     
October 25, 2018 at 1:08 PM 
    
Provider Attestation:     
I personally performed the services described in the documentation, reviewed the documentation, as recorded by the scribe in my presence, and it accurately and completely records my words and actions. October 25, 2018 at 1:08 PM - Johnathon Wan MD   
_______________________________

## 2018-10-25 NOTE — ROUTINE PROCESS
TRANSFER - OUT REPORT: 
 
Verbal report given to Daniela Mera RN on Raúl Sifuentes  being transferred to [address] [place] [address] for routine progression of care Report consisted of patients Situation, Background, Assessment and  
Recommendations(SBAR). Information from the following report(s) ED Summary was reviewed with the receiving nurse. Lines:  
Peripheral IV 10/25/18 Left Antecubital (Active) Site Assessment Clean, dry, & intact 10/25/2018  1:20 PM  
Phlebitis Assessment 0 10/25/2018  1:20 PM  
Infiltration Assessment 0 10/25/2018  1:20 PM  
Dressing Status Clean, dry, & intact 10/25/2018  1:20 PM  
Dressing Type Tape;Transparent 10/25/2018  1:20 PM  
Hub Color/Line Status Pink;Flushed;Patent 10/25/2018  1:20 PM  
Action Taken Blood drawn 10/25/2018  1:20 PM  
  
 
Opportunity for questions and clarification was provided.

## 2018-10-25 NOTE — ED TRIAGE NOTES
Pt.'s stepson states \"Dr. Julian Garcia sent us over; she's been complaining of feeling tired and short of breath\". Reports symptoms began 3 weeks ago.

## 2018-10-26 VITALS
TEMPERATURE: 99.2 F | HEIGHT: 60 IN | BODY MASS INDEX: 27.93 KG/M2 | OXYGEN SATURATION: 96 % | HEART RATE: 84 BPM | SYSTOLIC BLOOD PRESSURE: 88 MMHG | DIASTOLIC BLOOD PRESSURE: 50 MMHG
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
## 2018-10-26 PROBLEM — Z85.3 HISTORY OF BREAST CANCER: Chronic | Status: ACTIVE | Noted: 2018-10-25

## 2018-10-26 LAB
ANION GAP SERPL CALC-SCNC: 10 MMOL/L (ref 3–18)
BUN SERPL-MCNC: 49 MG/DL (ref 7–18)
BUN/CREAT SERPL: 30 (ref 12–20)
CALCIUM SERPL-MCNC: 7.5 MG/DL (ref 8.5–10.1)
CHLORIDE SERPL-SCNC: 102 MMOL/L (ref 100–108)
CO2 SERPL-SCNC: 21 MMOL/L (ref 21–32)
CREAT SERPL-MCNC: 1.65 MG/DL (ref 0.6–1.3)
GLUCOSE SERPL-MCNC: 159 MG/DL (ref 74–99)
POTASSIUM SERPL-SCNC: 4.6 MMOL/L (ref 3.5–5.5)
SODIUM SERPL-SCNC: 133 MMOL/L (ref 136–145)

## 2018-10-26 PROCEDURE — 80048 BASIC METABOLIC PNL TOTAL CA: CPT | Performed by: HOSPITALIST

## 2018-10-26 PROCEDURE — 77010033678 HC OXYGEN DAILY

## 2018-10-26 PROCEDURE — 74011250636 HC RX REV CODE- 250/636: Performed by: HOSPITALIST

## 2018-10-26 PROCEDURE — 74011250637 HC RX REV CODE- 250/637: Performed by: HOSPITALIST

## 2018-10-26 PROCEDURE — 65660000000 HC RM CCU STEPDOWN

## 2018-10-26 PROCEDURE — C9113 INJ PANTOPRAZOLE SODIUM, VIA: HCPCS | Performed by: HOSPITALIST

## 2018-10-26 PROCEDURE — 74011000250 HC RX REV CODE- 250: Performed by: HOSPITALIST

## 2018-10-26 PROCEDURE — 36415 COLL VENOUS BLD VENIPUNCTURE: CPT | Performed by: HOSPITALIST

## 2018-10-26 RX ORDER — FAMOTIDINE 20 MG/1
20 TABLET, FILM COATED ORAL 2 TIMES DAILY
Status: DISCONTINUED | OUTPATIENT
Start: 2018-10-26 | End: 2018-10-27

## 2018-10-26 RX ORDER — TAMOXIFEN CITRATE 10 MG/1
20 TABLET, FILM COATED ORAL DAILY
Status: DISCONTINUED | OUTPATIENT
Start: 2018-10-26 | End: 2018-10-28

## 2018-10-26 RX ADMIN — LOSARTAN POTASSIUM 50 MG: 50 TABLET ORAL at 09:07

## 2018-10-26 RX ADMIN — APIXABAN 2.5 MG: 2.5 TABLET, FILM COATED ORAL at 17:15

## 2018-10-26 RX ADMIN — FOLIC ACID 1 MG: 1 TABLET ORAL at 09:07

## 2018-10-26 RX ADMIN — FUROSEMIDE 20 MG: 20 TABLET ORAL at 09:07

## 2018-10-26 RX ADMIN — METHYLPREDNISOLONE SODIUM SUCCINATE 40 MG: 40 INJECTION, POWDER, FOR SOLUTION INTRAMUSCULAR; INTRAVENOUS at 05:30

## 2018-10-26 RX ADMIN — SODIUM CHLORIDE 40 MG: 9 INJECTION INTRAMUSCULAR; INTRAVENOUS; SUBCUTANEOUS at 09:08

## 2018-10-26 RX ADMIN — FAMOTIDINE 20 MG: 20 TABLET, FILM COATED ORAL at 17:15

## 2018-10-26 RX ADMIN — TAMOXIFEN CITRATE 20 MG: 10 TABLET, FILM COATED ORAL at 17:15

## 2018-10-26 RX ADMIN — METHYLPREDNISOLONE SODIUM SUCCINATE 40 MG: 40 INJECTION, POWDER, FOR SOLUTION INTRAMUSCULAR; INTRAVENOUS at 22:52

## 2018-10-26 RX ADMIN — METOPROLOL TARTRATE 50 MG: 50 TABLET ORAL at 17:15

## 2018-10-26 RX ADMIN — LEVOTHYROXINE SODIUM 100 MCG: 100 TABLET ORAL at 22:51

## 2018-10-26 RX ADMIN — METOPROLOL TARTRATE 50 MG: 50 TABLET ORAL at 09:07

## 2018-10-26 NOTE — PROGRESS NOTES
Problem: Falls - Risk of 
Goal: *Absence of Falls Document Deborah Love Fall Risk and appropriate interventions in the flowsheet. Outcome: Progressing Towards Goal 
Fall Risk Interventions:

## 2018-10-26 NOTE — NURSE NAVIGATOR
Reason for Admission:    
Fluid Overload RRAT Score:    13 Do you (patient/family) have any concerns for transition/discharge? Patient states that she feels weak and that she cannot return home. Patient states that she is on many medications and that has become a burden for her. Plan for utilizing home health:      
Patient and nurse navigator discussed transition to skilled nursing facility if patient meets admission criteria. Likelihood of readmission? Moderate Transition of Care Plan:  
[place]. Patient does not have a choice of facility at this time. Patient reports that she lives alone. She is weak and cannot fully complete ADLs. Patient states that she has not had a bath in two weeks and that she has been eating dry cereal and milk. Patient states she can walk a few steps. She has a walker that was her 's at home. Patient states that she recently had an overnight test to see if she needs Oxygen. Patient reports that she has 4 stepchildren and 1 biological child. Patient reports that she has a long term care policy in addition to [facility] and [facility]. Patient would need PT and OT assessments to be considered for transition to a skilled nursing facility. Care Management Interventions PCP Verified by CM: Yes 
Palliative Care Criteria Met (RRAT>21 & CHF Dx)?: No 
Mode of Transport at Discharge: Other (see comment)(To be Determined) Transition of Care Consult (CM Consult): Discharge Planning Physical Therapy Consult: No 
Occupational Therapy Consult: No 
Speech Therapy Consult: No 
Current Support Network: Lives Alone Confirm Follow Up Transport: Other (see comment)(To Be Determined) Plan discussed with Pt/Family/Caregiver: Yes Discharge Location Discharge Placement: Skilled nursing facility Helen Campo RN  
[phone]

## 2018-10-26 NOTE — PROGRESS NOTES
NUTRITION Nursing Referral: [facility] 
  
RECOMMENDATIONS / PLAN:  
 
- Add gluten free restriction to diet order per pt request.  
- Monitor po intake and diet tolerance. - Add nutritional supplement: Ensure Enlive once daily. - Continue RD inpatient monitoring and evaluation. NUTRITION INTERVENTIONS & DIAGNOSIS:  
 
[x] Meals/snacks: modified composition 
[x] Medical food supplement therapy: initiate Nutrition Diagnosis: Inadequate energy intake related to decreased appetite and SOB with eating as evidenced by pt report of fair/poor intake x 2 months PTA. ASSESSMENT:  
 
Pt with fair intake this morning for breakfast, did not eat some of meal because of gluten sensitivity. Pt reports sharp abdominal pains and sometimes with emesis associated with consuming foods containing gluten. Unplanned weight loss and inadequate intake x 2 months due to pt complaining of weakness and decreased energy to prepare meals. Drinking Boost supplements at home, pt agreeable to Ensure. Average po intake adequate to meet patients estimated nutritional needs:   [] Yes     [] No   [x] Unable to determine at this time Diet: DIET CARDIAC Regular Food Allergies: NKFA Current Appetite:   [] Good     [x] Fair     [] Poor     [] Other: 
Appetite/meal intake prior to admission:   [] Good     [] Fair     [x] Poor- pt reports eating cereal and milk 2-3x per day for 2 months     [] Other: 
Feeding Limitations:  [] Swallowing difficulty    [] Chewing difficulty    [x] Other: SOB with eating Current Meal Intake:  
Patient Vitals for the past 100 hrs: 
 % Diet Eaten 10/26/18 1252 100 % BM: 10/25 Skin Integrity: WDL Edema:   [x] No     [] Yes Pertinent Medications: Reviewed: folic acid, zofran prn Recent Labs 10/25/18 
1316 *  
K 4.0  
CL 99* CO2 20* * BUN 46* CREA 1.82* CA 8.0*  
MG 1.5* ALB 2.3*  
SGOT 109* * Intake/Output Summary (Last 24 hours) at 10/26/2018 1343 Last data filed at 10/26/2018 1252 Gross per 24 hour Intake 720 ml Output 2000 ml Net -1280 ml Anthropometrics: 
Ht Readings from Last 1 Encounters:  
10/25/18 5' (1.524 m) Last 3 Recorded Weights in this Encounter 10/25/18 1308 10/26/18 0356 Weight: 60.8 kg (134 lb) 60.8 kg (134 lb) Body mass index is 26.17 kg/m². Weight History: Pt reports usual wt of 145 lbs and 10 lb wt loss x 2 months PTA, per chart review 9 lb wt loss,6.3% x 5 months Weight Metrics 10/26/2018 10/25/2018 10/25/2018 5/30/2018 5/9/2018 5/7/2018 3/28/2018 Weight 134 lb - - 143 lb 143 lb 144 lb 142 lb BMI - 26.17 kg/m2 27.93 kg/m2 27.93 kg/m2 27.93 kg/m2 28.12 kg/m2 27.73 kg/m2 Admitting Diagnosis: Fluid overload Pertinent PMHx: breast cancer (2014), colon polyps, elevated cholesterol, GERD, HTN, pure hyperglyceridemia, thyroid disease Education Needs:        [x] None identified  [] Identified - Not appropriate at this time  []  Identified and addressed - refer to education log Learning Limitations:   [] None identified  [x] Identified - hard of hearing Cultural, Mu-ism & ethnic food preferences:  [x] None identified    [] Identified and addressed ESTIMATED NUTRITION NEEDS:  
 
Calories: 7151-2847 kcal (MSJx1.2-1.4) based on  [x] Actual BW 61 kg      [] IBW Protein:  49-61 gm (0.8-1 gm/kg) based on  [x] Actual BW      [] IBW Fluid: 1 mL/kcal 
  
MONITORING & EVALUATION:  
 
Nutrition Goal(s): 1. Po intake of meals will meet >75% of patient estimated nutritional needs within the next 7 days. Outcome:  [] Met/Ongoing    []  Not Met    [x] New/Initial Goal  
 
Monitoring:   [x] Food and beverage intake   [x] Diet order   [x] Nutrition-focused physical findings   [x] Treatment/therapy   [] Weight   [] Enteral nutrition intake Previous Recommendations (for follow-up assessments only):     [] Implemented       []   Not Implemented (RD to address)      [] No Longer Appropriate     [] No Recommendation Made Discharge Planning: Cardiac diet [x] Participated in care planning, discharge planning, & interdisciplinary rounds as appropriate Christal Rich Dietetic Intern Pager: [phone]

## 2018-10-26 NOTE — PROGRESS NOTES
[facility] Hospitalist Group Progress Note Patient: Maribell Grace Age: 80 y.o. : 1932 MR#: [id number] SSN: [id number] Date/Time: 10/26/2018 Subjective: pt had multiple complaints including difficulty getting around house, some time SOB, sometime diarrhea. Difficulty cooking and she live alone. Denies any cough/fever/chills/dysuria Assessment/Plan: 1. Pulmonary fibrosis with ? Flare: will cont steroids, BD and o2. If no improvement then pul eval  
2. Hyponatremia: will monitor ? Lasix related. Will hold lasix 3. Acute debility due to multiple co morbidities: will get PT/OT 4. 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 5. Essential hypertension: BP stable 6. Hyperlipidemia: cont statin 7. PMR (polymyalgia rheumatica): cont supportive crae   
8. History of breast cancer:  
9. Hx DVT on eliquis Repeat labs now and in am  
D/w pt about adv directives, she wants DNR. D/w pt and family son Blake Cords [phone] at bedside in detail about above plan care, they aggred with plan. I spent 40 minutes with the patient in face-to-face consultation, of which greater than 50% was spent in counseling and coordination of care as described above. Case discussed with:  [x]Patient  [x]Family  [x]Nursing  []Case Management DVT Prophylaxis:  [x]eliquis  []Hep SQ  []SCDs  []Coumadin   []On Heparin gtt Objective:  
VS:  
Visit Vitals /69 (BP 1 Location: Left arm, BP Patient Position: At rest) Pulse 61 Temp 96.7 °F (35.9 °C) Resp 18 Wt 60.8 kg (134 lb) SpO2 98% Breastfeeding? No  
BMI 26.17 kg/m² Tmax/24hrs: Temp (24hrs), Av.7 °F (36.5 °C), Min:96.5 °F (35.8 °C), Max:99.1 °F (37.3 °C) IOBRIEF Intake/Output Summary (Last 24 hours) at 10/26/2018 1308 Last data filed at 10/26/2018 1252 Gross per 24 hour Intake 720 ml Output 2000 ml Net -1280 ml General:  Alert, cooperative, no acute distress   
 Pulmonary: Bilaterally Velcro rales. Min exp Wheezing, no Rhonchi/Rales. Cardiovascular: Regular rate and Rhythm. No JVD 
GI:  Soft, Non distended, Non tender. + Bowel sounds. Extremities: No edema, cyanosis, clubbing. Psych: Good insight. Not anxious or agitated. Neurologic: Alert and oriented X 3. No acute neuro deficits. Medications:  
Current Facility-Administered Medications Medication Dose Route Frequency  tamoxifen (NOLVADEX) tablet 20 mg  20 mg Oral DAILY  apixaban (ELIQUIS) tablet 2.5 mg  2.5 mg Oral BID  folic acid (FOLVITE) tablet 1 mg  800 mcg Oral DAILY  levothyroxine (SYNTHROID) tablet 100 mcg  100 mcg Oral QHS  losartan (COZAAR) tablet 50 mg  50 mg Oral DAILY  metoprolol tartrate (LOPRESSOR) tablet 50 mg  50 mg Oral BID  methylPREDNISolone (PF) (SOLU-MEDROL) injection 40 mg  40 mg IntraVENous Q6H  
 pantoprazole (PROTONIX) 40 mg in sodium chloride 0.9% 10 mL injection  40 mg IntraVENous Q12H  
 acetaminophen (TYLENOL) tablet 650 mg  650 mg Oral Q4H PRN  
 naloxone (NARCAN) injection 0.4 mg  0.4 mg IntraVENous PRN  
 ondansetron (ZOFRAN) injection 4 mg  4 mg IntraVENous Q4H PRN Labs:   
Recent Results (from the past 24 hour(s)) EKG, 12 LEAD, INITIAL Collection Time: 10/25/18  1:15 PM  
Result Value Ref Range Ventricular Rate 90 BPM  
 Atrial Rate 90 BPM  
 P-R Interval 172 ms QRS Duration 90 ms Q-T Interval 350 ms QTC Calculation (Bezet) 428 ms Calculated P Axis 20 degrees Calculated R Axis -44 degrees Calculated T Axis 4 degrees Diagnosis Normal sinus rhythm Left axis deviation RSR' or QR pattern in V1 suggests right ventricular conduction delay Voltage criteria for left ventricular hypertrophy Inferior infarct , age undetermined Anterolateral infarct (cited on or before 21-NOV-2017) Abnormal ECG Confirmed by Mirlande Tinoco MD, Kun Guido ([id number]) on 10/25/2018 5:05:19 PM 
  
CBC WITH AUTOMATED DIFF  
 Collection Time: 10/25/18  1:16 PM  
Result Value Ref Range WBC 6.5 4.6 - 13.2 K/uL  
 RBC 3.25 (L) 4.20 - 5.30 M/uL HGB 9.8 (L) 12.0 - 16.0 g/dL HCT 28.3 (L) 35.0 - 45.0 % MCV 87.1 74.0 - 97.0 FL  
 MCH 30.2 24.0 - 34.0 PG  
 MCHC 34.6 31.0 - 37.0 g/dL  
 RDW 13.9 11.6 - 14.5 % PLATELET 616 924 - 081 K/uL MPV 8.7 (L) 9.2 - 11.8 FL  
 NEUTROPHILS 57 42 - 75 % BAND NEUTROPHILS 28 (H) 0 - 5 % LYMPHOCYTES 8 (L) 20 - 51 % MONOCYTES 6 2 - 9 % EOSINOPHILS 0 0 - 5 % BASOPHILS 0 0 - 3 % METAMYELOCYTES 1 (H) 0 %  
 ABS. NEUTROPHILS 5.5 1.8 - 8.0 K/UL  
 ABS. LYMPHOCYTES 0.5 (L) 0.8 - 3.5 K/UL  
 ABS. MONOCYTES 0.4 0 - 1.0 K/UL  
 ABS. EOSINOPHILS 0.0 0.0 - 0.4 K/UL  
 ABS. BASOPHILS 0.0 0.0 - 0.06 K/UL  
 DF MANUAL PLATELET COMMENTS Increased Platelets RBC COMMENTS NORMOCYTIC, NORMOCHROMIC METABOLIC PANEL, COMPREHENSIVE Collection Time: 10/25/18  1:16 PM  
Result Value Ref Range Sodium 130 (L) 136 - 145 mmol/L Potassium 4.0 3.5 - 5.5 mmol/L Chloride 99 (L) 100 - 108 mmol/L  
 CO2 20 (L) 21 - 32 mmol/L Anion gap 11 3.0 - 18 mmol/L Glucose 119 (H) 74 - 99 mg/dL BUN 46 (H) 7.0 - 18 MG/DL Creatinine 1.82 (H) 0.6 - 1.3 MG/DL  
 BUN/Creatinine ratio 25 (H) 12 - 20 GFR est AA 32 (L) >60 ml/min/1.73m2 GFR est non-AA 26 (L) >60 ml/min/1.73m2 Calcium 8.0 (L) 8.5 - 10.1 MG/DL Bilirubin, total 0.4 0.2 - 1.0 MG/DL  
 ALT (SGPT) 156 (H) 13 - 56 U/L  
 AST (SGOT) 109 (H) 15 - 37 U/L Alk. phosphatase 46 45 - 117 U/L Protein, total 6.4 6.4 - 8.2 g/dL Albumin 2.3 (L) 3.4 - 5.0 g/dL Globulin 4.1 (H) 2.0 - 4.0 g/dL A-G Ratio 0.6 (L) 0.8 - 1.7 NT-PRO BNP Collection Time: 10/25/18  1:16 PM  
Result Value Ref Range NT pro-BNP 2,710 (H) 0 - 1,800 PG/ML  
LIPASE Collection Time: 10/25/18  1:16 PM  
Result Value Ref Range Lipase 62 (L) 73 - 393 U/L MAGNESIUM Collection Time: 10/25/18  1:16 PM  
Result Value Ref Range Magnesium 1.5 (L) 1.6 - 2.6 mg/dL ACETAMINOPHEN Collection Time: 10/25/18  1:16 PM  
Result Value Ref Range Acetaminophen level <2 (L) 10.0 - 30.0 ug/mL ETHYL ALCOHOL Collection Time: 10/25/18  1:16 PM  
Result Value Ref Range ALCOHOL(ETHYL),SERUM <3 0 - 3 MG/DL  
SALICYLATE Collection Time: 10/25/18  1:16 PM  
Result Value Ref Range Salicylate level <8.9 (L) 2.8 - 20.0 MG/DL URINALYSIS W/ RFLX MICROSCOPIC Collection Time: 10/25/18  4:54 PM  
Result Value Ref Range Color YELLOW Appearance CLEAR Specific gravity 1.014 1.005 - 1.030    
 pH (UA) 5.0 5.0 - 8.0 Protein NEGATIVE  NEG mg/dL Glucose NEGATIVE  NEG mg/dL Ketone NEGATIVE  NEG mg/dL Bilirubin NEGATIVE  NEG Blood NEGATIVE  NEG Urobilinogen 0.2 0.2 - 1.0 EU/dL Nitrites NEGATIVE  NEG Leukocyte Esterase SMALL (A) NEG    
DRUG SCREEN, URINE Collection Time: 10/25/18  4:54 PM  
Result Value Ref Range BENZODIAZEPINES NEGATIVE  NEG    
 BARBITURATES NEGATIVE  NEG    
 THC (TH-CANNABINOL) NEGATIVE  NEG    
 OPIATES NEGATIVE  NEG    
 PCP(PHENCYCLIDINE) NEGATIVE  NEG    
 COCAINE NEGATIVE  NEG    
 AMPHETAMINES NEGATIVE  NEG METHADONE NEGATIVE  NEG HDSCOM (NOTE) URINE MICROSCOPIC ONLY Collection Time: 10/25/18  4:54 PM  
Result Value Ref Range WBC 2 to 3 0 - 4 /hpf  
 RBC NONE 0 - 5 /hpf Epithelial cells 2+ 0 - 5 /lpf Bacteria FEW (A) NEG /hpf Hyaline cast 0 to 1 0 - 2 /lpf Signed By: Levorn Koyanagi, MD   
 October 26, 2018

## 2018-10-26 NOTE — PROGRESS NOTES
Pt arrived from ED via stretcher, on RA, PIV to Lt AC saline locked, vitals obtained and telemetry applied (see flowsheet). Dr. Luna Cherry at bedside assessing patient, oral cavity swabbed as ordered, pt voided 300 cc CYU in bedside commode. Pt placed in bed, no s/s distress noted at this time, call bell in reach, will continue to monitor.

## 2018-10-26 NOTE — NURSE NAVIGATOR
List of skilled nursing facilities in the area provided to patient. Patient states that she does not want to go to [facility] or  [facility]. List of facilities left with patient for review. Krishna Foy RN  
[phone]

## 2018-10-26 NOTE — ROUTINE PROCESS
Bedside and Verbal shift change report given to [facility] (oncoming nurse) by Benny Citizen (offgoing nurse). Report included the following information Kardex, Intake/Output and MAR.

## 2018-10-26 NOTE — HOME CARE
Rounded on this \"[facility] \" patient, explained to both pt and her son Satish Estrada) about [facility] services offered and left them a brochure on [facility]. SHARAN MARINELLI.

## 2018-10-26 NOTE — PROGRESS NOTES
Bedside and verbal report given to Sayda Aguila RN, with use of kardex. Rounded on pt Q 1 hour throughout shift. No s/s distress noted, call bell in reach.

## 2018-10-26 NOTE — PROGRESS NOTES
conducted an initial consultation and Spiritual Assessment for Mehul Ruffin, who is a 80 y.o.,female. Patients Primary Language is: [place]. According to the patients EMR Methodist Affiliation is: Hinduism. The reason the Patient came to the hospital is:  
Patient Active Problem List  
 Diagnosis Date Noted  Bifascicular bundle branch block 05/07/2018  Pulmonary fibrosis ([address] 11/03/2017  PMR (polymyalgia rheumatica) ([facility]) 06/28/2017  Essential hypertension 10/31/2015  Hyperlipidemia 10/31/2015 The  provided the following Interventions: 
Initiated a relationship of care and support. Explored issues of amie, belief, spirituality and Christian/ritual needs while hospitalized. Listened empathically. Provided chaplaincy education. Provided information about Spiritual Care Services. Offered prayer and assurance of continued prayers on patient's behalf. Chart reviewed. The following outcomes where achieved: 
Patient shared limited information about both their medical narrative and spiritual journey/beliefs.  confirmed Patient's Methodist Affiliation. Patient processed feeling about current hospitalization. Patient expressed gratitude for 's visit. Assessment: 
Patient does not have any Christian/cultural needs that will affect patients preferences in health care. There are no spiritual or Christian issues which require intervention at this time. Plan: 
Chaplains will continue to follow and will provide pastoral care on an as needed/requested basis.  recommends bedside caregivers page  on duty if patient shows signs of acute spiritual or emotional distress. Chaplain Resident Javid Iverson Spiritual Care  
[phone]

## 2018-10-26 NOTE — H&P
History & Physical 
Patient: Bennett Essex MRN: [id number]  [facility]: [id number] YOB: 1932  Age: 80 y.o. Sex: female DOA: 10/25/2018 HPI:  
 
Bennett Essex is a 80 y.o. female with the significant history below. She developed increased shortness of breath over the past 3 weeks. She went to her Primary Physician for a flu shot today. She had a low  
systolic BP 88 there He is concerned for her status and sent her tp the ED. In the ED her BP 92/59 and with oral fluids recovered quickly to 112/70 and 135/61, HR reg 70 to 80. She has no increased O2 requirement with pulse ox 99% RA. She sees Dr Jeaneth Howard for Pulmonary fibrosis. Ms. Aleshia Whitaker states she has severe Polymyalgia Rheumatica and between that and her pulmonary fibrosis She has become debilitated to the point of not being able to cook for herself. She reports that she had some diarrhea last week and had to call her daughter in law, who tended to her bathing and laundry. She stated she had some blood when she was wiped at that time, denies any further bleeding. Patient states she had a past DVT and is on Tamoxifen for her history of breast cancer, and remains on Eloquis for DVT prevention while on therapy. Patient reported a fall at home on Eloquis and had a CT in ED with no evidence of mets. Patient states she has been too weak to really be compliant with getting in all of her medications regularly. She has a son and several step children locally who come to help her. She states they have children and she does not want to be around children all the time, so she will not consider living with them, but cannot care for herself any more. She stated \" I am so tired, so weak, I go to bed hoping to not wake up in the morning. \"  
 
Past Medical History:  
Diagnosis Date  Autoimmune disease ([address]  Breast cancer ([facility] 2001, 1/ 2014  
 right, left  Cancer ([facility]  Colon polyps  Elevated cholesterol  Enlarged lymph nodes   
 pericardium  GERD (gastroesophageal reflux disease)  Hypertension  Ill-defined condition  to   
 lung-ground glass followed by Dr. Hand Stabs  Insomnia  Lichen plano-pilaris  Mass of breast, right   
 benign  Mass of breast, right   
 benign  Pure hyperglyceridemia  Thromboembolus ([facility] 2017  
 blood clot right leg  Thyroid disease Past Surgical History:  
Procedure Laterality Date  HX APPENDECTOMY  HX CATARACT REMOVAL Bilateral   HX CHOLECYSTECTOMY  HX HEMORRHOIDECTOMY  HX HERNIA REPAIR    
 small abdominal repair and fistula  HX HYSTERECTOMY  1991  
 cystocele  HX LYMPHADENECTOMY Right 2001  
 right axilla  HX MASTECTOMY Right   
 with reconstruction  HX MASTECTOMY Left   HX POLYPECTOMY    
 [id number] muliple removals  HX RECTOCELE REPAIR   Family History Problem Relation Age of Onset  Hypertension Mother  COPD Mother  Heart Disease Father Social History Socioeconomic History  Marital status:  Spouse name: Not on file  Number of children: Not on file  Years of education: Not on file  Highest education level: Not on file Social Needs  Financial resource strain: Not on file  Food insecurity - worry: Not on file  Food insecurity - inability: Not on file  Transportation needs - medical: Not on file  Transportation needs - non-medical: Not on file Occupational History  Not on file Tobacco Use  Smoking status: Former Smoker Packs/day: 2.00 Years: 9.00 Pack years: 18.00 Last attempt to quit: 1963 Years since quittin.8  Smokeless tobacco: Never Used Substance and Sexual Activity  Alcohol use: No  
  Alcohol/week: 0.5 oz Types: 1 Standard drinks or equivalent per week Comment: rare  Drug use: No  
 Sexual activity: Not Currently Other Topics Concern  Not on file Social History Narrative  Not on file Prior to Admission medications Medication Sig Start Date End Date Taking? Authorizing Provider  
metoprolol tartrate (LOPRESSOR) 50 mg tablet TAKE 1 TABLET BY MOUTH TWO  TIMES DAILY 9/18/18   Shey Madrid MD  
losartan (COZAAR) 100 mg tablet TAKE 1 TABLET BY MOUTH  DAILY 7/5/18   Shey Madrid MD  
ELIQUIS 2.5 mg tablet TAKE 1 TABLET BY MOUTH TWO  TIMES DAILY 7/5/18   ANNIA Zuniga  
dicyclomine (BENTYL) 10 mg capsule 1 three times per day (with each meal) 5/30/18   Shey Madrid MD  
furosemide (LASIX) 20 mg tablet 1 tablet each AM  Indications: patient states she is taking 1 and 1/2 tablet 5/18/18   Shey Madrid MD  
predniSONE (DELTASONE) 2.5 mg tablet Take  by mouth. Provider, Historical  
guanFACINE IR (TENEX) 1 mg IR tablet TAKE ONE-HALF TABLET BY  MOUTH EVERY DAY 1/23/18   Shey Madrid MD  
triamcinolone acetonide (KENALOG) 0.1 % topical cream Apply daily 12/18/17   Shey Madrid MD  
predniSONE (DELTASONE) 5 mg tablet Take 2 tablets (10 mg) by mouth once daily. Decrease to 1 1/2 tablets (7.5 mg) by mouth once daily on October 15, 2017. On 11/7/2017, dose will be further reduced to 1 tablet (5 mg) by mouth once daily as directed by Dr. Thao Loredo. Provider, Historical  
CALCIUM CITRATE/VITAMIN D3 (CITRACAL + D MAXIMUM PO) Take 2 Tabs by mouth daily. Provider, Historical  
folic acid 420 mcg tablet Take 800 mcg by mouth daily. Provider, Historical  
simvastatin (ZOCOR) 20 mg tablet Take 1 Tab by mouth nightly. 8/24/17   Shey Madrid MD  
raNITIdine (ZANTAC) 300 mg tablet Take 1 Tab by mouth daily. 8/24/17   Shey Madrid MD  
doxepin (SINEQUAN) 25 mg capsule Take 1 Cap by mouth nightly. 8/24/17   Shey Madrid MD  
levothyroxine (SYNTHROID) 100 mcg tablet Take 1 Tab by mouth nightly.  8/24/17   Shey Madrid MD  
peg 400-propylene glycol (SYSTANE, PROPYLENE GLYCOL,) 0.4-0.3 % drop Administer 1 Drop to both eyes as needed. Provider, Historical  
GLUCOSAMINE HCL/CHONDR LEGER A NA (OSTEO BI-FLEX PO) Take 2 Tabs by mouth daily. Provider, Historical  
cyanocobalamin 1,000 mcg tablet Take 1,000 mcg by mouth daily. Provider, Historical  
multivitamins-minerals-lutein (CENTRUM SILVER) tab tablet Take 1 Tab by mouth daily. Provider, Historical  
loratadine (CLARITIN) 10 mg tablet Take 10 mg by mouth daily. Provider, Historical  
tamoxifen (NOLVADEX) 20 mg tablet Take 20 mg by mouth daily. Provider, Historical  
clobetasol (OLUX) 0.05 % topical foam Apply  to affected area two (2) times daily as needed for Skin Irritation. use thin film on affected area    Provider, Historical  
 
 
Allergies Allergen Reactions  Adhesive Tape-Silicones Rash  Codeine Hives  Demerol [Meperidine] Nausea Only  Levaquin [Levofloxacin] Other (comments) Swelling in feet and legs Physical Exam:  
  
Visit Vitals /65 (BP 1 Location: Left arm, BP Patient Position: At rest) Pulse 74 Temp 97.9 °F (36.6 °C) Resp 18 Wt 60.8 kg (134 lb) SpO2 93% Breastfeeding? No  
BMI 26.17 kg/m² Physical Exam: 
GENERAL: alert, cooperative, no distress, no shortness of breath on conversation HEENT: [place], facial symmetry, pharynx with erythema -culture taken, no lesions Neck supple, no temporal or carotid bruits Chest: lungs with coarse breath sounds, no rhonchi or wheeze, HR reg 74 Abdomen: soft, BS+, non tender Ext: required assist x 2 stretcher in room to [facility] to bed secondary to generalized muscle weakness, No edema, + pulses, can move well when in bed Psych: patient expresses despair over her declining strength and ability for ADL's, she denies Any plan for self harm Lab/Data Review: 
Labs: Results:  
   
Chemistry Recent Labs 10/25/18 
1316 * *  
K 4.0  
CL 99* CO2 20* BUN 46* CREA 1.82* CA 8.0* AGAP 11 BUCR 25* AP 46  
TP 6.4 ALB 2.3*  
GLOB 4.1* AGRAT 0.6* CBC w/Diff Recent Labs 10/25/18 
1316 WBC 6.5  
RBC 3.25* HGB 9.8* HCT 28.3*  
 GRANS 57 LYMPH 8*  
EOS 0 Coagulation No results for input(s): PTP, INR, APTT in the last 72 hours. No lab exists for component: INREXT Iron/Ferritin No results for input(s): IRON in the last 72 hours. No lab exists for component: TIBCCALC BNP No results for input(s): BNPP in the last 72 hours. Cardiac Enzymes No results for input(s): CPK, CKND1, MARCIN in the last 72 hours. No lab exists for component: Gabby Pldali Liver Enzymes Recent Labs 10/25/18 
1316 TP 6.4 ALB 2.3* AP 46 SGOT 109* Thyroid Studies Lab Results Component Value Date/Time TSH 1.15 05/09/2018 11:30 AM  
    
 
All Micro Results Procedure Component Value Units Date/Time THROAT CULTURE [[id number]] Collected:  10/25/18 1957 Order Status:  Completed Specimen:  Throat swab Updated:  10/25/18 2115 Imaging Reviewed: XR Results (most recent): 
Results from Hospital Encounter encounter on 10/25/18 XR CHEST PA LAT Narrative EXAM:  XR CHEST PA LAT INDICATION:   Shortness of breath and tired, evaluate for pneumonia COMPARISON: 8/28/2006. FINDINGS: 
Upper limit cardiac silhouette. Aortic atherosclerosis. Pulmonary vascular 
congestion. No pneumothorax, pleural effusion or focal consolidation. Mild 
thoracic spondylosis. Surgical clips in the right axilla. Impression IMPRESSION: 
 
Upper normal enlargement of the cardiac silhouette with pulmonary vascular 
congestion. No pulmonary edema. No evidence for pneumonia. Assessment:  
Principal Problem: 
  Pulmonary fibrosis ([address] (11/3/2017) Active Problems: 
  Essential hypertension (10/31/2015) Hyperlipidemia (10/31/2015) PMR (polymyalgia rheumatica) ([facility]) (6/28/2017) History of breast cancer (10/25/2018) Fall at home (10/26/2018) Overview: On Eloquis Plan: Admit for shortness of breath secondary to pulmonary fibrosis with increasing effort and muscle energy demands for ADL's due to her Polymyalgia Rheumatica Solumedrol 40 mg q 6 h and taper to clinical status Patient had trace edema; bibasilar crackles in The ED and received IV lasix, no JVD, edema or crackles now Continue home meds, GI prophylaxis Consult PT/OT for evaluations for SNF placement with transition to LTC vs assisted living Case management consult for disposition planning Monitor depressed mood,patient expressed feelings of isolation and helplessness that have overwhelmed her; may improve with provision of needed care and nutrition Full Code Shaniqua Thorpe,  
10/25/2018, 9:29 PM

## 2018-10-27 LAB
ALBUMIN SERPL-MCNC: 2 G/DL (ref 3.4–5)
ALBUMIN/GLOB SERPL: 0.5 {RATIO} (ref 0.8–1.7)
ALP SERPL-CCNC: 51 U/L (ref 45–117)
ALT SERPL-CCNC: 179 U/L (ref 13–56)
ANION GAP SERPL CALC-SCNC: 10 MMOL/L (ref 3–18)
AST SERPL-CCNC: 122 U/L (ref 15–37)
BACTERIA SPEC CULT: NORMAL
BACTERIA SPEC CULT: NORMAL
BASOPHILS # BLD: 0 K/UL (ref 0–0.06)
BASOPHILS NFR BLD: 0 % (ref 0–3)
BILIRUB SERPL-MCNC: 0.4 MG/DL (ref 0.2–1)
BUN SERPL-MCNC: 54 MG/DL (ref 7–18)
BUN/CREAT SERPL: 31 (ref 12–20)
CALCIUM SERPL-MCNC: 7.9 MG/DL (ref 8.5–10.1)
CHLORIDE SERPL-SCNC: 104 MMOL/L (ref 100–108)
CO2 SERPL-SCNC: 22 MMOL/L (ref 21–32)
CREAT SERPL-MCNC: 1.73 MG/DL (ref 0.6–1.3)
DIFFERENTIAL METHOD BLD: ABNORMAL
EOSINOPHIL # BLD: 0 K/UL (ref 0–0.4)
EOSINOPHIL NFR BLD: 0 % (ref 0–5)
ERYTHROCYTE [DISTWIDTH] IN BLOOD BY AUTOMATED COUNT: 14 % (ref 11.6–14.5)
GLOBULIN SER CALC-MCNC: 3.8 G/DL (ref 2–4)
GLUCOSE SERPL-MCNC: 150 MG/DL (ref 74–99)
HCT VFR BLD AUTO: 25.6 % (ref 35–45)
HGB BLD-MCNC: 9.1 G/DL (ref 12–16)
LYMPHOCYTES # BLD: 0.7 K/UL (ref 0.8–3.5)
LYMPHOCYTES NFR BLD: 10 % (ref 20–51)
MAGNESIUM SERPL-MCNC: 1.7 MG/DL (ref 1.6–2.6)
MCH RBC QN AUTO: 30.6 PG (ref 24–34)
MCHC RBC AUTO-ENTMCNC: 35.5 G/DL (ref 31–37)
MCV RBC AUTO: 86.2 FL (ref 74–97)
MONOCYTES # BLD: 0.2 K/UL (ref 0–1)
MONOCYTES NFR BLD: 3 % (ref 2–9)
NEUTS BAND NFR BLD MANUAL: 9 % (ref 0–5)
NEUTS SEG # BLD: 6 K/UL (ref 1.8–8)
NEUTS SEG NFR BLD: 78 % (ref 42–75)
PLATELET # BLD AUTO: 357 K/UL (ref 135–420)
PLATELET COMMENTS,PCOM: ABNORMAL
PMV BLD AUTO: 8.6 FL (ref 9.2–11.8)
POTASSIUM SERPL-SCNC: 4.3 MMOL/L (ref 3.5–5.5)
PROT SERPL-MCNC: 5.8 G/DL (ref 6.4–8.2)
RBC # BLD AUTO: 2.97 M/UL (ref 4.2–5.3)
RBC MORPH BLD: ABNORMAL
SERVICE CMNT-IMP: NORMAL
SODIUM SERPL-SCNC: 136 MMOL/L (ref 136–145)
WBC # BLD AUTO: 6.9 K/UL (ref 4.6–13.2)

## 2018-10-27 PROCEDURE — 65660000000 HC RM CCU STEPDOWN

## 2018-10-27 PROCEDURE — 74011250637 HC RX REV CODE- 250/637: Performed by: HOSPITALIST

## 2018-10-27 PROCEDURE — 36415 COLL VENOUS BLD VENIPUNCTURE: CPT | Performed by: HOSPITALIST

## 2018-10-27 PROCEDURE — 83735 ASSAY OF MAGNESIUM: CPT | Performed by: HOSPITALIST

## 2018-10-27 PROCEDURE — 74011250636 HC RX REV CODE- 250/636: Performed by: HOSPITALIST

## 2018-10-27 PROCEDURE — 97161 PT EVAL LOW COMPLEX 20 MIN: CPT

## 2018-10-27 PROCEDURE — 97116 GAIT TRAINING THERAPY: CPT

## 2018-10-27 PROCEDURE — 80053 COMPREHEN METABOLIC PANEL: CPT | Performed by: HOSPITALIST

## 2018-10-27 PROCEDURE — 77010033678 HC OXYGEN DAILY

## 2018-10-27 PROCEDURE — 85025 COMPLETE CBC W/AUTO DIFF WBC: CPT | Performed by: HOSPITALIST

## 2018-10-27 RX ORDER — FAMOTIDINE 20 MG/1
20 TABLET, FILM COATED ORAL DAILY
Status: DISCONTINUED | OUTPATIENT
Start: 2018-10-28 | End: 2018-10-29 | Stop reason: HOSPADM

## 2018-10-27 RX ORDER — IBUPROFEN 200 MG
400 CAPSULE ORAL DAILY
Status: DISCONTINUED | OUTPATIENT
Start: 2018-10-28 | End: 2018-10-29 | Stop reason: HOSPADM

## 2018-10-27 RX ORDER — PREDNISONE 20 MG/1
40 TABLET ORAL
Status: DISCONTINUED | OUTPATIENT
Start: 2018-10-29 | End: 2018-10-29 | Stop reason: HOSPADM

## 2018-10-27 RX ADMIN — LEVOTHYROXINE SODIUM 100 MCG: 100 TABLET ORAL at 22:28

## 2018-10-27 RX ADMIN — METOPROLOL TARTRATE 50 MG: 50 TABLET ORAL at 17:16

## 2018-10-27 RX ADMIN — APIXABAN 2.5 MG: 2.5 TABLET, FILM COATED ORAL at 17:16

## 2018-10-27 RX ADMIN — METOPROLOL TARTRATE 50 MG: 50 TABLET ORAL at 08:05

## 2018-10-27 RX ADMIN — LOSARTAN POTASSIUM 50 MG: 50 TABLET ORAL at 08:05

## 2018-10-27 RX ADMIN — METHYLPREDNISOLONE SODIUM SUCCINATE 40 MG: 40 INJECTION, POWDER, FOR SOLUTION INTRAMUSCULAR; INTRAVENOUS at 08:08

## 2018-10-27 RX ADMIN — METHYLPREDNISOLONE SODIUM SUCCINATE 20 MG: 40 INJECTION, POWDER, FOR SOLUTION INTRAMUSCULAR; INTRAVENOUS at 22:28

## 2018-10-27 RX ADMIN — FOLIC ACID 1 MG: 1 TABLET ORAL at 08:05

## 2018-10-27 RX ADMIN — FAMOTIDINE 20 MG: 20 TABLET, FILM COATED ORAL at 08:05

## 2018-10-27 RX ADMIN — APIXABAN 2.5 MG: 2.5 TABLET, FILM COATED ORAL at 08:05

## 2018-10-27 RX ADMIN — TAMOXIFEN CITRATE 20 MG: 10 TABLET, FILM COATED ORAL at 08:05

## 2018-10-27 NOTE — PROGRESS NOTES
Problem: Mobility Impaired (Adult and Pediatric) Goal: *Acute Goals and Plan of Care (Insert Text) Physical Therapy Goals Initiated 10/27/2018 and to be accomplished within 7 day(s) 1. Patient will move from supine to sit and sit to supine  in bed with modified independence. 2.  Patient will transfer from bed to chair and chair to bed with modified independence using the least restrictive device. 3.  Patient will perform sit to stand with modified independence. 4.  Patient will ambulate with modified independence for 300 feet with the least restrictive device. 5.  Patient will ascend/descend 4 stairs with 1 handrail(s) with supervision/set-up. physical Therapy EVALUATION Patient: Murtaza Borden (15 y.o. female) Date: 10/27/2018 Primary Diagnosis: Fluid overload Precautions: O2, fall ASSESSMENT : 
Based on the objective data described below, the patient presents with decreased strength and dynamic standing balance without support resulting in decreased independence with functional mobility. Pt performed all transfers at the supervision level and ambulated 400 feet with RW and SBA while on 2 L of O2. Pt was constantly focusing on the fact that she felt overwhelmed taking care of herself at home however was not open to any suggestions being given to her for activity modification. Patient will benefit from skilled intervention to address the above impairments. Patients rehabilitation potential is considered to be Fair Factors which may influence rehabilitation potential include:  
[]         None noted 
[]         Mental ability/status [x]         Medical condition 
[x]         Home/family situation and support systems 
[]         Safety awareness 
[]         Pain tolerance/management 
[]         Other: PLAN : 
Recommendations and Planned Interventions: 
[x]           Bed Mobility Training             [x]    Neuromuscular Re-Education [x]           Transfer Training                   []    Orthotic/Prosthetic Training 
[x]           Gait Training                          []    Modalities [x]           Therapeutic Exercises          []    Edema Management/Control 
[x]           Therapeutic Activities            [x]    Patient and Family Training/Education 
[]           Other (comment): Frequency/Duration: Patient will be followed by physical therapy 1-2 times per day/4-7 days per week to address goals. Discharge Recommendations: Home Health Further Equipment Recommendations for Discharge: rolling walker G-CODES Mobility [id number] Current  CJ= 20-39%   Goal  CI= 1-19%. The severity rating is based on the Level of Assistance required for Functional Mobility and ADLs. Eval Complexity: History: MEDIUM  Complexity : 1-2 comorbidities / personal factors will impact the outcome/ POC Exam:LOW Complexity : 1-2 Standardized tests and measures addressing body structure, function, activity limitation and / or participation in recreation  Presentation: LOW Complexity : Stable, uncomplicated  Clinical Decision Making:Low Complexity , Overall Complexity:LOW SUBJECTIVE:  
Patient stated I just can't do anything.  OBJECTIVE DATA SUMMARY:  
 
Past Medical History:  
Diagnosis Date  Autoimmune disease ([facility] [address]  Breast cancer ([facility] [address] 2001, 1/ 2014  
 right, left  Cancer ([facility] [address]  Colon polyps  Elevated cholesterol  Enlarged lymph nodes   
 pericardium  GERD (gastroesophageal reflux disease)  Hypertension  Ill-defined condition 2007 to 2010  
 lung-ground glass followed by Dr. Dori Nam  Insomnia  Lichen plano-pilaris  Mass of breast, right 1976  
 benign  Mass of breast, right 1994  
 benign  Pure hyperglyceridemia  Thromboembolus ([facility] [address] 09/2017  
 blood clot right leg  Thyroid disease Past Surgical History:  
Procedure Laterality Date  HX APPENDECTOMY  HX CATARACT REMOVAL Bilateral 2010  HX CHOLECYSTECTOMY  HX HEMORRHOIDECTOMY  HX HERNIA REPAIR  2004  
 small abdominal repair and fistula  HX HYSTERECTOMY  1991  
 cystocele  HX LYMPHADENECTOMY Right 7/2001  
 right axilla  HX MASTECTOMY Right 2001  
 with reconstruction  HX MASTECTOMY Left 1/14  HX POLYPECTOMY    
 [id number] muliple removals  HX RECTOCELE REPAIR  2003 Prior Level of Function/Home Situation: pt reports not using an assistive device prior to admission but states that she probably should have been using a walker Home Situation Home Environment: Private residence # Steps to Enter: 3 One/Two Story Residence: One story Living Alone: Yes Support Systems: Family member(s) Patient Expects to be Discharged to[de-identified] Private residence Current DME Used/Available at Home: NoneCritical Behavior: 
Neurologic State: Alert Orientation Level: Appropriate for age Cognition: Appropriate decision making Strength:   
Strength: Generally decreased, functional(B LEs) Tone & Sensation:  
Tone: Normal(B LEs) Range Of Motion: 
AROM: Within functional limits(B LEs) Functional Mobility: 
Bed Mobility: 
Supine to Sit: Supervision Sit to Supine: Supervision Transfers: 
Sit to Stand: Supervision Stand to Sit: Supervision Bed to Chair: Stand-by assistance Balance:  
Sitting: Intact Standing: With support Standing - Static: Good Standing - Dynamic : FairAmbulation/Gait Training: 
Distance (ft): 400 Feet (ft) Assistive Device: Walker, rolling Ambulation - Level of Assistance: Stand-by assistance Gait Abnormalities: Decreased step clearance Pain: 
Pt reports 0/10 pain or discomfort prior to treatment.   
Pt reports 0/10 pain or discomfort post treatment. Activity Tolerance:  
fair Please refer to the flowsheet for vital signs taken during this treatment. After treatment:  
[x]         Patient left in no apparent distress sitting up in chair []         Patient left in no apparent distress in bed 
[x]         Call bell left within reach [x]         Nursing notified 
[]         Caregiver present 
[]         Bed alarm activated COMMUNICATION/EDUCATION:  
[x]         Fall prevention education was provided and the patient/caregiver indicated understanding. [x]         Patient/family have participated as able in goal setting and plan of care. [x]         Patient/family agree to work toward stated goals and plan of care. []         Patient understands intent and goals of therapy, but is neutral about his/her participation. []         Patient is unable to participate in goal setting and plan of care. Educated patient on use of RW and increasing activity with assistance Thank you for this referral. 
Ca De Los Santos, PT Time Calculation: 28 mins

## 2018-10-27 NOTE — ROUTINE PROCESS
Report received from [facility]. Patient is alert, in bed, no distress noted. Call bell in reach. 0000 Alert, assisted to bsc to void then back to bed. Call bell in reach. 0730  Bedside, Verbal and Written shift change report given to Deshawn Smith RN (oncoming nurse) by Jasmyn Patrick RN (offgoing nurse). Report included the following information SBAR, Kardex, MAR and Accordion.

## 2018-10-27 NOTE — ROUTINE PROCESS
Bedside and Verbal shift change report given to [facility] (oncoming nurse) by Clayton Betancourt (offgoing nurse). Report included the following information Kardex, Intake/Output and MAR.

## 2018-10-27 NOTE — PROGRESS NOTES
[facility] Hospitalist Group Progress Note Patient: Kecia Severino Age: 80 y.o. : 1932 MR#: [id number] SSN: [id number] Date/Time: 10/27/2018 Subjective: pt feels lot better, ambulated in hallway with walker with PT Assessment/Plan: 1. Pulmonary fibrosis with ? Flare: will taper steroids, BD and o2. If no improvement then pul eval  
2. Hyponatremia: will monitor ? Lasix related. Improving 3. Acute debility due to multiple co morbidities: cont PT/OT 4. 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 5. Essential hypertension: BP stable 5. Mild abnormal LFT's: ? Related tamoxifen, hold statin. Will get hep panel 6. Hyperlipidemia: hold statin 7. PMR (polymyalgia rheumatica): cont supportive crae   
8. History of breast cancer:  
9. Hx DVT on eliquis Repeat labs now and in am  
DNR. D/w pt in detail Son Dallas Murguia [phone]. D/w  Case discussed with:  [x]Patient  [x]Family  [x]Nursing  []Case Management DVT Prophylaxis:  [x]eliquis  []Hep SQ  []SCDs  []Coumadin   []On Heparin gtt Objective:  
VS:  
Visit Vitals /61 (BP 1 Location: Left arm, BP Patient Position: Sitting) Pulse (!) 54 Temp 97.6 °F (36.4 °C) Resp 20 Wt 60.8 kg (134 lb) SpO2 97% Breastfeeding? No  
BMI 26.17 kg/m² Tmax/24hrs: Temp (24hrs), Av.7 °F (36.5 °C), Min:96.8 °F (36 °C), Max:98.4 °F (36.9 °C) IOBRIEF Intake/Output Summary (Last 24 hours) at 10/27/2018 1234 Last data filed at 10/27/2018 5887 Gross per 24 hour Intake 240 ml Output 1600 ml Net -1360 ml General:  Alert, cooperative, no acute distress   
Pulmonary: Bilaterally Velcro rales. No Wheezing. Cardiovascular: Regular rate and Rhythm. No JVD 
GI:  Soft, Non distended, Non tender. + Bowel sounds. Extremities: No edema, cyanosis, clubbing. Psych: Good insight. Not anxious or agitated. Neurologic: Alert and oriented X 3. No acute neuro deficits. Medications:  
Current Facility-Administered Medications Medication Dose Route Frequency  methylPREDNISolone (PF) (SOLU-MEDROL) injection 20 mg  20 mg IntraVENous Q12H  
 [START ON 10/29/2018] predniSONE (DELTASONE) tablet 40 mg  40 mg Oral DAILY WITH BREAKFAST  
 . PHARMACY TO SUBSTITUTE PER PROTOCOL (Reordered from: CALCIUM CITRATE/VITAMIN D3 (CITRACAL + D MAXIMUM PO))    Per Protocol  tamoxifen (NOLVADEX) tablet 20 mg  20 mg Oral DAILY  apixaban (ELIQUIS) tablet 2.5 mg  2.5 mg Oral BID  famotidine (PEPCID) tablet 20 mg  20 mg Oral BID  folic acid (FOLVITE) tablet 1 mg  800 mcg Oral DAILY  levothyroxine (SYNTHROID) tablet 100 mcg  100 mcg Oral QHS  losartan (COZAAR) tablet 50 mg  50 mg Oral DAILY  metoprolol tartrate (LOPRESSOR) tablet 50 mg  50 mg Oral BID  acetaminophen (TYLENOL) tablet 650 mg  650 mg Oral Q4H PRN  
 naloxone (NARCAN) injection 0.4 mg  0.4 mg IntraVENous PRN  
 ondansetron (ZOFRAN) injection 4 mg  4 mg IntraVENous Q4H PRN Labs:   
Recent Results (from the past 24 hour(s)) METABOLIC PANEL, BASIC Collection Time: 10/26/18  1:25 PM  
Result Value Ref Range Sodium 133 (L) 136 - 145 mmol/L Potassium 4.6 3.5 - 5.5 mmol/L Chloride 102 100 - 108 mmol/L  
 CO2 21 21 - 32 mmol/L Anion gap 10 3.0 - 18 mmol/L Glucose 159 (H) 74 - 99 mg/dL BUN 49 (H) 7.0 - 18 MG/DL Creatinine 1.65 (H) 0.6 - 1.3 MG/DL  
 BUN/Creatinine ratio 30 (H) 12 - 20 GFR est AA 36 (L) >60 ml/min/1.73m2 GFR est non-AA 29 (L) >60 ml/min/1.73m2 Calcium 7.5 (L) 8.5 - 10.1 MG/DL  
CBC WITH AUTOMATED DIFF Collection Time: 10/27/18 12:29 AM  
Result Value Ref Range WBC 6.9 4.6 - 13.2 K/uL  
 RBC 2.97 (L) 4.20 - 5.30 M/uL HGB 9.1 (L) 12.0 - 16.0 g/dL HCT 25.6 (L) 35.0 - 45.0 % MCV 86.2 74.0 - 97.0 FL  
 MCH 30.6 24.0 - 34.0 PG  
 MCHC 35.5 31.0 - 37.0 g/dL  
 RDW 14.0 11.6 - 14.5 % PLATELET 839 976 - 757 K/uL  MPV 8.6 (L) 9.2 - 11.8 FL  
 NEUTROPHILS 78 (H) 42 - 75 % BAND NEUTROPHILS 9 (H) 0 - 5 % LYMPHOCYTES 10 (L) 20 - 51 % MONOCYTES 3 2 - 9 % EOSINOPHILS 0 0 - 5 % BASOPHILS 0 0 - 3 %  
 ABS. NEUTROPHILS 6.0 1.8 - 8.0 K/UL  
 ABS. LYMPHOCYTES 0.7 (L) 0.8 - 3.5 K/UL  
 ABS. MONOCYTES 0.2 0 - 1.0 K/UL  
 ABS. EOSINOPHILS 0.0 0.0 - 0.4 K/UL  
 ABS. BASOPHILS 0.0 0.0 - 0.06 K/UL  
 DF AUTOMATED PLATELET COMMENTS ADEQUATE PLATELETS    
 RBC COMMENTS NORMOCYTIC, NORMOCHROMIC METABOLIC PANEL, COMPREHENSIVE Collection Time: 10/27/18 12:29 AM  
Result Value Ref Range Sodium 136 136 - 145 mmol/L Potassium 4.3 3.5 - 5.5 mmol/L Chloride 104 100 - 108 mmol/L  
 CO2 22 21 - 32 mmol/L Anion gap 10 3.0 - 18 mmol/L Glucose 150 (H) 74 - 99 mg/dL BUN 54 (H) 7.0 - 18 MG/DL Creatinine 1.73 (H) 0.6 - 1.3 MG/DL  
 BUN/Creatinine ratio 31 (H) 12 - 20 GFR est AA 34 (L) >60 ml/min/1.73m2 GFR est non-AA 28 (L) >60 ml/min/1.73m2 Calcium 7.9 (L) 8.5 - 10.1 MG/DL Bilirubin, total 0.4 0.2 - 1.0 MG/DL  
 ALT (SGPT) 179 (H) 13 - 56 U/L  
 AST (SGOT) 122 (H) 15 - 37 U/L Alk. phosphatase 51 45 - 117 U/L Protein, total 5.8 (L) 6.4 - 8.2 g/dL Albumin 2.0 (L) 3.4 - 5.0 g/dL Globulin 3.8 2.0 - 4.0 g/dL A-G Ratio 0.5 (L) 0.8 - 1.7 MAGNESIUM Collection Time: 10/27/18 12:29 AM  
Result Value Ref Range Magnesium 1.7 1.6 - 2.6 mg/dL Signed By: Carrie Cuevas MD   
 October 27, 2018

## 2018-10-27 NOTE — PROGRESS NOTES
Occupational Therapy Note: 
Orders received, chart reviewed and OT evaluation attempted. She is presently with multiple visitors; yenny f/sonia Cabral, OTR/L

## 2018-10-28 LAB
ALBUMIN SERPL-MCNC: 2.1 G/DL (ref 3.4–5)
ALBUMIN/GLOB SERPL: 0.6 {RATIO} (ref 0.8–1.7)
ALP SERPL-CCNC: 48 U/L (ref 45–117)
ALT SERPL-CCNC: 207 U/L (ref 13–56)
ANION GAP SERPL CALC-SCNC: 8 MMOL/L (ref 3–18)
AST SERPL-CCNC: 132 U/L (ref 15–37)
BILIRUB SERPL-MCNC: 0.5 MG/DL (ref 0.2–1)
BUN SERPL-MCNC: 48 MG/DL (ref 7–18)
BUN/CREAT SERPL: 31 (ref 12–20)
CALCIUM SERPL-MCNC: 7.9 MG/DL (ref 8.5–10.1)
CHLORIDE SERPL-SCNC: 105 MMOL/L (ref 100–108)
CO2 SERPL-SCNC: 23 MMOL/L (ref 21–32)
CREAT SERPL-MCNC: 1.54 MG/DL (ref 0.6–1.3)
GLOBULIN SER CALC-MCNC: 3.7 G/DL (ref 2–4)
GLUCOSE SERPL-MCNC: 144 MG/DL (ref 74–99)
POTASSIUM SERPL-SCNC: 4.9 MMOL/L (ref 3.5–5.5)
PROT SERPL-MCNC: 5.8 G/DL (ref 6.4–8.2)
SODIUM SERPL-SCNC: 136 MMOL/L (ref 136–145)

## 2018-10-28 PROCEDURE — 65660000000 HC RM CCU STEPDOWN

## 2018-10-28 PROCEDURE — 97535 SELF CARE MNGMENT TRAINING: CPT

## 2018-10-28 PROCEDURE — 74011250636 HC RX REV CODE- 250/636: Performed by: HOSPITALIST

## 2018-10-28 PROCEDURE — 74011250637 HC RX REV CODE- 250/637: Performed by: HOSPITALIST

## 2018-10-28 PROCEDURE — 80074 ACUTE HEPATITIS PANEL: CPT | Performed by: HOSPITALIST

## 2018-10-28 PROCEDURE — 80053 COMPREHEN METABOLIC PANEL: CPT | Performed by: HOSPITALIST

## 2018-10-28 PROCEDURE — 36415 COLL VENOUS BLD VENIPUNCTURE: CPT | Performed by: HOSPITALIST

## 2018-10-28 PROCEDURE — 97165 OT EVAL LOW COMPLEX 30 MIN: CPT

## 2018-10-28 RX ADMIN — FOLIC ACID 1 MG: 1 TABLET ORAL at 08:28

## 2018-10-28 RX ADMIN — LOSARTAN POTASSIUM 50 MG: 50 TABLET ORAL at 08:28

## 2018-10-28 RX ADMIN — CALCIUM CITRATE 200 MG (950 MG) TABLET 400 MG: at 08:28

## 2018-10-28 RX ADMIN — METHYLPREDNISOLONE SODIUM SUCCINATE 20 MG: 40 INJECTION, POWDER, FOR SOLUTION INTRAMUSCULAR; INTRAVENOUS at 08:28

## 2018-10-28 RX ADMIN — METOPROLOL TARTRATE 50 MG: 50 TABLET ORAL at 08:28

## 2018-10-28 RX ADMIN — APIXABAN 2.5 MG: 2.5 TABLET, FILM COATED ORAL at 08:28

## 2018-10-28 RX ADMIN — TAMOXIFEN CITRATE 20 MG: 10 TABLET, FILM COATED ORAL at 08:45

## 2018-10-28 RX ADMIN — FAMOTIDINE 20 MG: 20 TABLET ORAL at 08:28

## 2018-10-28 RX ADMIN — METOPROLOL TARTRATE 50 MG: 50 TABLET ORAL at 17:18

## 2018-10-28 RX ADMIN — APIXABAN 2.5 MG: 2.5 TABLET, FILM COATED ORAL at 17:18

## 2018-10-28 NOTE — PROGRESS NOTES
Problem: Self Care Deficits Care Plan (Adult) Goal: *Acute Goals and Plan of Care (Insert Text) Occupational Therapy Goals Initiated 10/28/2018 within 7 day(s). 1.  Patient will perform grooming with modified independence standing at sink 2. Patient will perform lower body dressing/bathing with modified independence. 3.  Patient will perform toilet transfers with modified independence. 4.  Patient will perform all aspects of toileting with modified independence. Outcome: Progressing Towards Goal 
Occupational Therapy EVALUATION Patient: Raúl Sifuentes (45 y.o. female) Date: 10/28/2018 Primary Diagnosis: Fluid overload Precautions:   Fall ASSESSMENT : 
Based on the objective data described below, the patient in bed upon arrival. Patient needed supervision with bed mobility and LE ADLs seated on EOB. Patient needed supervision to stand and SBA with O2 in place to perform functional mobility with rolling walker. Patient needed minimal cues for walker management in bathroom during toilet transfer and standing grooming tasks, which patient needed supervision/SBA. Patient needed encouragement during ADLs. Patient assisted to recliner with supervision. Educated patient on importance on ambulating with nursing and sitting up in recliner for meals. Patient educated on tub transfer bench for safety during bathing at home. Patients' family present and they reported they will be assisting patient with bathing for safety and IADLs post d/c. Patient will benefit from skilled intervention to address the above impairments. Patients rehabilitation potential is considered to be Good Factors which may influence rehabilitation potential include:  
[]             None noted []             Mental ability/status [x]             Medical condition []             Home/family situation and support systems []             Safety awareness []             Pain tolerance/management 
[]             Other: PLAN : 
Recommendations and Planned Interventions: 
[x]               Self Care Training                  [x]        Therapeutic Activities [x]               Functional Mobility Training    []        Cognitive Retraining 
[x]               Therapeutic Exercises           [x]        Endurance Activities [x]               Balance Training                   []        Neuromuscular Re-Education []               Visual/Perceptual Training     [x]   Home Safety Training 
[x]               Patient Education                 []        Family Training/Education []               Other (comment): Frequency/Duration: Patient will be followed by occupational therapy 1-2 times per day/4-7 days per week to address goals. Discharge Recommendations: Home Health with family assistance Further Equipment Recommendations for Discharge: tub transfer bench and rolling walker Barriers to Learning/Limitations: None PATIENT COMPLEXITY Eval Complexity: History: LOW Complexity : Brief history review ; Examination: LOW Complexity : 1-3 performance deficits relating to physical, cognitive , or psychosocial skils that result in activity limitations and / or participation restrictions ; Decision Making:LOW Complexity : No comorbidities that affect functional and no verbal or physical assistance needed to complete eval tasks  Assessment: Low Complexity G-CODES:  
 
Self Care  Current  CI= 1-19%  Goal  CI= 1-19%. The severity rating is based on the Level of Assistance required for Functional Mobility and ADLs. SUBJECTIVE:  
Patient stated Okay I understand.  OBJECTIVE DATA SUMMARY:  
 
Past Medical History:  
Diagnosis Date  Autoimmune disease ([facility] [address]  Breast cancer ([facility] [address] 2001, 1/ 2014  
 right, left  Cancer ([facility] [address]  Colon polyps  Elevated cholesterol  Enlarged lymph nodes   
 pericardium  GERD (gastroesophageal reflux disease)  Hypertension  Ill-defined condition 2007 to 2010  
 lung-ground glass followed by Dr. Cristofer Eaton  Insomnia  Lichen plano-pilaris  Mass of breast, right 1976  
 benign  Mass of breast, right 1994  
 benign  Pure hyperglyceridemia  Thromboembolus ([address] 09/2017  
 blood clot right leg  Thyroid disease Past Surgical History:  
Procedure Laterality Date  HX APPENDECTOMY  HX CATARACT REMOVAL Bilateral 2010  HX CHOLECYSTECTOMY  HX HEMORRHOIDECTOMY  HX HERNIA REPAIR  2004  
 small abdominal repair and fistula  HX HYSTERECTOMY  1991  
 cystocele  HX LYMPHADENECTOMY Right 7/2001  
 right axilla  HX MASTECTOMY Right 2001  
 with reconstruction  HX MASTECTOMY Left 1/14  HX POLYPECTOMY    
 [id number] muliple removals  HX RECTOCELE REPAIR  2003 Prior Level of Function/Home Situation: Patient reported she was independent in basic self care tasks and functional mobility PTA. Home Situation Home Environment: Private residence # Steps to Enter: 3 One/Two Story Residence: One story Living Alone: Yes Support Systems: Family member(s) Patient Expects to be Discharged to[de-identified] Unknown Current DME Used/Available at Home: Raised toilet seat, Shower chair, Grab bars Tub or Shower Type: Tub/Shower combination 
[x]  Right hand dominant   []  Left hand dominantCognitive/Behavioral Status: 
Neurologic State: Alert Orientation Level: Oriented X4 Cognition: Follows commands Skin: No skin changes noted Edema: No edema noted Vision/Perceptual:   
   Acuity: Within Defined Limits Coordination: 
Coordination: Within functional limits(BUEs) Balance: 
Sitting: Intact Standing: Impaired; With support Standing - Static: Good Standing - Dynamic : Fair Strength: 
Strength: Generally decreased, functional(BUEs) Tone & Sensation: 
Tone: Normal(BUEs) Sensation: Intact(BUEs) Range of Motion: 
AROM: Within functional limits(BUEs) Functional Mobility and Transfers for ADLs: 
Bed Mobility: 
Supine to Sit: Supervision Scooting: Supervision Transfers: 
Sit to Stand: Supervision Toilet Transfer : Stand-by assistance;Supervision(with rolling walker) ADL Assessment:(clinical judgement) Feeding: Independent Oral Facial Hygiene/Grooming: Supervision Bathing: Supervision;Stand-by assistance Upper Body Dressing: Independent Lower Body Dressing: Supervision;Stand-by assistance Toileting: Supervision Pain: 
Pt reports 0/10 pain or discomfort prior to treatment.   
Pt reports 2/10 pain or discomfort post treatment. (L knee) Activity Tolerance:  
Good Please refer to the flowsheet for vital signs taken during this treatment. After treatment:  
[x] Patient left in no apparent distress sitting up in chair 
[] Patient left in no apparent distress in bed 
[x] Call bell left within reach [x] Nursing notified 
[x] Caregiver present 
[] Bed alarm activated COMMUNICATION/EDUCATION:  
[] Home safety education was provided and the patient/caregiver indicated understanding. [x] Patient/family have participated as able in goal setting and plan of care. [x] Patient/family agree to work toward stated goals and plan of care. [] Patient understands intent and goals of therapy, but is neutral about his/her participation. [] Patient is unable to participate in goal setting and plan of care. Thank you for this referral. 
Hiral Romero OTR/L Time Calculation: 57 mins

## 2018-10-28 NOTE — ROUTINE PROCESS
Report received from [address] [place] [address]. Patient is alert, in bed, no distress noted. Call bell in reach. 0000  Patient assisted to bsc then back to bed. No distress noted. 0730  Bedside, Verbal and Written shift change report given to Sinai Rivera RN (oncoming nurse) by Jaiden Carbajal RN (offgoing nurse). Report included the following information SBAR, Kardex, MAR and Accordion.

## 2018-10-28 NOTE — ROUTINE PROCESS
Bedside and Verbal shift change report given to Lucas Willis (oncoming nurse) by Joe Cross. Mat Sears (offgoing nurse). Report included the following information Kardex, Intake/Output and MAR.

## 2018-10-28 NOTE — PROGRESS NOTES
Met with pt and she states she wants to go for rehab and she wants it to be Acute Rehab Unit downstairs. Pt informed that it it not guaranteed ARU will accept. Left a list of Skilled Nursing Facilities with her to pick 3 choices. 1630:  OT is recommending home health. Pt lives alone. Pt states her children live in [place] but hard for them to leave their own family and care for her. She states it is hard for her to take her medicines, do groceries and cook and care for herself. Pt's son Arlene Leon and his wife from [place] at pt's bedside and states they are planning for pt to go long term in the future and her other brother is working on it. Will continue to follow.

## 2018-10-28 NOTE — PROGRESS NOTES
[facility] Progress Note Patient: Judith Whiting Age: 80 y.o. : 1932 MR#: [id number] SSN: [id number] Date/Time: 10/28/2018 Subjective: pt feels lot better, ambulating with walker. Family at bedside Assessment/Plan: 1. Pulmonary fibrosis with ? Flare: will taper steroids, BD and o2. Wean off O2  
2. Hyponatremia: will monitor ? Lasix related. Better 3. Acute debility due to multiple co morbidities: cont PT/OT 4. 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 5. Essential hypertension: BP stable 5. Mild abnormal LFT's: ? Related med's statin and tamoxifen, hold statin and stop tamoxifen. Will get hep panel and liver US  
6. Hyperlipidemia: hold statin 7. PMR (polymyalgia rheumatica): cont supportive crae   
8. History of breast cancer:  
9. Hx DVT on eliquis Repeat labs now and in am  
DNR. D/w pt in detail D/w Monica Alfonso [phone]. D/w  Discharge in am: [facility] vs SNF Case discussed with:  [x]Patient  [x]Family  [x]Nursing  []Case Management DVT Prophylaxis:  [x]eliquis  []Hep SQ  []SCDs  []Coumadin   []On Heparin gtt Objective:  
VS:  
Visit Vitals /70 (BP 1 Location: Left arm, BP Patient Position: Sitting) Pulse (!) 53 Temp 96.6 °F (35.9 °C) Resp 18 Wt 60.6 kg (133 lb 8 oz) SpO2 99% Breastfeeding? No  
BMI 26.07 kg/m² Tmax/24hrs: Temp (24hrs), Av.5 °F (36.4 °C), Min:96.6 °F (35.9 °C), Max:98.2 °F (36.8 °C) IOBRIEF Intake/Output Summary (Last 24 hours) at 10/28/2018 1151 Last data filed at 10/28/2018 1753 Gross per 24 hour Intake 420 ml Output 1100 ml Net -680 ml General:  Alert, cooperative, no acute distress   
Pulmonary: Bilaterally Velcro rales, improving. No Wheezing. Cardiovascular: Regular rate and Rhythm. No JVD 
GI:  Soft, Non distended, Non tender. + Bowel sounds. Extremities: No edema, cyanosis, clubbing. Psych: Good insight. Not anxious or agitated. Neurologic: Alert and oriented X 3. No acute neuro deficits. Medications:  
Current Facility-Administered Medications Medication Dose Route Frequency  [START ON 10/29/2018] predniSONE (DELTASONE) tablet 40 mg  40 mg Oral DAILY WITH BREAKFAST  calcium citrate tablet 400 mg [elemental]  400 mg Oral DAILY  famotidine (PEPCID) tablet 20 mg  20 mg Oral DAILY  apixaban (ELIQUIS) tablet 2.5 mg  2.5 mg Oral BID  folic acid (FOLVITE) tablet 1 mg  800 mcg Oral DAILY  levothyroxine (SYNTHROID) tablet 100 mcg  100 mcg Oral QHS  losartan (COZAAR) tablet 50 mg  50 mg Oral DAILY  metoprolol tartrate (LOPRESSOR) tablet 50 mg  50 mg Oral BID  acetaminophen (TYLENOL) tablet 650 mg  650 mg Oral Q4H PRN  
 naloxone (NARCAN) injection 0.4 mg  0.4 mg IntraVENous PRN  
 ondansetron (ZOFRAN) injection 4 mg  4 mg IntraVENous Q4H PRN Labs:   
Recent Results (from the past 24 hour(s)) METABOLIC PANEL, COMPREHENSIVE Collection Time: 10/28/18  4:00 AM  
Result Value Ref Range Sodium 136 136 - 145 mmol/L Potassium 4.9 3.5 - 5.5 mmol/L Chloride 105 100 - 108 mmol/L  
 CO2 23 21 - 32 mmol/L Anion gap 8 3.0 - 18 mmol/L Glucose 144 (H) 74 - 99 mg/dL BUN 48 (H) 7.0 - 18 MG/DL Creatinine 1.54 (H) 0.6 - 1.3 MG/DL  
 BUN/Creatinine ratio 31 (H) 12 - 20 GFR est AA 39 (L) >60 ml/min/1.73m2 GFR est non-AA 32 (L) >60 ml/min/1.73m2 Calcium 7.9 (L) 8.5 - 10.1 MG/DL Bilirubin, total 0.5 0.2 - 1.0 MG/DL  
 ALT (SGPT) 207 (H) 13 - 56 U/L  
 AST (SGOT) 132 (H) 15 - 37 U/L Alk. phosphatase 48 45 - 117 U/L Protein, total 5.8 (L) 6.4 - 8.2 g/dL Albumin 2.1 (L) 3.4 - 5.0 g/dL Globulin 3.7 2.0 - 4.0 g/dL A-G Ratio 0.6 (L) 0.8 - 1.7 Signed By: Ruthy Norman MD   
 October 28, 2018

## 2018-10-29 ENCOUNTER — APPOINTMENT (OUTPATIENT)
Dept: ULTRASOUND IMAGING | Age: 83
DRG: 197 | End: 2018-10-29
Attending: HOSPITALIST
Payer: MEDICARE

## 2018-10-29 VITALS
TEMPERATURE: 98.8 F | WEIGHT: 133.1 LBS | BODY MASS INDEX: 25.99 KG/M2 | DIASTOLIC BLOOD PRESSURE: 63 MMHG | RESPIRATION RATE: 20 BRPM | SYSTOLIC BLOOD PRESSURE: 143 MMHG | OXYGEN SATURATION: 94 % | HEART RATE: 68 BPM

## 2018-10-29 LAB
HAV IGM SER QL: NEGATIVE
HBV CORE IGM SER QL: NEGATIVE
HBV SURFACE AG SER QL: <0.1 INDEX
HBV SURFACE AG SER QL: NEGATIVE
HCV AB SER IA-ACNC: 0.09 INDEX
HCV AB SERPL QL IA: NEGATIVE
HCV COMMENT,HCGAC: NORMAL
SP1: NORMAL
SP2: NORMAL
SP3: NORMAL

## 2018-10-29 PROCEDURE — 76705 ECHO EXAM OF ABDOMEN: CPT

## 2018-10-29 PROCEDURE — 74011250637 HC RX REV CODE- 250/637: Performed by: HOSPITALIST

## 2018-10-29 PROCEDURE — 97116 GAIT TRAINING THERAPY: CPT

## 2018-10-29 PROCEDURE — 74011636637 HC RX REV CODE- 636/637: Performed by: HOSPITALIST

## 2018-10-29 PROCEDURE — 97530 THERAPEUTIC ACTIVITIES: CPT

## 2018-10-29 RX ORDER — LOSARTAN POTASSIUM 50 MG/1
100 TABLET ORAL DAILY
Status: DISCONTINUED | OUTPATIENT
Start: 2018-10-30 | End: 2018-10-29 | Stop reason: HOSPADM

## 2018-10-29 RX ORDER — LOSARTAN POTASSIUM 50 MG/1
50 TABLET ORAL
Status: COMPLETED | OUTPATIENT
Start: 2018-10-29 | End: 2018-10-29

## 2018-10-29 RX ORDER — PREDNISONE 10 MG/1
TABLET ORAL
Qty: 18 TAB | Refills: 0 | Status: SHIPPED | OUTPATIENT
Start: 2018-10-29 | End: 2019-01-03 | Stop reason: ALTCHOICE

## 2018-10-29 RX ORDER — HYDRALAZINE HYDROCHLORIDE 20 MG/ML
10 INJECTION INTRAMUSCULAR; INTRAVENOUS
Status: DISCONTINUED | OUTPATIENT
Start: 2018-10-29 | End: 2018-10-29 | Stop reason: HOSPADM

## 2018-10-29 RX ADMIN — APIXABAN 2.5 MG: 2.5 TABLET, FILM COATED ORAL at 10:16

## 2018-10-29 RX ADMIN — FAMOTIDINE 20 MG: 20 TABLET ORAL at 10:16

## 2018-10-29 RX ADMIN — LOSARTAN POTASSIUM 50 MG: 50 TABLET ORAL at 10:16

## 2018-10-29 RX ADMIN — METOPROLOL TARTRATE 50 MG: 50 TABLET ORAL at 10:16

## 2018-10-29 RX ADMIN — LOSARTAN POTASSIUM 50 MG: 50 TABLET ORAL at 14:48

## 2018-10-29 RX ADMIN — METOPROLOL TARTRATE 50 MG: 50 TABLET ORAL at 17:44

## 2018-10-29 RX ADMIN — LEVOTHYROXINE SODIUM 100 MCG: 100 TABLET ORAL at 00:12

## 2018-10-29 RX ADMIN — APIXABAN 2.5 MG: 2.5 TABLET, FILM COATED ORAL at 17:44

## 2018-10-29 RX ADMIN — FOLIC ACID 1 MG: 1 TABLET ORAL at 10:16

## 2018-10-29 RX ADMIN — PREDNISONE 40 MG: 20 TABLET ORAL at 10:16

## 2018-10-29 RX ADMIN — CALCIUM CITRATE 200 MG (950 MG) TABLET 400 MG: at 10:16

## 2018-10-29 NOTE — ROUTINE PROCESS
Bedside and Verbal shift change report given to [address] [place] [address] (oncoming nurse) by Merly Keys RN (offgoing nurse). Report included the following information SBAR, Kardex, ED Summary, Recent Results and Med Rec Status. Patient went off the floor to ultrasound.

## 2018-10-29 NOTE — PROGRESS NOTES
Problem: Mobility Impaired (Adult and Pediatric) Goal: *Acute Goals and Plan of Care (Insert Text) Physical Therapy Goals Initiated 10/27/2018 and to be accomplished within 7 day(s) 1. Patient will move from supine to sit and sit to supine  in bed with modified independence. 2.  Patient will transfer from bed to chair and chair to bed with modified independence using the least restrictive device. 3.  Patient will perform sit to stand with modified independence. 4.  Patient will ambulate with modified independence for 300 feet with the least restrictive device. 5.  Patient will ascend/descend 4 stairs with 1 handrail(s) with supervision/set-up. Outcome: Progressing Towards Goal 
physical Therapy TREATMENT Patient: Jann Harvey (71 y.o. female) Date: 10/29/2018 Diagnosis: Fluid overload Pulmonary fibrosis (HCC) Precautions: Fall Chart, physical therapy assessment, plan of care and goals were reviewed. OBJECTIVE/ ASSESSMENT: 
Nursing cleared pt to participate with PT. Patient found semi reclined in bed willing to work with PT with max encouragement from this LPTA and pt's son who was present during tx. Pt transferred to EOB with S and stated she needed to use the bathroom prior to ambulating into hallway. Pt stood to RW and ambulated 10ft into bathroom. Pt performed independent toileting. Pt then adamantly declined further gait training despite max encouragement and stated several times \"I want to go back to bed. \" Pt declined dizziness or pain and was unable to explain why she wanted to go back to bed. Pt required max encouragement to wash hands prior to returning to bed. Pt demonstrated good static standing balance while at sink during hand washing. Pt returned to EOB close to foot of bed and laid herself down flat across bed without being cued to do so. Pt initially refuses to sit up in order to position herself properly in bed.  After max encouragement pt sat up with Min A. Pt scooted along EOB towards HOB and then returned to supine with Supervision. Pt was left comfortably with all needs in reach, son present, and nursing notified. Education: importance of OOB activity, transfers, gait, role of PT, bed mobility Progression toward goals: 
[x]      Improving appropriately and progressing toward goals 
[]      Improving slowly and progressing toward goals 
[]      Not making progress toward goals and plan of care will be adjusted PLAN: 
Patient continues to benefit from skilled intervention to address the above impairments. Continue treatment per established plan of care. Discharge Recommendations:  Home Health vs SNF Further Equipment Recommendations for Discharge:  RW  
 
SUBJECTIVE:  
Patient stated I want to go back to bed. I want to go back to bed. I need to go back to bed.  OBJECTIVE DATA SUMMARY:  
Functional Mobility Training: 
Bed Mobility: 
 Supine to Sit: Supervision Sit to Supine: Supervision Scooting: Supervision Transfers: 
Sit to Stand: Supervision Stand to Sit: Supervision Balance: 
Sitting: Intact Standing: Impaired; With support Standing - Static: Good Standing - Dynamic : FairAmbulation/Gait Training: 
Distance (ft): 20 Feet (ft) Assistive Device: Walker, rolling Ambulation - Level of Assistance: Stand-by assistance Gait Abnormalities: Decreased step clearance Base of Support: Center of gravity altered Interventions: Verbal cues Pain: 
Pre tx pain: 0/10Post tx pain: 0/10Activity Tolerance:  
fair Please refer to the flowsheet for vital signs taken during this treatment. After treatment:  
[] Patient left in no apparent distress sitting up in chair 
[x] Patient left in no apparent distress in bed 
[x] Call bell left within reach [x] Nursing notified Jake Maddox [x] Son  present 
[] Bed alarm activated 
[] SCDs applied  
[] Ice applied Jacqueline [facility] [place] Time Calculation: 28 mins Mobility [id number] Current  CI= 1-19%. The severity rating is based on the Level of Assistance required for Functional Mobility and ADLs. Mobility   Goal  CI= 1-19%. The severity rating is based on the Level of Assistance required for Functional Mobility and ADLs.

## 2018-10-29 NOTE — DISCHARGE INSTRUCTIONS
DISCHARGE SUMMARY from Nurse    PATIENT INSTRUCTIONS:    After general anesthesia or intravenous sedation, for 24 hours or while taking prescription Narcotics:  Limit your activities  Do not drive and operate hazardous machinery  Do not make important personal or business decisions  Do  not drink alcoholic beverages  If you have not urinated within 8 hours after discharge, please contact your surgeon on call. Report the following to your surgeon:  Excessive pain, swelling, redness or odor of or around the surgical area  Temperature over 100.5  Nausea and vomiting lasting longer than 4 hours or if unable to take medications  Any signs of decreased circulation or nerve impairment to extremity: change in color, persistent  numbness, tingling, coldness or increase pain  Any questions    What to do at Home:  Recommended activity: Activity as tolerated,     If you experience any of the following symptoms chest pain, dizziness, please call 911. *  Please give a list of your current medications to your Primary Care Provider. *  Please update this list whenever your medications are discontinued, doses are      changed, or new medications (including over-the-counter products) are added. *  Please carry medication information at all times in case of emergency situations. These are general instructions for a healthy lifestyle:    No smoking/ No tobacco products/ Avoid exposure to second hand smoke  Surgeon General's Warning:  Quitting smoking now greatly reduces serious risk to your health.     Obesity, smoking, and sedentary lifestyle greatly increases your risk for illness    A healthy diet, regular physical exercise & weight monitoring are important for maintaining a healthy lifestyle    You may be retaining fluid if you have a history of heart failure or if you experience any of the following symptoms:  Weight gain of 3 pounds or more overnight or 5 pounds in a week, increased swelling in our hands or feet or shortness of breath while lying flat in bed. Please call your doctor as soon as you notice any of these symptoms; do not wait until your next office visit. Recognize signs and symptoms of STROKE:    F-face looks uneven    A-arms unable to move or move unevenly    S-speech slurred or non-existent    T-time-call 911 as soon as signs and symptoms begin-DO NOT go       Back to bed or wait to see if you get better-TIME IS BRAIN. Warning Signs of HEART ATTACK     Call 911 if you have these symptoms:  Chest discomfort. Most heart attacks involve discomfort in the center of the chest that lasts more than a few minutes, or that goes away and comes back. It can feel like uncomfortable pressure, squeezing, fullness, or pain. Discomfort in other areas of the upper body. Symptoms can include pain or discomfort in one or both arms, the back, neck, jaw, or stomach. Shortness of breath with or without chest discomfort. Other signs may include breaking out in a cold sweat, nausea, or lightheadedness. Don't wait more than five minutes to call 911 - MINUTES MATTER! Fast action can save your life. Calling 911 is almost always the fastest way to get lifesaving treatment. Emergency Medical Services staff can begin treatment when they arrive -- up to an hour sooner than if someone gets to the hospital by car. The discharge information has been reviewed with the {PATIENT PARENT GUARDIAN:96553}. The {PATIENT PARENT GUARDIAN:76075} verbalized understanding. Discharge medications reviewed with the {Dishcarge meds reviewed YECE:42213} and appropriate educational materials and side effects teaching were provided.   ___________________________________________________________________________________________________________________________________     Preventing Falls: Care Instructions  Your Care Instructions    Getting around your home safely can be a challenge if you have injuries or health problems that make it easy for you to fall. Loose rugs and furniture in walkways are among the dangers for many older people who have problems walking or who have poor eyesight. People who have conditions such as arthritis, osteoporosis, or dementia also have to be careful not to fall. You can make your home safer with a few simple measures. Follow-up care is a key part of your treatment and safety. Be sure to make and go to all appointments, and call your doctor if you are having problems. It's also a good idea to know your test results and keep a list of the medicines you take. How can you care for yourself at home? Taking care of yourself  · You may get dizzy if you do not drink enough water. To prevent dehydration, drink plenty of fluids, enough so that your urine is light yellow or clear like water. Choose water and other caffeine-free clear liquids. If you have kidney, heart, or liver disease and have to limit fluids, talk with your doctor before you increase the amount of fluids you drink. · Exercise regularly to improve your strength, muscle tone, and balance. Walk if you can. Swimming may be a good choice if you cannot walk easily. · Have your vision and hearing checked each year or any time you notice a change. If you have trouble seeing and hearing, you might not be able to avoid objects and could lose your balance. · Know the side effects of the medicines you take. Ask your doctor or pharmacist whether the medicines you take can affect your balance. Sleeping pills or sedatives can affect your balance. · Limit the amount of alcohol you drink. Alcohol can impair your balance and other senses. · Ask your doctor whether calluses or corns on your feet need to be removed. If you wear loose-fitting shoes because of calluses or corns, you can lose your balance and fall. · Talk to your doctor if you have numbness in your feet. Preventing falls at home  · Remove raised doorway thresholds, throw rugs, and clutter.  Repair loose carpet or raised areas in the floor. · Move furniture and electrical cords to keep them out of walking paths. · Use nonskid floor wax, and wipe up spills right away, especially on ceramic tile floors. · If you use a walker or cane, put rubber tips on it. If you use crutches, clean the bottoms of them regularly with an abrasive pad, such as steel wool. · Keep your house well lit, especially [place], and outside walkways. Use night-lights in areas such as hallways and bathrooms. Add extra light switches or use remote switches (such as switches that go on or off when you clap your hands) to make it easier to turn lights on if you have to get up during the night. · Install sturdy handrails on stairways. · Move items in your cabinets so that the things you use a lot are on the lower shelves (about waist level). · Keep a cordless phone and a flashlight with new batteries by your bed. If possible, put a phone in each of the main rooms of your house, or carry a cell phone in case you fall and cannot reach a phone. Or, you can wear a device around your neck or wrist. You push a button that sends a signal for help. · Wear low-heeled shoes that fit well and give your feet good support. Use footwear with nonskid soles. Check the heels and soles of your shoes for wear. Repair or replace worn heels or soles. · Do not wear socks without shoes on wood floors. · Walk on the grass when the sidewalks are slippery. If you live in an area that gets snow and ice in the winter, sprinkle salt on slippery steps and sidewalks. Preventing falls in the bath  · Install grab bars and nonskid mats inside and outside your shower or tub and near the toilet and sinks. · Use shower chairs and bath benches. · Use a hand-held shower head that will allow you to sit while showering.   · Get into a tub or shower by putting the weaker leg in first. Get out of a tub or shower with your strong side first.  · Repair loose toilet seats and consider installing a raised toilet seat to make getting on and off the toilet easier. · Keep your bathroom door unlocked while you are in the shower. Where can you learn more? Go to http://crystal-gemma.info/. Enter 0476 [id number] 69 [phone] in the search box to learn more about \"Preventing Falls: Care Instructions. \"  Current as of: March 16, 2018  Content Version: 11.8  © 6545-9870 [employer]. Care instructions adapted under license by [employer] (which disclaims liability or warranty for this information). If you have questions about a medical condition or this instruction, always ask your healthcare professional. [place] [address] any warranty or liability for your use of this information. Learning About Diuretics for High Blood Pressure  Introduction  Diuretics help to lower blood pressure. This reduces your risk of a heart attack and stroke. It also reduces your risk of kidney disease. Diuretics cause your kidneys to remove sodium and water. They also relax the blood vessel walls. These help lower your blood pressure. Examples  · Chlorthalidone  · Hydrochlorothiazide  Possible side effects  There are some common side effects. They are:  · Too little potassium. · Feeling dizzy. · Rash. · Urinating a lot. · High blood sugar. (But this is not common.)  You may have other side effects. Check the information that comes with your medicine. What to know about taking this medicine  · You may take other medicines for blood pressure. Diuretics can help those work better. They can also prevent extra fluid in your body. · You may need to take potassium pills. Or you may have to watch how much potassium is in your food. Ask your doctor about this. · You may need blood tests to check your kidneys and your potassium level. · Take your medicines exactly as prescribed. Call your doctor if you think you are having a problem with your medicine.   · Check with your doctor or pharmacist before you use any other medicines. This includes over-the-counter medicines. Make sure your doctor knows all of the medicines, vitamins, herbal products, and supplements you take. Taking some medicines together can cause problems. Where can you learn more? Go to http://crystal-gemma.info/. Enter Y993 in the search box to learn more about \"Learning About Diuretics for High Blood Pressure. \"  Current as of: December 6, 2017  Content Version: 11.8  © 1640-1633 [employer]. Care instructions adapted under license by [employer] (which disclaims liability or warranty for this information). If you have questions about a medical condition or this instruction, always ask your healthcare professional. [address] any warranty or liability for your use of this information.

## 2018-10-29 NOTE — PROGRESS NOTES
Patient discharged via EMS with son at bedside; medication list reviewed using teach-back method, no further questions at this time; monitor returned; skin is intact; vitals stable; pain denied; belongings and discharge paperwork left with patient

## 2018-10-29 NOTE — PROGRESS NOTES
Asked to comment on US result by Dr Osbaldo Means. Full consult not requested. I suspect the stable pneumobilia is the result of a prior sphincterotomy. This was not done by our group and no records are available to document this . Her liver enz elevations are hepatitic not cholestatic and standard work up is reasonable . If trend continues to be upward then we will need to see her as an out pt. All of this was discussed with Dr Osbaldo Means. Call if you have further questions or concerns .

## 2018-10-29 NOTE — DISCHARGE SUMMARY
[address] SUMMARY    Jonathan Nixon  MR#: [id number]  : 1932  ACCOUNT #: [de-identified]   ADMIT DATE: 10/25/2018  DISCHARGE DATE: 10/29/2018    PRIMARY CARE PHYSICIAN:  Federica Dawkins MD    DISPOSITION:  Transferred to skilled nursing home. DISCHARGE CONDITION:  Stable. DISCHARGE DIAGNOSES:  1. Pulmonary fibrosis with acute flare, improved now,   2. hyponatremia secondary to diuretics, improved now. 2.  Acute debility due to multiple comorbidities improved now. 3.  Chronic kidney disease stage III, creatinine at baseline. 4.  Hypertension. 5.  Abnormal LFTs most likely related to medications, statin and tamoxifen, which have been held. Hepatitis panel negative. 6.  Dyslipidemia. 7.  Polymyalgia rheumatica. 8.  History of breast cancer in remission. 9.  History of deep vein thrombosis in the past on Eliquis. 10. Pneumobilia, pt asymptomatic      DISCHARGE MEDICATIONS:    1. Centrum Silver multivitamin 1 tablet daily. 2.  Calcitriol with D 2 tablets twice daily. 3.  Olux topical foam apply twice daily on affected area of skin irritation. 4.  Vitamin B12 1000 mcg daily. 5.  Bentyl 10 mg t.i.d. with meals. 6.  Doxepin 25 mg at bedtime. 7.  Eliquis 2.5 mg b.i.d.  8.  Folic acid 432 mcg daily. 9.  Lasix 20 mg daily. 10.  Synthroid 100 mcg daily. 11.  Claritin 10 mg daily. 12.  Cozaar 100 mg daily. 13.  Metoprolol 50 mg b.i.d.  14.  Prednisone taper as directed. 13.   Zantac 300 mg daily. 14.  Systane ophthalmic drops as needed. 15.  Triamcinolone topical cream as directed. Hospital course:  patient was admitted for Gen weakness, multifactorial etiology. She was also SOB and thought to have pul fibrosis excerebration, started on IV steroids and O2. Patient responded well with steroids. There were no sign sof infection, did not get any antibiotics in hospital.     The patient with the steroids and BD symptoms resolved.   She is feeling much better. She did not have any fevers in the hospital. Her weakness and debility was thought to be multifactorial including underlying medical diseases. With the steroids her shortness of breath improved. She started moving much better. She was seen by physical therapy who recommended home health with a 24-hour assistance or a skilled nursing placement. The patient lives with herself so talked to the family including her son. They prefer the patient going to the SNF. In the hospital, the patient was noted to have abnormal LFTs, so statins were held. She was continued on tamoxifen, but LFTs continued to trend up. A hepatic panel was done which was negative. It was thought that the tamoxifen could also be the cause for the abnormal LFTs, which has been held. I personally discussed with the patient's oncologist, Dr. Edward Barry discussed with him about holding the tamoxifen. He agreed with the plan. He will see the patient in 2 weeks. he will repeat labs. USG abd was done, showed pneumobilia. D/w radiology, no change from pervious [place] in 3/2018. Pt  Asymptomatic and no N/V or RUQ pain. D/w GI Caitlin Wasserman, recommends out patient monitoring, if any fevers or abd pain then recommend CT. GI ok for discharge and see out patient if needed. Currently, patient asymptomatic, will be transferred to the skilled nursing home care. I have discussed with pt and family son at bedside in detail about US results and follow up. RN told me that pt talked about dying, d/w pt, she says she is tired of life, feels ready to go to [facility]. She denies any suicidal or homicidal ideation. D/w family, they are in agreement with transfer plan. DISCHARGE INSTRUCTIONS FOLLOWUP APPOINTMENTS AND PHYSICAL EXAMINATION:  Please refer to the previous discharge summary. The patient is alert, awake and oriented x3.   I discussed with the patient and also with her son at the bedside about the discharge planning and followup appointments and both of them completely understood the plan of care. I answered all of their questions and concerns at the bedside appropriately.       Norma Pruitt MD       BT/LUCIANA  D: 10/29/2018 14:20     T: 10/29/2018 15:14  JOB #: [id number]  CC: Keren Eisenberg MD  CC: Tyra Ware MD  CC: Karin Moreira MD

## 2018-10-29 NOTE — PROGRESS NOTES
Important Message from [id number] [facility]" reviewed and explained with the patient and/or representative at bedside and signature was obtained. A signed copy provided to patient/representative. Original signed document placed in patient's chart.

## 2018-10-29 NOTE — ROUTINE PROCESS
Bedside and Verbal shift change report given to Lady PORTER (oncoming nurse) by Manish Nolan (offgoing nurse). Report included the following information SBAR, Kardex, ED Summary and Recent Results. Patient resting in bed quietly watching TV.

## 2018-10-29 NOTE — PROGRESS NOTES
Problem: Falls - Risk of 
Goal: *Absence of Falls Document Tania Rg Fall Risk and appropriate interventions in the flowsheet. Outcome: Progressing Towards Goal 
Fall Risk Interventions: 
Mobility Interventions: Patient to call before getting OOB Elimination Interventions: Call light in reach History of Falls Interventions: Bed/chair exit alarm Problem: Pressure Injury - Risk of 
Goal: *Prevention of pressure injury Document Tyrone Scale and appropriate interventions in the flowsheet. Outcome: Progressing Towards Goal 
Pressure Injury Interventions: 
Sensory Interventions: Keep linens dry and wrinkle-free Moisture Interventions: Minimize layers Activity Interventions: Increase time out of bed Mobility Interventions: HOB 30 degrees or less Nutrition Interventions: Document food/fluid/supplement intake

## 2018-10-29 NOTE — PROGRESS NOTES
Notified MD Dr. Ioana Antonio regarding ultrasound result; patient also stated to MELODY Cordero and myself she is depressed and \"I would be happier if I was dead\"

## 2018-10-29 NOTE — DISCHARGE SUMMARY
Transfer Summary    Patient: Silvana Mayfield MRN: [id number]  CSN: [id number]    YOB: 1932  Age: 80 y.o. Sex: female    DOA: 10/25/2018 LOS:  LOS: 4 days   Discharge Date:      Disposition: Transfer to SNF    Admission Diagnoses: Fluid overload    Discharge Diagnoses:  Please see Dictation. Discharge Condition: Stable    PHYSICAL EXAM  Visit Vitals  /61 (BP 1 Location: Left arm, BP Patient Position: At rest)   Pulse 84   Temp 98.4 °F (36.9 °C)   Resp 20   Wt 60.4 kg (133 lb 1.6 oz)   SpO2 94%   Breastfeeding? No   BMI 25.99 kg/m²       General: Alert, cooperative, no acute distress    Lungs:  Bilaterally basal Velcro rales. No crackles or wheezing   Heart:             Regular rate and Rhythm. Abdomen: Soft, Non distended, Non tender. + Bowel sounds. Extremities: No edema or cyanosis   Psych:   Good insight. Not anxious or agitated. Neurologic:  AA, oriented X 3. Motor strength 5/5 all ext                                 Hospital Course: Please see dictation. code #     Discharge Medications:     Current Discharge Medication List      CONTINUE these medications which have CHANGED    Details   predniSONE (DELTASONE) 10 mg tablet Prednisone 10 mg tabs: p.o.  3 tabs daily for 3 days then drop to   2 tabs daily for 3 days then drop to   1 tabs daily for 3 days then stop.     Dispense 18 tabs  Qty: 18 Tab, Refills: 0         CONTINUE these medications which have NOT CHANGED    Details   metoprolol tartrate (LOPRESSOR) 50 mg tablet TAKE 1 TABLET BY MOUTH TWO  TIMES DAILY  Qty: 180 Tab, Refills: 3      losartan (COZAAR) 100 mg tablet TAKE 1 TABLET BY MOUTH  DAILY  Qty: 90 Tab, Refills: 2    Associated Diagnoses: Leg edema, right; Saphenofemoral venous reflux      ELIQUIS 2.5 mg tablet TAKE 1 TABLET BY MOUTH TWO  TIMES DAILY  Qty: 180 Tab, Refills: 3      dicyclomine (BENTYL) 10 mg capsule 1 three times per day (with each meal)  Qty: 90 Cap, Refills: 6      furosemide (LASIX) 20 mg tablet 1 tablet each AM  Indications: patient states she is taking 1 and 1/2 tablet  Qty: 135 Tab, Refills: 3      triamcinolone acetonide (KENALOG) 0.1 % topical cream Apply daily  Qty: 45 g, Refills: 1      CALCIUM CITRATE/VITAMIN D3 (CITRACAL + D MAXIMUM PO) Take 2 Tabs by mouth daily. folic acid 193 mcg tablet Take 800 mcg by mouth daily. raNITIdine (ZANTAC) 300 mg tablet Take 1 Tab by mouth daily. Qty: 90 Tab, Refills: 3    Comments: Please void rx for capsules. doxepin (SINEQUAN) 25 mg capsule Take 1 Cap by mouth nightly. Qty: 90 Cap, Refills: 3      levothyroxine (SYNTHROID) 100 mcg tablet Take 1 Tab by mouth nightly. Qty: 90 Tab, Refills: 3      peg 400-propylene glycol (SYSTANE, PROPYLENE GLYCOL,) 0.4-0.3 % drop Administer 1 Drop to both eyes as needed. Associated Diagnoses: Essential hypertension; Primary osteoarthritis involving multiple joints      cyanocobalamin 1,000 mcg tablet Take 1,000 mcg by mouth daily. Associated Diagnoses: Idiopathic gout, unspecified chronicity, unspecified site      multivitamins-minerals-lutein (CENTRUM SILVER) tab tablet Take 1 Tab by mouth daily. loratadine (CLARITIN) 10 mg tablet Take 10 mg by mouth daily. clobetasol (OLUX) 0.05 % topical foam Apply  to affected area two (2) times daily as needed for Skin Irritation. use thin film on affected area         STOP taking these medications       guanFACINE IR (TENEX) 1 mg IR tablet Comments:   Reason for Stopping:         simvastatin (ZOCOR) 20 mg tablet Comments:   Reason for Stopping:         GLUCOSAMINE HCL/CHONDR LEGER A NA (OSTEO BI-FLEX PO) Comments:   Reason for Stopping:         tamoxifen (NOLVADEX) 20 mg tablet Comments:   Reason for Stopping:               DIET:  Cardiac Diet    ACTIVITY: Activity as tolerated  Patient needs to be on Fall, aspiration, decubitus precaution.   ·    PT/OT consult  ·             DVT prophylaxis     ADDITIONAL INFORMATION: If you experience any of the following symptoms but not limited to Fever, chills, nausea, vomiting, diarrhea, change in mentation, falling, bleeding, shortness of breath, chest pain, please call your primary care physician or return to the emergency room if you cannot get hold of your doctor:     FOLLOW UP CARE:  Follow-up with 1. Physician at SNF in 1-2 days with Cbc with diff, CMP in 1 week.                              2. Dr. Stephanie Berry in 2 week                           3. Dr. Ana Darnell in 3 week    Pt's PCP: William Haines MD.    Minutes spent on discharge: >40 minutes spent coordinating this discharge (review instructions/follow-up, prescriptions, preparing report for sign off)    Ksenia Cruz MD  10/29/2018 2:21 PM

## 2018-10-29 NOTE — PROGRESS NOTES
Too Avila called [facility] scheduled 5:30 pm transport to [facility] [phone] before transport. Informed Thony Avila of transportation arrangements.

## 2018-10-29 NOTE — PROGRESS NOTES
ARU/IPR REFERRAL CONTACT NOTE [id number] [place] [address] for Physical Rehabilitation RE: Fazrad Macias Thank you for the opportunity to review this patient's case for admission to [address] [place] [address] for Physical Rehabilitation. Based on our pre-admission screening:  
 
[x ] This patient does not meet criteria for admission to [facility] for Physical Rehabilitation due to: 
 
[x ] Too functional, per documentation, patient does not require both Physical and Occupational Therapy Services at an acute rehabilitation level of intensity. Patient also does not have a rehab diagnosis. [x ] We recommend the following: 
[x ] Pulmonary Rehab Again, Thank you for this referral. Should you have any questions please do not hesitate to call. Sincerely, Millie Weldon [facility] 
[phone]

## 2018-10-29 NOTE — PROGRESS NOTES
Pt's son Hira Moore signed [place] of Choice for [facility] and [facility] and pt insisting she wants to go to Acute Rehab Unit. Informed Millie of ACU. Waiting for PT/OT notes to upload info to SNF. Informed Dr. Walt Giraldo. 1130: [facility] per [facility], pt does not meet ARU criteria. 1315:  Info has been uploaded to [address] [place] [address] via [place]. Called PJ of Clarisse STEELE'  for [facility] and she states she will review and call me back. 1350:  [facility] accepted pt. Contacted Dr. Walt Giraldo. Informed nursing, pt, pt's sons Dallas Murguia and Jaz Draper at pt's bedside. 1555:  Dr. Walt Giraldo states we can go ahead and set up transport for pt. Discharge summary uploaded to [facility]. Informed PJ of [facility]. 1650:  Transport set with [address] within an hour. Informed nursing, pt and pt's son Dallas Murguia and Iman Maxwell of [facility].

## 2018-10-30 ENCOUNTER — PATIENT OUTREACH (OUTPATIENT)
Dept: INTERNAL MEDICINE CLINIC | Age: 83
End: 2018-10-30

## 2018-10-30 NOTE — PROGRESS NOTES
Transition of Care Coordination/Hospital to Post Acute Facility: 
  
Date/Time:  10/30/2018 9:14 AM 
 
[facility] was [facility] and discharged on 10/29/18 for pulmonary fibrosis, hyponatremia, debility. Patient was transferred to [facility] for continuation of care. Inpatient RRAT score: 13 Top Challenges reviewed To [facility] Method of communication with care team :email Nurse Navigator(NN) spoke with nurse Shruthi Marsh to provide introduction to self and explanation of the Nurse Navigator Role. Verified name and  as patient identifiers. Discussed and reviewed  anticipated length of stay, diet restrictions, discharge summary, follow up appointments, medication reconciliation, recommendations for future lab/imaging, wound care orders ACP:  
Does the patient have a current ACP (including DDNR):  yes Does the post acute facility have a copy of the patients ACP:  yes Medication(s):  
New Medications at Discharge: prednisione Changed Medications at Discharge: none Discontinued Medications at Discharge: Tenex, Zocor, Osteo Bi-flex, tamoxifen PCP/Specialist follow up:  
Future Appointments Date Time Provider Ashly Love 2018 11:20 AM Herman Hurt  [facility] Opportunity to ask questions was provided. Contact information was provided for future reference or further questions. Will continue to monitor. Goals  Supportive resources in place to maintain patient in the community (ie. Home Health, DME equipment, refer to, medication assistant plan, etc.) Transferred to [facility] Plan:  Monitor for discharge

## 2018-10-31 ENCOUNTER — PATIENT OUTREACH (OUTPATIENT)
Dept: CASE MANAGEMENT | Age: 83
End: 2018-10-31

## 2018-10-31 NOTE — PROGRESS NOTES
Community Care Team Documentation for Patient in [facility]     Patient discharged from [facility] 10/25/2018 - 10/29/2018 to [facility], [place], on 10/29/2018. Hospital Discharge diagnosis:    1. Pulmonary fibrosis with acute flare, improved now,   2. hyponatremia secondary to diuretics, improved now. 2.  Acute debility due to multiple comorbidities improved now. 3.  Chronic kidney disease stage III, creatinine at baseline. 4.  Hypertension. 5.  Abnormal LFTs most likely related to medications, statin and tamoxifen, which have been held. Hepatitis panel negative. 6.  Dyslipidemia. 7.  Polymyalgia rheumatica. 8.  History of breast cancer in remission. 9.  History of deep vein thrombosis in the past on Eliquis. 10. Pneumobilia, pt asymptomatic      SNF Attending Provider:  Lu August    Anticipated discharge date from SNF:  TBD      PCP : Hayley Farah MD    Nurse Navigator: Emmie Brody RN    [facility] Team rounds completed, updates provided by facility. PT/OT: pippaals pending. Dispo: return home. DNR    Per chart review: from Care Management note on 10/28: \"Pt lives alone. Pt states her children live in [place] but hard for them to leave their own family and care for her. She states it is hard for her to take her medicines, do groceries and cook and care for herself. Pt's son Christin Santos and his wife from [place] at pt's bedside and states they are planning for pt to go long term in the future and her other brother is working on it. \"          Low Risk            8       Total Score        3 Has Seen PCP in Last 6 Months (Yes=3, No=0)    5 Charlson Comorbidity Score (Age + Comorbid Conditions)        Criteria that do not apply:    . Living with Significant Other. Assisted Living. LTAC. SNF. or   Rehab    Patient Length of Stay (>5 days = 3)    IP Visits Last 12 Months (1-3=4, 4=9, >4=11)    Pt.  Coverage (Medicare=5 , Medicaid, or Self-Pay=4) Active Ambulatory Problems     Diagnosis Date Noted    Essential hypertension 10/31/2015    Hyperlipidemia 10/31/2015    PMR [facility]ymyalgia rheumatica) [facility] 06/28/2017    [facility] 11/03/2017    Bifascicular bundle branch block 05/07/2018    History of breast cancer 10/25/2018    Fall at home 10/26/2018     Resolved Ambulatory Problems     Diagnosis Date Noted    Pure hyperglyceridemia      Past Medical History:   Diagnosis Date    [facility]     Breast cancer ([facility] 2001, 1/ 2014    Cancer ([facility]     Colon polyps     Elevated cholesterol     Enlarged lymph nodes     GERD (gastroesophageal reflux disease)     Hypertension     Ill-defined condition 2007 to 2010    Insomnia     Lichen plano-pilaris     Mass of breast, right 1976    Mass of breast, right 1994    Pure hyperglyceridemia     Thromboembolus ([facility] 09/2017    Thyroid disease

## 2018-11-07 ENCOUNTER — PATIENT OUTREACH (OUTPATIENT)
Dept: CASE MANAGEMENT | Age: 83
End: 2018-11-07

## 2018-11-14 ENCOUNTER — PATIENT OUTREACH (OUTPATIENT)
Dept: CASE MANAGEMENT | Age: 83
End: 2018-11-14

## 2018-11-21 ENCOUNTER — PATIENT OUTREACH (OUTPATIENT)
Dept: INTERNAL MEDICINE CLINIC | Age: 83
End: 2018-11-21

## 2018-11-21 ENCOUNTER — PATIENT OUTREACH (OUTPATIENT)
Dept: CASE MANAGEMENT | Age: 83
End: 2018-11-21

## 2018-11-21 NOTE — PROGRESS NOTES
Transition of Care Coordination/Hospital to Post Acute Facility: 
  
Patient was admitted to [facility] on 10[facility] and discharged on 10/29/18 for pulmonary fibrosis, hyponatremia, debility. Patient was transferred to [facility] for continuation of care. Call to facility. Reached nurse [facility] who stated the patient is scheduled to transfer to [place] [address] on 11/28/18. Goals Addressed This Visit's Progress  Supportive resources in place to maintain patient in the community (ie. Home Health, DME equipment, refer to, medication assistant plan, etc.)   On track Transferred to [facility] Plan:  Monitor for discharge 11/21/18-patient scheduled to transfer to [place] [address] on 11/28/18

## 2018-11-28 ENCOUNTER — PATIENT OUTREACH (OUTPATIENT)
Dept: CASE MANAGEMENT | Age: 83
End: 2018-11-28

## 2018-11-29 ENCOUNTER — PATIENT OUTREACH (OUTPATIENT)
Dept: INTERNAL MEDICINE CLINIC | Age: 83
End: 2018-11-29

## 2018-11-29 NOTE — PROGRESS NOTES
Hospital Discharge Follow-Up Date/Time:  2018 10:53 AM 
 
Patient was [facility] on 10/25/18 and discharged on 10/29/18 for pulmonary fibrosis, hyponatremia, debility. The physician discharge summary was available at the time of outreach. Ms Emily Griffin was transferred to [facility] 10/29-18 for continuation of care and discharged to [place] [address]. The facility was contacted within one business days of discharge. Call to facility. Spoke with nurse Tien Rae who stated patient is adjusting well to the facility and there are no problems or concerns noted at this time. Nurse confirmed Dr. Summer Teran is still listed as PCP. Patient's daughter was visiting at the time of the call and nurse stated she will remind daughter to call and scheduled appointment with PCP. Received call from patient who stated she will call the office tomorrow when son is visiting to schedule a PCP appointment. Top Challenges reviewed with the provider To [facility] Method of communication with provider :email Inpatient RRAT score: 13 Was this a readmission? no  
Patient stated reason for the readmission: NA 
 
Nurse Navigator (NN) contacted the caregiver by telephone to perform post hospital discharge assessment. Verified name and  with caregiver as identifiers. Provided introduction to self, and explanation of the Nurse Navigator role. Reviewed discharge instructions and red flags with caregiver who verbalized understanding. Caregiver given an opportunity to ask questions and does not have any further questions or concerns at this time. The caregiver agrees to contact the PCP office for questions related to their healthcare. NN provided contact information for future reference. Disease Specific:   N/A Summary of patient's top problems: 1. Pulmonary fibrosis 2. Debility 3. Living alone prior to hospitalization Home Health orders at discharge: PT, OT Home Health company: [facility] Date of initial visit: TBD Durable Medical Equipment ordered/company: none Durable Medical Equipment received: none Barriers to care? none noted at this time as patient resides in an [facility] Advance Care Planning:  
Does patient have an Advance Directive:  reviewed and current Medication reconciliation was performed with caregiver, who verbalizes understanding of administration of home medications. There were no barriers to obtaining medications identified at this time. Referral to Pharm D needed: no  
 
Current Outpatient Medications Medication Sig  
 metoprolol tartrate (LOPRESSOR) 50 mg tablet TAKE 1 TABLET BY MOUTH TWO  TIMES DAILY  losartan (COZAAR) 100 mg tablet TAKE 1 TABLET BY MOUTH  DAILY  ELIQUIS 2.5 mg tablet TAKE 1 TABLET BY MOUTH TWO  TIMES DAILY  dicyclomine (BENTYL) 10 mg capsule 1 three times per day (with each meal)  furosemide (LASIX) 20 mg tablet 1 tablet each AM  Indications: patient states she is taking 1 and 1/2 tablet  triamcinolone acetonide (KENALOG) 0.1 % topical cream Apply daily  CALCIUM CITRATE/VITAMIN D3 (CITRACAL + D MAXIMUM PO) Take 2 Tabs by mouth daily.  folic acid 990 mcg tablet Take 800 mcg by mouth daily.  doxepin (SINEQUAN) 25 mg capsule Take 1 Cap by mouth nightly.  levothyroxine (SYNTHROID) 100 mcg tablet Take 1 Tab by mouth nightly.  cyanocobalamin 1,000 mcg tablet Take 1,000 mcg by mouth daily.  multivitamins-minerals-lutein (CENTRUM SILVER) tab tablet Take 1 Tab by mouth daily.  loratadine (CLARITIN) 10 mg tablet Take 10 mg by mouth daily.  clobetasol (OLUX) 0.05 % topical foam Apply  to affected area two (2) times daily as needed for Skin Irritation. use thin film on affected area  predniSONE (DELTASONE) 10 mg tablet Prednisone 10 mg tabs: p.o. 
3 tabs daily for 3 days then drop to  
2 tabs daily for 3 days then drop to 1 tabs daily for 3 days then stop. Dispense 18 tabs  raNITIdine (ZANTAC) 300 mg tablet Take 1 Tab by mouth daily.  peg 400-propylene glycol (SYSTANE, PROPYLENE GLYCOL,) 0.4-0.3 % drop Administer 1 Drop to both eyes as needed. No current facility-administered medications for this visit. There are no discontinued medications. [facility] follow up appointment(s): No future appointments. Non-[facility] follow up appointment(s): none [facility]:  n/a  
 
 
Goals  Attends follow-up appointments as directed. Plan:  Monitor for attendance at apts and assist as needed to schedule  Supportive resources in place to maintain patient in the community (ie. Home Health, DME equipment, refer to, medication assistant plan, etc.) Transferred to [facility] Plan:  Monitor for discharge 11/21/18-patient scheduled to transfer to [place] [address] on 11/28/18 11/29/18-patient has transitioned to [place] [address]

## 2018-11-30 ENCOUNTER — PATIENT OUTREACH (OUTPATIENT)
Dept: INTERNAL MEDICINE CLINIC | Age: 83
End: 2018-11-30

## 2018-11-30 ENCOUNTER — TELEPHONE (OUTPATIENT)
Dept: INTERNAL MEDICINE CLINIC | Age: 83
End: 2018-11-30

## 2018-11-30 NOTE — TELEPHONE ENCOUNTER
Home health needs a verbal order to get speech therapy  For please call him at [phone]  please ask for [facility]

## 2018-11-30 NOTE — PROGRESS NOTES
Hospital Discharge Follow-Up 
  
  
Patient was admitted to [facility] on 10/[facility]/18 and discharged on 10/29/18 for pulmonary fibrosis, hyponatremia, debility. The physician discharge summary was available at the time of outreach. Ms Nicky Galvez was transferred to [facility] 10/29-11/28/18 for continuation of care and discharged to [place] [address]. Incoming call from patient's son, Nick Dalton, in response to message left on 11/29/18. Son stated he prefers not to schedule an appointment for the patient to see Dr. Kathryn Knapp at this time citing concerns for patient being exposed to sick people in the waiting room of the office. Son stated family is hoping to move patient to [facility] within the next month when a bed is available. It is son's understanding that Dr. Kathryn Knapp sees patient's at [facility]. Son understands to contact the office for patient's health concerns or assistance. Goals  Attends follow-up appointments as directed. Plan:  Monitor for attendance at apts and assist as needed to schedule 11/30/18-Patient's son declines apt with PCP at this time  Supportive resources in place to maintain patient in the community (ie. Home Health, DME equipment, refer to, medication assistant plan, etc.) Transferred to [facility] Plan:  Monitor for discharge 11/21/18-patient scheduled to transfer to [place] [address] on 11/28/18 11/29/18-patient has transitioned to [place] [address]

## 2018-12-03 NOTE — TELEPHONE ENCOUNTER
Mike was called and advised that per DR Dexter Ross he has given verbal for speech therapy   Mike verbalized understanding

## 2018-12-07 NOTE — PROGRESS NOTES
Community Care Team Documentation for Patient in [facility]     Patient discharged from [facility] 10/25/2018 - 10/29/2018 to [facility], [place], on 10/29/2018. Hospital Discharge diagnosis:    1. Pulmonary fibrosis with acute flare, improved now,   2. hyponatremia secondary to diuretics, improved now. 2.  Acute debility due to multiple comorbidities improved now. 3.  Chronic kidney disease stage III, creatinine at baseline. 4.  Hypertension. 5.  Abnormal LFTs most likely related to medications, statin and tamoxifen, which have been held. Hepatitis panel negative. 6.  Dyslipidemia. 7.  Polymyalgia rheumatica. 8.  History of breast cancer in remission. 9.  History of deep vein thrombosis in the past on Eliquis. 10. Pneumobilia, pt asymptomatic      SNF Attending Provider:  Hanh Drummond    Anticipated discharge date from SNF:  11/28/2018      PCP : Chino Olivera MD    Nurse Navigator: Huan Helm RN    [facility] rounds completed, updates provided by facility. dc 11/28 to [facility]. [facility]  Therapy extended to allow patient to become SURENDRA level of function   DNR      Low Risk            8       Total Score        3 Has Seen PCP in Last 6 Months (Yes=3, No=0)    5 Charlson Comorbidity Score (Age + Comorbid Conditions)        Criteria that do not apply:    . Living with Significant Other. Assisted Living. LTAC. SNF. or   Rehab    Patient Length of Stay (>5 days = 3)    IP Visits Last 12 Months (1-3=4, 4=9, >4=11)    Pt.  Coverage (Medicare=5 , Medicaid, or Self-Pay=4)      Active Ambulatory Problems     Diagnosis Date Noted    Essential hypertension 10/31/2015    Hyperlipidemia 10/31/2015    PMR (polymyalgia rheumatica) ([facility] 06/28/2017    Pulmonary fibrosis ([facility] 11/03/2017    Bifascicular bundle branch block 05/07/2018    History of breast cancer 10/25/2018    Fall at home 10/26/2018     Resolved Ambulatory Problems     Diagnosis Date Noted  Pure hyperglyceridemia      Past Medical History:   Diagnosis Date    Autoimmune disease ([facility]     Breast cancer ([facility] 2001, 1/ 2014    Cancer ([facility]     Colon polyps     Elevated cholesterol     Enlarged lymph nodes     GERD (gastroesophageal reflux disease)     Hypertension     Ill-defined condition 2007 to 2010    Insomnia     Lichen plano-pilaris     Mass of breast, right 1976    Mass of breast, right 1994    Pure hyperglyceridemia     Thromboembolus ([facility] 09/2017    Thyroid disease

## 2018-12-07 NOTE — PROGRESS NOTES
Community Care Team Documentation for Patient in [facility]     Patient discharged from [facility] 10/25/2018 - 10/29/2018 to [facility], [place], on 10/29/2018. Hospital Discharge diagnosis:    1. Pulmonary fibrosis with acute flare, improved now,   2. hyponatremia secondary to diuretics, improved now. 2.  Acute debility due to multiple comorbidities improved now. 3.  Chronic kidney disease stage III, creatinine at baseline. 4.  Hypertension. 5.  Abnormal LFTs most likely related to medications, statin and tamoxifen, which have been held. Hepatitis panel negative. 6.  Dyslipidemia. 7.  Polymyalgia rheumatica. 8.  History of breast cancer in remission. 9.  History of deep vein thrombosis in the past on Eliquis. 10. Pneumobilia, pt asymptomatic      SNF Attending Provider:  Shawn Villafana    Anticipated discharge date from SNF:  11/20/2018      PCP : Ricky Roldan MD    Nurse Navigator: Delaney Hankins RN    [facility] rounds completed, updates provided by facility. detention 11/20  LE swelling: poss cellulitis? NP coming in. DNR    Per chart review: from Care Management note on 10/28: \"Pt lives alone. Pt states her children live in [place] but hard for them to leave their own family and care for her. She states it is hard for her to take her medicines, do groceries and cook and care for herself. Pt's son Rajan Bishop and his wife from [place] at pt's bedside and states they are planning for pt to go long term in the future and her other brother is working on it. \"          Low Risk            8       Total Score        3 Has Seen PCP in Last 6 Months (Yes=3, No=0)    5 Charlson Comorbidity Score (Age + Comorbid Conditions)        Criteria that do not apply:    . Living with Significant Other. Assisted Living. LTAC. SNF. or   Rehab    Patient Length of Stay (>5 days = 3)    IP Visits Last 12 Months (1-3=4, 4=9, >4=11)    Pt.  Coverage (Medicare=5 , Medicaid, or Self-Pay=4)      Active Ambulatory Problems     Diagnosis Date Noted    Essential hypertension 10/31/2015    Hyperlipidemia 10/31/2015    PMR [facility]ymyalgia rheumatica) [facility] 06/28/2017    Pulmonary fibrosis ([facility] 11/03/2017    Bifascicular bundle branch block 05/07/2018    History of breast cancer 10/25/2018    Fall at home 10/26/2018     Resolved Ambulatory Problems     Diagnosis Date Noted    Pure hyperglyceridemia      Past Medical History:   Diagnosis Date    Autoimmune disease ([facility]     Breast cancer ([facility] 2001, 1/ 2014    Cancer ([facility]     Colon polyps     Elevated cholesterol     Enlarged lymph nodes     GERD (gastroesophageal reflux disease)     Hypertension     Ill-defined condition 2007 to 2010    Insomnia     Lichen plano-pilaris     Mass of breast, right 1976    Mass of breast, right 1994    Pure hyperglyceridemia     Thromboembolus ([facility] 09/2017    Thyroid disease

## 2018-12-07 NOTE — PROGRESS NOTES
Community Care Team Documentation for Patient in [facility]     Patient discharged from [facility] 10/25/2018 - 10/29/2018 to [facility] [place], on 10/29/2018. Hospital Discharge diagnosis:    1. Pulmonary fibrosis with acute flare, improved now,   2. hyponatremia secondary to diuretics, improved now. 2.  Acute debility due to multiple comorbidities improved now. 3.  Chronic kidney disease stage III, creatinine at baseline. 4.  Hypertension. 5.  Abnormal LFTs most likely related to medications, statin and tamoxifen, which have been held. Hepatitis panel negative. 6.  Dyslipidemia. 7.  Polymyalgia rheumatica. 8.  History of breast cancer in remission. 9.  History of deep vein thrombosis in the past on Eliquis. 10. Pneumobilia, pt asymptomatic      SNF Attending Provider:  Hemal Zavala    Anticipated discharge date from SNF:  TBD      PCP : Mandeep Flores MD    Nurse Navigator: Wang Ruth RN    [facility] Team rounds completed, updates provided by facility. [facility] vs LTC. PT goals is to get to detention level. DNR    Per chart review: from Care Management note on 10/28: \"Pt lives alone. Pt states her children live in [place] but hard for them to leave their own family and care for her. She states it is hard for her to take her medicines, do groceries and cook and care for herself. Pt's son Kasey Echols and his wife from [place] at pt's bedside and states they are planning for pt to go long term in the future and her other brother is working on it. \"          Low Risk            8       Total Score        3 Has Seen PCP in Last 6 Months (Yes=3, No=0)    5 Charlson Comorbidity Score (Age + Comorbid Conditions)        Criteria that do not apply:    . Living with Significant Other. Assisted Living. LTAC. SNF. or   Rehab    Patient Length of Stay (>5 days = 3)    IP Visits Last 12 Months (1-3=4, 4=9, >4=11)    Pt.  Coverage (Medicare=5 , Medicaid, or Self-Pay=4) Active Ambulatory Problems     Diagnosis Date Noted    Essential hypertension 10/31/2015    Hyperlipidemia 10/31/2015    PMR (polymyalgia rheumatica) [facility] 06/28/2017    Pulmonary fibrosis [facility] 11/03/2017    Bifascicular bundle branch block 05/07/2018    History of breast cancer 10/25/2018    Fall at home 10/26/2018     Resolved Ambulatory Problems     Diagnosis Date Noted    Pure hyperglyceridemia      Past Medical History:   Diagnosis Date    Autoimmune disease [facility]     Breast cancer ([facility] 2001, 1/ 2014    Cancer ([facility]     Colon polyps     Elevated cholesterol     Enlarged lymph nodes     GERD (gastroesophageal reflux disease)     Hypertension     Ill-defined condition 2007 to 2010    Insomnia     Lichen plano-pilaris     Mass of breast, right 1976    Mass of breast, right 1994    Pure hyperglyceridemia     Thromboembolus ([facility] 09/2017    Thyroid disease

## 2018-12-14 ENCOUNTER — PATIENT OUTREACH (OUTPATIENT)
Dept: INTERNAL MEDICINE CLINIC | Age: 83
End: 2018-12-14

## 2018-12-14 NOTE — PROGRESS NOTES
Hospital Discharge Follow-Up        Patient was admitted [facility] on 10/25/18 and discharged on 10/29/18 for pulmonary fibrosis, hyponatremia, debility. The physician discharge summary was available at the time of outreach. Tone Coyle was transferred to [facility] 10/29-11/28/18 for continuation of care and discharged to [place] [address]. Call to facility. Spoke with nurse Salome Vazquez who stated the patient is doing very well with no problems or concerns noted. Patient will be in the facility at least until the end of December. Goals Addressed                 This Visit's Progress     Supportive resources in place to maintain patient in the community (ie.  Home Health, DME equipment, refer to, medication assistant plan, etc.)   On track     Transferred to [facility] [address]:  Monitor for discharge    11/21/18-patient scheduled to transfer to [place] [address] on 11/28/18 11/29/18-patient has transitioned to [place] [address]  12/14/18-remains in [place] [address]

## 2018-12-18 ENCOUNTER — TELEPHONE (OUTPATIENT)
Dept: VASCULAR SURGERY | Age: 83
End: 2018-12-18

## 2018-12-18 NOTE — TELEPHONE ENCOUNTER
Son came by office and patient has been recently admitted to [facility] assisted living , and is requesting that she be taken off the eliquist , he had copy of recent DVT study that was negative . Reviewed with Dr. Yu Ponce and since patient was taken off of the tamoxifen then ok for her to stop the eliquist but take 325 mg asa. Called the son and advised of this. Talked with radha at the home and order faxed to stop the eliquist and start 325 mg Asa.

## 2019-01-03 ENCOUNTER — PATIENT OUTREACH (OUTPATIENT)
Dept: INTERNAL MEDICINE CLINIC | Age: 84
End: 2019-01-03

## 2019-01-03 RX ORDER — ALBUTEROL SULFATE 0.83 MG/ML
SOLUTION RESPIRATORY (INHALATION)
COMMUNITY

## 2019-01-03 RX ORDER — ACETAMINOPHEN 325 MG/1
TABLET ORAL
COMMUNITY

## 2019-01-03 RX ORDER — GUAIFENESIN 100 MG/5ML
81 LIQUID (ML) ORAL DAILY
COMMUNITY

## 2019-01-03 RX ORDER — GUANFACINE HYDROCHLORIDE 1 MG/1
TABLET ORAL DAILY
COMMUNITY

## 2019-01-03 RX ORDER — CARVEDILOL 6.25 MG/1
TABLET ORAL 2 TIMES DAILY WITH MEALS
COMMUNITY

## 2019-01-03 NOTE — PROGRESS NOTES
Transition of Care Coordination/Hospital to Post Acute Facility: 
  
Date/Time:  1/3/2019 9:26 AM 
 
Patient was admitted to [facility] on 18 and discharged on 18 for resp. Failure, CAP, pulmonary fibrosis, anxiety, elevated  BP. Patient was transferred to [facility] for continuation of care. Inpatient RRAT score: not available-high risk for readmission Top Challenges reviewed To [facility] Method of communication with care team :email Nurse Navigator(NN) spoke with nurse Christopher Mcguire to provide introduction to self and explanation of the Nurse Navigator Role. Verified name and  as patient identifiers. Per nurse patient is doing well with no problems or concerns identified at this time. Discussed and reviewed  discharge summary, medication reconciliation ACP:  
Does the patient have a current ACP (including DDNR):  yes and DNR/DNI Does the post acute facility have a copy of the patients ACP:  yes Medication(s):  
New Medications at Discharge: Tylenol, Proventil INH Nebs, Coreg, Spiriva Changed Medications at Discharge: Ousmane Pickett Discontinued Medications at Discharge: Lopressor PCP/Specialist follow up: No future appointments. Opportunity to ask questions was provided. Contact information was provided for future reference or further questions. Will continue to monitor.

## 2019-01-10 ENCOUNTER — PATIENT OUTREACH (OUTPATIENT)
Dept: INTERNAL MEDICINE CLINIC | Age: 84
End: 2019-01-10

## 2019-01-22 ENCOUNTER — PATIENT OUTREACH (OUTPATIENT)
Dept: INTERNAL MEDICINE CLINIC | Age: 84
End: 2019-01-22

## 2019-01-22 NOTE — PROGRESS NOTES
Transition of Care Coordination/Hospital to Post Acute Facility: 
  
Date/Time:  2019 10:54 AM 
 
Patient was admitted to [facility] on 18 and discharged on 18 for resp. Failure, CAP, pulmonary fibrosis, anxiety, elevated  BP. Patient was transferred to [facility] for continuation of care. Patient was transferred to [facility] on 19. Prior to this admission patient was residing in [facility]. Inpatient RRAT score: not available-high risk for readmission Top Challenges reviewed Patient has moved back to [facility] from [facility] Method of communication with care team :email Nurse Navigator(NN) spoke with nurse Aidan Cerda to provide introduction to self and explanation of the Nurse Navigator Role. Verified name and  as patient identifiers. Per nurse patient is doing well and is happy to be back in the living environment of the [facility]. Son is visiting today. No problems or concerns noted. Discussed and reviewed  follow up appointments, medication reconciliation ACP:  
Does the patient have a current ACP (including DDNR):  yes Does the post acute facility have a copy of the patients ACP:  yes Medication(s):  
New Medications at Discharge: none Changed Medications at Discharge: none Discontinued Medications at Discharge: none PCP/Specialist follow up: No future appointments. Opportunity to ask questions was provided. Contact information was provided for future reference or further questions. Will continue to monitor.

## 2019-02-05 ENCOUNTER — PATIENT OUTREACH (OUTPATIENT)
Dept: INTERNAL MEDICINE CLINIC | Age: 84
End: 2019-02-05

## 2019-02-06 ENCOUNTER — PATIENT OUTREACH (OUTPATIENT)
Dept: CASE MANAGEMENT | Age: 84
End: 2019-02-06

## 2019-02-06 NOTE — PROGRESS NOTES
Community Care Team Documentation for Patient in [facility]     Patient discharged from [facility] 1/27/19-2/3/19 to [facility], [place], on 2/3/19. Hospital Discharge diagnosis:  respiratory failure, pulmonary fibrosis, KAJAL on CKD 3, anxiety, acute on chronic HFrEF . SNF Attending Provider: Nick Umanzor. Anticipated discharge date from SNF:  2/18/2019      PCP : Irving Harvey MD    Nurse Navigator: Laurent Valdes RN    Community care team rounds completed, updates provided by facility. DNR  PT/OT: progressing. CHF: On O2. Daily weights: stable. Dispo: Return to [facility] 2/18, possibly sooner if continues to progress at current rate. Low Risk            8       Total Score        3 Has Seen PCP in Last 6 Months (Yes=3, No=0)    5 Charlson Comorbidity Score (Age + Comorbid Conditions)        Criteria that do not apply:    . Living with Significant Other. Assisted Living. LTAC. SNF. or   Rehab    Patient Length of Stay (>5 days = 3)    IP Visits Last 12 Months (1-3=4, 4=9, >4=11)    Pt.  Coverage (Medicare=5 , Medicaid, or Self-Pay=4)      Active Ambulatory Problems     Diagnosis Date Noted    Essential hypertension 10/31/2015    Hyperlipidemia 10/31/2015    PMR (polymyalgia rheumatica) ([address] 06/28/2017    Pulmonary fibrosis ([address] 11/03/2017    Bifascicular bundle branch block 05/07/2018    History of breast cancer 10/25/2018    Fall at home 10/26/2018     Resolved Ambulatory Problems     Diagnosis Date Noted    Pure hyperglyceridemia      Past Medical History:   Diagnosis Date    Autoimmune disease ([address]     Breast cancer ([address] 2001, 1/ 2014    Cancer ([address]     Colon polyps     Elevated cholesterol     Enlarged lymph nodes     GERD (gastroesophageal reflux disease)     Hypertension     Ill-defined condition 2007 to 2010    Insomnia     Lichen plano-pilaris     Mass of breast, right 1976    Mass of breast, right 1994    Pure hyperglyceridemia     Thromboembolus ([facility] 09/2017    Thyroid disease

## 2019-02-13 ENCOUNTER — PATIENT OUTREACH (OUTPATIENT)
Dept: CASE MANAGEMENT | Age: 84
End: 2019-02-13

## 2019-02-14 ENCOUNTER — PATIENT OUTREACH (OUTPATIENT)
Dept: INTERNAL MEDICINE CLINIC | Age: 84
End: 2019-02-14

## 2019-02-14 NOTE — PROGRESS NOTES
Transition of Care Coordination/Hospital to Post Acute Facility: 
 
  
Patient was admitted to [facility] on 1/27/19 and discharged on 2/3/19 for respiratory failure, pulmonary fibrosis, KAJAL on CKD 3, anxiety, acute on chronic HFrEF  Patient was transferred to [facility] for continuation of care 2/3-2/13/19. The patient was then transferred back to [facility] on 2/13/19. Call to facility. Spoke with nurse Ariela Gordillo. Per nurse patient medications have not changed except that patient is now on oxygen 24/7. The facility needs an order for the oxygen. Nurse stated son understands patient needs an appointment with Dr. Hector Eaton. Nurse stated she will ask the son to call the office to schedule. Patient will have [facility] in the [facility].

## 2019-03-01 ENCOUNTER — PATIENT OUTREACH (OUTPATIENT)
Dept: INTERNAL MEDICINE CLINIC | Age: 84
End: 2019-03-01

## 2019-03-01 NOTE — PROGRESS NOTES
Hospital Discharge Follow-Up 
  
 
 
Patient was admitted to [address] 1/27/19 and discharged on 2/3/19 for respiratory failure, pulmonary fibrosis, KAJAL on CKD 3, anxiety, acute on chronic HFrEF  Patient was transferred to [facility] for continuation of care 2/3-2/13/19. The patient was then transferred back to [facility] on 2/13/19. Call to [facility]. Spoke with nurse Jeannie Schwartz who stated patient is \"doing OK\" with no problems or concerns noted at this time. Goals Addressed This Visit's Progress  Supportive resources in place to maintain patient in the community (ie. Home Health, DME equipment, refer to, medication assistant plan, etc.) Transferred to [place] Plan:  Monitor for discharge 11/21/18-patient scheduled to transfer to [place] [address] on 11/28/18 11/29/18-patient has transitioned to [place] [address] 
12/14/18-remains in [place] [address] 
 
12/31/18, 2/3/19-to [place] after hospitalization 2/14/19-returned to [place] [address] 
3/1/19-remains in [facility]

## 2019-03-15 ENCOUNTER — PATIENT OUTREACH (OUTPATIENT)
Dept: INTERNAL MEDICINE CLINIC | Age: 84
End: 2019-03-15

## 2019-03-15 NOTE — PROGRESS NOTES
Hospital Discharge Follow-Up           Patient was admitted to [address] 1/27/19 and discharged on 2/3/19 for respiratory failure, pulmonary fibrosis, KAJAL on CKD 3, anxiety, acute on chronic HFrEF  Patient was transferred to [facility] for continuation of care 2/3-2/13/19.  The patient was then transferred back to [facility] on 2/13/19. Call to facility. Spoke to Rhoda, discharge planner, who stated the patient will remain in facility as a permanent resident. Dr. Alisha Banks remains patient's PCP. Discussed that patient has not attended an appointment with Dr. Alisha Banks since 10/25/18 and family should schedule. Patient has graduated from the Complex Case Management  program on 3/15/19. Patient's symptoms are stable at this time. Facility has the ability to manage. Care management goals have been completed at this time. No further nurse navigator follow up scheduled. Pt has nurse navigator's contact information for any further questions, concerns, or needs. Patients upcoming visits:  No future appointments. Goals Addressed                 This Visit's Progress     COMPLETED: Attends follow-up appointments as directed. Plan:  Monitor for attendance at apts and assist as needed to schedule    11/30/18-Patient's son declines apt with PCP at this time       Supportive resources in place to maintain patient in the community (ie.  Home Health, DME equipment, refer to, medication assistant plan, etc.)   On track     Transferred to [address] [place] [address] N:  Monitor for discharge    11/21/18-patient scheduled to transfer to [place] [id number] on 11/28/18 11/29/18-patient has transitioned to [place] [address]  12/14/18-remains in [place] [address]    12/31/18, 2/3/19-to [facility] after hospitalization  2/14/19-returned to [place] [address]  3/1/19-remains in [address]  3/15/19-will remain in [facility] as a permanent resident

## 2022-03-08 NOTE — PROGRESS NOTES
Community Care Team Documentation for Patient in [facility] Patient discharged from [facility] 1/27/19-2/3/19 to [facility], [place], on 2/3/19. Hospital Discharge diagnosis:  respiratory failure, pulmonary fibrosis, KAJAL on CKD 3, anxiety, acute on chronic HFrEF . SNF Attending Provider: Kayla Bella. Anticipated discharge date from SNF:  2/18/2019 PCP : Evelyn Edmondson MD 
 
Nurse Navigator: Deann Bailey RN Community care team rounds completed, updates provided by facility. DNR Dc 2/13 with [facility] to senior living. Low Risk 8 Total Score 3 Has Seen PCP in Last 6 Months (Yes=3, No=0) 5 Charlson Comorbidity Score (Age + Comorbid Conditions) Criteria that do not apply:  
 . Living with Significant Other. Assisted Living. LTAC. SNF. or  
Rehab Patient Length of Stay (>5 days = 3) IP Visits Last 12 Months (1-3=4, 4=9, >4=11) Pt. Coverage (Medicare=5 , Medicaid, or Self-Pay=4) Active Ambulatory Problems Diagnosis Date Noted  Essential hypertension 10/31/2015  Hyperlipidemia 10/31/2015  PMR (polymyalgia rheumatica) (H[facility]) 06/28/2017  Pulmonary fibrosis ([address] 11/03/2017  Bifascicular bundle branch block 05/07/2018  History of breast cancer 10/25/2018  Fall at home 10/26/2018 Resolved Ambulatory Problems Diagnosis Date Noted  Pure hyperglyceridemia Past Medical History:  
Diagnosis Date  Autoimmune disease ([address]  Breast cancer ([address] 2001, 1/ 2014  Cancer ([address]  Colon polyps  Elevated cholesterol  Enlarged lymph nodes  GERD (gastroesophageal reflux disease)  Hypertension  Ill-defined condition 2007 to 2010  Insomnia  Lichen plano-pilaris  Mass of breast, right 1976  Mass of breast, right 1994  Pure hyperglyceridemia  Thromboembolus ([address] 09/2017  Thyroid disease
no

## 2023-01-01 NOTE — TELEPHONE ENCOUNTER
Also informed patient of labs with orders per Dr Hector Eaton to continue prednisone with understanding voiced. Goal Outcome Evaluation:           Progress: no change  Outcome Evaluation: VSS on RA, no events thus far. PO feeding fair w/ ultra preemie, taking 37, 23, and 35ml; no emesis. gained 66 grams. voiding/stooling.
